# Patient Record
Sex: FEMALE | Race: WHITE | NOT HISPANIC OR LATINO | Employment: OTHER | RURAL
[De-identification: names, ages, dates, MRNs, and addresses within clinical notes are randomized per-mention and may not be internally consistent; named-entity substitution may affect disease eponyms.]

---

## 2017-05-12 ENCOUNTER — OFFICE VISIT (OUTPATIENT)
Dept: FAMILY MEDICINE CLINIC | Facility: CLINIC | Age: 62
End: 2017-05-12

## 2017-05-12 VITALS
OXYGEN SATURATION: 98 % | HEIGHT: 68 IN | RESPIRATION RATE: 16 BRPM | HEART RATE: 64 BPM | SYSTOLIC BLOOD PRESSURE: 122 MMHG | DIASTOLIC BLOOD PRESSURE: 76 MMHG | BODY MASS INDEX: 27.13 KG/M2 | WEIGHT: 179 LBS

## 2017-05-12 DIAGNOSIS — I10 ESSENTIAL HYPERTENSION: Primary | ICD-10-CM

## 2017-05-12 DIAGNOSIS — G89.29 CHRONIC MIDLINE LOW BACK PAIN WITHOUT SCIATICA: ICD-10-CM

## 2017-05-12 DIAGNOSIS — M54.50 CHRONIC MIDLINE LOW BACK PAIN WITHOUT SCIATICA: ICD-10-CM

## 2017-05-12 PROCEDURE — 99213 OFFICE O/P EST LOW 20 MIN: CPT | Performed by: FAMILY MEDICINE

## 2017-05-12 RX ORDER — ASPIRIN 81 MG/1
TABLET ORAL
COMMUNITY
End: 2019-02-21

## 2017-05-12 RX ORDER — MELOXICAM 15 MG/1
15 TABLET ORAL DAILY
Qty: 30 TABLET | Refills: 1 | Status: SHIPPED | OUTPATIENT
Start: 2017-05-12 | End: 2017-07-06

## 2017-05-12 RX ORDER — LISINOPRIL AND HYDROCHLOROTHIAZIDE 12.5; 1 MG/1; MG/1
1 TABLET ORAL DAILY
Qty: 90 TABLET | Refills: 4 | Status: SHIPPED | OUTPATIENT
Start: 2017-05-12 | End: 2017-12-04 | Stop reason: SDUPTHER

## 2017-05-15 ENCOUNTER — HOSPITAL ENCOUNTER (OUTPATIENT)
Dept: GENERAL RADIOLOGY | Facility: HOSPITAL | Age: 62
Discharge: HOME | End: 2017-05-15
Attending: FAMILY MEDICINE | Admitting: FAMILY MEDICINE

## 2017-05-15 PROCEDURE — 73523 X-RAY EXAM HIPS BI 5/> VIEWS: CPT

## 2017-05-15 PROCEDURE — 72110 X-RAY EXAM L-2 SPINE 4/>VWS: CPT

## 2017-05-17 ENCOUNTER — HOSPITAL ENCOUNTER (OUTPATIENT)
Dept: WOMENS IMAGING | Age: 62
Discharge: HOME | End: 2017-05-17
Payer: COMMERCIAL

## 2017-05-17 DIAGNOSIS — Z12.31 VISIT FOR SCREENING MAMMOGRAM: ICD-10-CM

## 2017-05-17 PROCEDURE — 77063 BREAST TOMOSYNTHESIS BI: CPT

## 2017-05-24 ENCOUNTER — OFFICE VISIT (OUTPATIENT)
Dept: SURGERY | Age: 62
End: 2017-05-24
Payer: COMMERCIAL

## 2017-05-24 VITALS
BODY MASS INDEX: 27.07 KG/M2 | WEIGHT: 178.6 LBS | DIASTOLIC BLOOD PRESSURE: 72 MMHG | HEART RATE: 74 BPM | SYSTOLIC BLOOD PRESSURE: 118 MMHG | HEIGHT: 68 IN | RESPIRATION RATE: 16 BRPM

## 2017-05-24 DIAGNOSIS — N60.19 FIBROCYSTIC DISEASE OF BREAST, UNSPECIFIED LATERALITY: ICD-10-CM

## 2017-05-24 DIAGNOSIS — R92.2 DENSE BREASTS: ICD-10-CM

## 2017-05-24 DIAGNOSIS — Z12.31 VISIT FOR SCREENING MAMMOGRAM: Primary | ICD-10-CM

## 2017-05-24 PROCEDURE — 99213 OFFICE O/P EST LOW 20 MIN: CPT | Performed by: SURGERY

## 2017-05-24 RX ORDER — MELOXICAM 15 MG/1
15 TABLET ORAL
COMMUNITY
Start: 2017-05-12 | End: 2018-05-18 | Stop reason: ALTCHOICE

## 2017-05-25 PROBLEM — R92.30 DENSE BREASTS: Status: ACTIVE | Noted: 2017-05-25

## 2017-05-25 PROBLEM — R92.2 DENSE BREASTS: Status: ACTIVE | Noted: 2017-05-25

## 2017-06-01 ENCOUNTER — HOSPITAL ENCOUNTER (OUTPATIENT)
Dept: PHYSICAL THERAPY | Facility: HOSPITAL | Age: 62
Setting detail: THERAPIES SERIES
Discharge: HOME | End: 2017-06-01
Attending: FAMILY MEDICINE

## 2017-06-01 DIAGNOSIS — M54.50 CHRONIC MIDLINE LOW BACK PAIN WITHOUT SCIATICA: Primary | ICD-10-CM

## 2017-06-01 DIAGNOSIS — G89.29 CHRONIC MIDLINE LOW BACK PAIN WITHOUT SCIATICA: Primary | ICD-10-CM

## 2017-06-01 PROCEDURE — 97162 PT EVAL MOD COMPLEX 30 MIN: CPT | Performed by: PHYSICAL THERAPIST

## 2017-06-01 NOTE — THERAPY EVALUATION
"    Outpatient Physical Therapy Ortho Initial Evaluation  [facility]     Patient Name: Karen Tipton  : 1955  MRN: [id number]  Today's Date: 2017      Visit Date: 2017    Patient Active Problem List   Diagnosis   • Essential hypertension   • Low back pain without sciatica        Past Medical History:   Diagnosis Date   • Hypertension         History reviewed. No pertinent surgical history.    Visit Dx:     ICD-10-CM ICD-9-CM   1. Chronic midline low back pain without sciatica M54.5 724.2    G89.29 338.29             Patient History       17 0745          History    Chief Complaint Pain  -TB      Type of Pain Back pain  -TB      Date Current Problem(s) Began 17  -TB      Brief Description of Current Complaint She started having back pain in January. She would feel a sharp pain in her back that would be sharp and stop her movement. She has a h/o scoliosis. She denies any radicular symptoms or B/B changes.  -TB      Patient/Caregiver Goals Relieve pain;Return to prior level of function  -TB      Patient's Rating of General Health Very good  -TB      Hand Dominance right-handed  -TB      Occupation/sports/leisure activities Surgery Scheduler at   -      Patient seeing anyone else for problem(s)? No  -TB      How has patient tried to help current problem? yoga, walking  -TB      What clinical tests have you had for this problem? X-ray  -TB      Results of Clinical Tests dextroscoliosis in mid lumbar; mild deg changes at T12/L1, L5/S1  -TB      Pain     Pain Location Back  -TB      Pain at Present 0  -TB      Pain at Best 0  -TB      Pain at Worst 4  -TB      Pain Frequency Intermittent   several times a day  -TB      What Performance Factors Make the Current Problem(s) WORSE? getting out of a car, stair climbing,   -TB      What Performance Factors Make the Current Problem(s) BETTER? slowly moving   -TB      Pain Comments she says her left leg feel \"shorter\" when it goes out.  -TB      " "Fall Risk Assessment    Any falls in the past year: No  -TB      Does patient have a fear of falling No  -TB      Services    Prior Rehab/Home Health Experiences No  -TB      Do you plan to receive Home Health services in the near future No  -TB      Daily Activities    Primary Language English  -TB      Are you able to read Yes  -TB      Are you able to write Yes  -TB      How does patient learn best? Listening;Reading;Demonstration  -TB      Teaching needs identified Home Exercise Program;Management of Condition  -TB      Patient is concerned about/has problems with Performing home management (household chores, shopping, care of dependents);Performing job responsibilities/community activities (work, school,  -TB      Does patient have problems with the following? None  -TB      Pt Participated in POC and Goals Yes  -TB      Safety    Are you being hurt, hit, or frightened by anyone at home or in your life? No  -TB      Are you being neglected by a caregiver No  -TB        User Key  (r) = Recorded By, (t) = Taken By, (c) = Cosigned By    Initials Name Provider Type    TB Thom Tidwell, PT Physical Therapist                PT Ortho       06/01/17 0900    Lumbosacral Accessory Motions    Lumbosacral Accessory Motions Tested? Yes  -TB    PA [place]- L1 Center:;Hypomobile   mid to lower thor hypomobile kyphosis  -TB    PA [place]- L2 Center:;Hypomobile  -TB    PA [place]- L3 Center:;Hypomobile  -TB    PA [place]- L4 Center:;Hypomobile  -TB    PA [place]- L5 Center:;Hypermobile  -TB    PA [place]- Sacral base Center:;Hypomobile  -TB      06/01/17 0800    Posture/Observations    Posture/Observations Comments right SI and iliac crest about 1/2\" higher than left; symmetrical in sitting. Right leg measured 0.5 cm longer than left from top of troch to bottom of medial mal.  -TB    Sensation    Sensation WNL? WNL  -TB    Special Tests/Palpation    Special Tests/Palpation Lumbar/SI  -TB    Lumbar/SI Special Tests    Standing Flexion " Test (SI Dysfunction) Left:;Positive  -TB    Stork Test (SI Dysfunction) Left:;Positive  -TB    Trendelenburg Test (Gluteus Medius Weakness) Left:;Positive  -TB    Seated Flexion Test (SI Dysfunction) Left:;Positive  -TB    Marixa Arnie Test (HNP) Bilateral:;Negative  -TB    Long Sit Test (Pelvic Malalignment) Left:;Positive  -TB    SLR (Neural Tension) Bilateral:;Negative  -TB    SI Compression Test (SI Dysfunction) Left:;Negative  -TB    SI Distraction Test (SI Dysfunction) Left:;Negative  -TB    Thigh Thrust/Posterior Shear (SI Dysfunction) Bilateral:;Negative  -TB    RENETTA (hip vs. SI Dysfunction) Bilateral:;Negative  -TB    Sacral Spring Test (SI Dysfunction) Bilateral:;Negative  -TB    ROM (Range of Motion)    General ROM Detail nory hips WFL in all planes, right hamstring SLR 70 deg, left 90 deg; nfmnfy50% flexion, 100% extension; c/o slight pain on first rep in both directions, then no symptoms  -TB    MMT (Manual Muscle Testing)    General MMT Assessment Detail left hip abd 4/5; ext 4+/5; rest of nory LE WNL  -TB    Pathomechanics    Spine Pathomechanics Hinges into extension at one segment in lumbar;Limited lumbar flattening with forward bend;Excessive thoracic kyphosis with forward bend  -TB      User Key  (r) = Recorded By, (t) = Taken By, (c) = Cosigned By    Initials Name Provider Type    TB Thom Tidwell PT Physical Therapist                            Therapy Education       06/01/17 0745          Therapy Education    Given HEP;Symptoms/condition management   SI belt, left SLS  -TB      Program New  -TB      How Provided Verbal;Demonstration  -TB      Provided to Patient  -TB      Level of Understanding Verbalized;Demonstrated  -TB        User Key  (r) = Recorded By, (t) = Taken By, (c) = Cosigned By    Initials Name Provider Type    TB Thom Tidwell PT Physical Therapist                PT OP Goals       06/01/17 0745       Long Term Goals    LTG Date to Achieve 06/22/17  -TB     LTG 1 Able to walk,  "stair climb, and transfer in/out of car without exacerbation of pain  -TB     LTG 1 Progress New  -TB     LTG 2 left SLS with good stability x 15 secs  -TB     LTG 2 Progress New  -TB     LTG 3 Independent with HEP for stability and lumbar/thoracic flexibility  -TB     LTG 3 Progress New  -TB     LTG 4 Symmetrical nory pelvis  -TB     LTG 4 Progress New  -TB     Time Calculation    PT Goal Re-Cert Due Date 07/01/17  -TB       User Key  (r) = Recorded By, (t) = Taken By, (c) = Cosigned By    Initials Name Provider Type    TB Thom Tidwell, PT Physical Therapist                PT Assessment/Plan       06/01/17 0745       PT Assessment    Functional Limitations Limitation in home management;Limitations in community activities;Limitations in functional capacity and performance;Performance in leisure activities  -TB     Impairments Pain;Posture;Range of motion;Joint mobility  -TB     Assessment Comments Her pain appears to be consistent with an unstable left SI that will get stuck. In standing, she showed asymmetry of her nory PSIS. Tranfers and stairclimbing would exacerbate her symptoms and the \"short left leg\" she described could be the left innominate rotating posteriorly. Her thoracolumbar spine is stiff and her left hip is weak which could exacerbate this. She could have issues with her L5/S1 segment where there is some degeneration. The treatment of improving flexibility and left hip stability would still be the same.  -TB     Please refer to paper survey for additional self-reported information Yes  -TB     Rehab Potential Excellent  -TB     Patient/caregiver participated in establishment of treatment plan and goals Yes  -TB     Patient would benefit from skilled therapy intervention Yes  -TB     PT Plan    PT Frequency 2x/week  -TB     Predicted Duration of Therapy Intervention (days/wks) 3 weeks  -TB     Planned CPT's? PT EVAL MOD COMPLELITY: 37929;PT THER PROC EA 15 MIN: 63638;PT MANUAL THERAPY EA 15 MIN: " 72918;PT GAIT TRAINING EA 15 MIN: 06349;PT ELECTRICAL STIM UNATTEND: ;PT ELECTRICAL STIM ATTD EA 15 MIN: 50005;PT ULTRASOUND EA 15 MIN: 59615  -TB     Physical Therapy Interventions (Optional Details) stretching;strengthening;postural re-education;patient/family education;manual therapy techniques;home exercise program;joint mobilization;lumbar stabilization  -TB     PT Plan Comments Today I fit her with an SI belt to see if this controls her flares of pain throughout the day. We will begin with mobilizations to her pelvis and lumbosacral and thoraci spine to improve mobility and progress her stability throughout her core. Her HEP will reflect this as well.   -TB       User Key  (r) = Recorded By, (t) = Taken By, (c) = Cosigned By    Initials Name Provider Type    TB Thom Tidwell, PT Physical Therapist                                    Time Calculation:   Start Time: 0745  Stop Time: 0845  Time Calculation (min): 60 min     Therapy Charges for Today     Code Description Service Date Service Provider Modifiers Qty    92270479804 HC PT EVAL MOD COMPLEXITY 4 6/1/2017 Thom Tidwell, PT GP 1                    Thom Tidwell, PT  6/1/2017

## 2017-06-09 ENCOUNTER — HOSPITAL ENCOUNTER (OUTPATIENT)
Dept: PHYSICAL THERAPY | Facility: HOSPITAL | Age: 62
Setting detail: THERAPIES SERIES
Discharge: HOME | End: 2017-06-09

## 2017-06-09 DIAGNOSIS — M54.50 CHRONIC MIDLINE LOW BACK PAIN WITHOUT SCIATICA: Primary | ICD-10-CM

## 2017-06-09 DIAGNOSIS — G89.29 CHRONIC MIDLINE LOW BACK PAIN WITHOUT SCIATICA: Primary | ICD-10-CM

## 2017-06-09 PROCEDURE — 97110 THERAPEUTIC EXERCISES: CPT | Performed by: PHYSICAL THERAPIST

## 2017-06-09 PROCEDURE — 97140 MANUAL THERAPY 1/> REGIONS: CPT | Performed by: PHYSICAL THERAPIST

## 2017-06-09 NOTE — THERAPY TREATMENT NOTE
Outpatient Physical Therapy Ortho Treatment Note  [facility]     Patient Name: Karen Tipton  : 1955  MRN: [id number]  Today's Date: 2017      Visit Date: 2017    Visit Dx:    ICD-10-CM ICD-9-CM   1. Chronic midline low back pain without sciatica M54.5 724.2    G89.29 338.29       Patient Active Problem List   Diagnosis   • Essential hypertension   • Low back pain without sciatica        Past Medical History:   Diagnosis Date   • Hypertension         No past surgical history on file.                          PT Assessment/Plan       17       PT Assessment    Assessment Comments The SI belt seems to be working well in protecting her back. Our emphasis now is on flexibility and core stability.  -TB     PT Plan    PT Plan Comments Continue with progress core stability.  -TB       User Key  (r) = Recorded By, (t) = Taken By, (c) = Cosigned By    Initials Name Provider Type    ZULEYKA Tidwell, PT Physical Therapist                    Exercises       17          Subjective Comments    Subjective Comments She says she doing much better over the last week. She's been wearing the SI belt and her pain at most is 1.5/10 and doesn't last long.   -TB      Subjective Pain    Able to rate subjective pain? yes  -TB      Pre-Treatment Pain Level 0  -TB      Exercise 1    Exercise Name 1 PPT with TA isometric  -TB      Time (Minutes) 1 3  -TB      Exercise 2    Exercise Name 2 marching bridges--bent knee  -TB      Sets 2 2  -TB      Reps 2 10  -TB      Exercise 3    Exercise Name 3 hip abd wipers nory  -TB      Sets 3 3  -TB      Reps 3 10  -TB      Exercise 4    Exercise Name 4 prone plank off knee  -TB      Reps 4 3  -TB      Time (Seconds) 4 15  -TB        User Key  (r) = Recorded By, (t) = Taken By, (c) = Cosigned By    Initials Name Provider Type    ZULEYKA Tidwell, PT Physical Therapist                        Manual Rx (last 36 hours)      Manual Treatments       17           Manual Rx 1    Manual Rx 1 Location prone thoracic ext  -TB      Manual Rx 1 Type foam roll and manual  -TB      Manual Rx 1 Grade 3  -TB      Manual Rx 1 Duration 10  -TB      Manual Rx 2    Manual Rx 2 Location LS manual distraction  -TB      Manual Rx 2 Grade 3 sustained  -TB      Manual Rx 2 Duration 5  -TB      Manual Rx 3    Manual Rx 3 Location sacral ELIANA PA  -TB      Manual Rx 3 Grade 3 repetitive mostly to left LEIANA-no  caviitation  -TB      Manual Rx 3 Duration 5  -TB      Manual Rx 4    Manual Rx 4 Location passive left innominate anterior mob  -TB      Manual Rx 4 Type prone left hip in ext with OP at PSIS  -TB      Manual Rx 4 Duration 5  -TB        User Key  (r) = Recorded By, (t) = Taken By, (c) = Cosigned By    Initials Name Provider Type    ZULEYKA Tidwell, PT Physical Therapist                PT OP Goals       06/09/17 0730       Long Term Goals    LTG Date to Achieve 06/22/17  -TB     LTG 1 Able to walk, stair climb, and transfer in/out of car without exacerbation of pain  -TB     LTG 1 Progress Ongoing  -TB     LTG 1 Progress Comments doing well with SI belt with twinges no more than 1.5/10  -TB     LTG 2 left SLS with good stability x 15 secs  -TB     LTG 2 Progress New  -TB     LTG 3 Independent with HEP for stability and lumbar/thoracic flexibility  -TB     LTG 3 Progress Ongoing  -TB     LTG 3 Progress Comments progressed core stability TA and glute sets  -TB     LTG 4 Symmetrical nory pelvis  -TB     LTG 4 Progress New  -TB     Time Calculation    PT Goal Re-Cert Due Date 07/01/17  -TB       User Key  (r) = Recorded By, (t) = Taken By, (c) = Cosigned By    Initials Name Provider Type    ZULEYKA Tidwell, PT Physical Therapist                Therapy Education       06/09/17 0730          Therapy Education    Given HEP;Symptoms/condition management   SI belt, left SLS  -TB      Program Progressed   added TA and glute sets  -TB      How Provided Verbal;Demonstration  -TB      Provided  to Patient  -TB      Level of Understanding Verbalized;Demonstrated  -TB        User Key  (r) = Recorded By, (t) = Taken By, (c) = Cosigned By    Initials Name Provider Type    TB Thom Tidwell, PT Physical Therapist                Time Calculation:   Start Time: 0730  Stop Time: 0815  Time Calculation (min): 45 min  Total Timed Code Minutes- PT: 45 minute(s)    Therapy Charges for Today     Code Description Service Date Service Provider Modifiers Qty    52846998518  PT MANUAL THERAPY EA 15 MIN 6/9/2017 Thom Tidwell, PT GP 2    66215359633 HC PT THER PROC EA 15 MIN 6/9/2017 Thom Tidwell, PT GP 1                    Thom Tidwell, PT  6/9/2017

## 2017-06-12 ENCOUNTER — HOSPITAL ENCOUNTER (OUTPATIENT)
Dept: PHYSICAL THERAPY | Facility: HOSPITAL | Age: 62
Setting detail: THERAPIES SERIES
Discharge: HOME | End: 2017-06-12

## 2017-06-12 DIAGNOSIS — G89.29 CHRONIC MIDLINE LOW BACK PAIN WITHOUT SCIATICA: Primary | ICD-10-CM

## 2017-06-12 DIAGNOSIS — M54.50 CHRONIC MIDLINE LOW BACK PAIN WITHOUT SCIATICA: Primary | ICD-10-CM

## 2017-06-12 PROCEDURE — 97110 THERAPEUTIC EXERCISES: CPT | Performed by: PHYSICAL THERAPIST

## 2017-06-12 PROCEDURE — 97140 MANUAL THERAPY 1/> REGIONS: CPT | Performed by: PHYSICAL THERAPIST

## 2017-06-12 NOTE — THERAPY TREATMENT NOTE
Outpatient Physical Therapy Ortho Treatment Note  [facility]     Patient Name: Karen Tipton  : 1955  MRN: [id number]  Today's Date: 2017      Visit Date: 2017    Visit Dx:    ICD-10-CM ICD-9-CM   1. Chronic midline low back pain without sciatica M54.5 724.2    G89.29 338.29       Patient Active Problem List   Diagnosis   • Essential hypertension   • Low back pain without sciatica        Past Medical History:   Diagnosis Date   • Hypertension         No past surgical history on file.                          PT Assessment/Plan       17 0720       PT Assessment    Assessment Comments The belt is doing a really good job of stabilizing her pelvis and controlling her pain. We may be done with PT sooner than expecting.  -TB     PT Plan    PT Plan Comments Continue progression of her HEP.   -TB       User Key  (r) = Recorded By, (t) = Taken By, (c) = Cosigned By    Initials Name Provider Type    TB Thom Tidwell, PT Physical Therapist                    Exercises       17 07          Subjective Comments    Subjective Comments She hasn't had any hip pain for almost 2 weeks. She is using  the SI belt and feels like it makes a big difference. She is off of her anti-inflammatories and still with no pain.  -TB      Subjective Pain    Pre-Treatment Pain Level 0  -TB      Exercise 1    Exercise Name 1 mulekick nory  -TB      Sets 1 1  -TB      Reps 1 10  -TB      Exercise 2    Exercise Name 2 cat/cow  -TB      Time (Minutes) 2 1  -TB      Exercise 3    Exercise Name 3 plank off toes  -TB      Reps 3 5  -TB      Time (Seconds) 3 10-15  -TB      Exercise 4    Exercise Name 4 lower crunches  -TB      Reps 4 10  -TB      Exercise 5    Exercise Name 5 wall//ball hip abd with yellow ball nory  -TB      Equipment 5 --   yellow swiss ball  -TB      Sets 5 2  -TB      Reps 5 10  -TB      Exercise 6    Exercise Name 6 wall/ball squats  -TB      Reps 6 10  -TB        User Key  (r) = Recorded By, (t) =  Taken By, (c) = Cosigned By    Initials Name Provider Type    TB Thom Tidwell PT Physical Therapist                        Manual Rx (last 36 hours)      Manual Treatments       06/12/17 0720          Manual Rx 1    Manual Rx 1 Location prone thoracic ex  -TB      Manual Rx 1 Type manual and foam  -TB      Manual Rx 1 Grade 3  -TB      Manual Rx 1 Duration 10  -TB      Manual Rx 2    Manual Rx 2 Location LS segment  -TB      Manual Rx 2 Type manual distraction  -TB      Manual Rx 2 Grade 3 sustained  -TB      Manual Rx 2 Duration 5  -TB      Manual Rx 3    Manual Rx 3 Location sacral ELIANA nory  -TB      Manual Rx 3 Type PA  -TB      Manual Rx 3 Grade 3 repetitive  -TB      Manual Rx 3 Duration 5  -TB        User Key  (r) = Recorded By, (t) = Taken By, (c) = Cosigned By    Initials Name Provider Type    ZULEYKA Tidwell PT Physical Therapist                PT OP Goals       06/12/17 0720       Long Term Goals    LTG Date to Achieve 06/22/17  -TB     LTG 1 Able to walk, stair climb, and transfer in/out of car without exacerbation of pain  -TB     LTG 1 Progress Ongoing  -TB     LTG 2 left SLS with good stability x 15 secs  -TB     LTG 2 Progress Ongoing  -TB     LTG 2 Progress Comments able to stand on left x 5-7 sec  -TB     LTG 3 Independent with HEP for stability and lumbar/thoracic flexibility  -TB     LTG 3 Progress Ongoing  -TB     LTG 3 Progress Comments been working on thoracic mobility with mobilizations  -TB     LTG 4 Symmetrical nory pelvis  -TB     LTG 4 Progress Ongoing  -TB     Time Calculation    PT Goal Re-Cert Due Date 07/01/17  -TB       User Key  (r) = Recorded By, (t) = Taken By, (c) = Cosigned By    Initials Name Provider Type    ZULEYKA Tidwell PT Physical Therapist                Therapy Education       06/12/17 0720          Therapy Education    Given HEP;Symptoms/condition management   SI belt, left SLS  -TB      Program Progressed   added mulekick  -TB      How Provided  Verbal;Demonstration  -TB      Provided to Patient  -TB      Level of Understanding Verbalized;Demonstrated  -TB        User Key  (r) = Recorded By, (t) = Taken By, (c) = Cosigned By    Initials Name Provider Type    TB Thom Tidwell, PT Physical Therapist                Time Calculation:   Start Time: 0720  Stop Time: 0800  Time Calculation (min): 40 min  Total Timed Code Minutes- PT: 40 minute(s)    Therapy Charges for Today     Code Description Service Date Service Provider Modifiers Qty    50772085563  PT MANUAL THERAPY EA 15 MIN 6/12/2017 Thom Tidwell, PT GP 1    17593427431 HC PT THER PROC EA 15 MIN 6/12/2017 Thom Tidwell, PT GP 2                    Thom Tidwell, PT  6/12/2017

## 2017-06-16 ENCOUNTER — HOSPITAL ENCOUNTER (OUTPATIENT)
Dept: PHYSICAL THERAPY | Facility: HOSPITAL | Age: 62
Setting detail: THERAPIES SERIES
Discharge: HOME | End: 2017-06-16

## 2017-06-16 DIAGNOSIS — M54.50 CHRONIC MIDLINE LOW BACK PAIN WITHOUT SCIATICA: Primary | ICD-10-CM

## 2017-06-16 DIAGNOSIS — G89.29 CHRONIC MIDLINE LOW BACK PAIN WITHOUT SCIATICA: Primary | ICD-10-CM

## 2017-06-16 PROCEDURE — 97110 THERAPEUTIC EXERCISES: CPT | Performed by: PHYSICAL THERAPIST

## 2017-06-16 PROCEDURE — 97140 MANUAL THERAPY 1/> REGIONS: CPT | Performed by: PHYSICAL THERAPIST

## 2017-06-16 NOTE — THERAPY TREATMENT NOTE
Outpatient Physical Therapy Ortho Treatment Note  [facility]     Patient Name: Karen Tipton  : 1955  MRN: [id number]  Today's Date: 2017      Visit Date: 2017    Visit Dx:    ICD-10-CM ICD-9-CM   1. Chronic midline low back pain without sciatica M54.5 724.2    G89.29 338.29       Patient Active Problem List   Diagnosis   • Essential hypertension   • Low back pain without sciatica        Past Medical History:   Diagnosis Date   • Hypertension         No past surgical history on file.                          PT Assessment/Plan       17 0800       PT Assessment    Assessment Comments She is progressing very well. She has 2 visits left and I believe will be ready for d/c then.  -TB     PT Plan    PT Plan Comments Continue with hip and core stability.   -TB       User Key  (r) = Recorded By, (t) = Taken By, (c) = Cosigned By    Initials Name Provider Type    TB Thom Tidwell, PT Physical Therapist                    Exercises       17 0715          Subjective Comments    Subjective Comments She had a flare yesterday  -TB      Subjective Pain    Pre-Treatment Pain Level 0  -TB      Exercise 1    Exercise Name 1 mulekick nory  -TB      Sets 1 1  -TB      Reps 1 10  -TB      Exercise 2    Exercise Name 2 wall/ball squats with tband around hip for ER  -TB      Sets 2 2  -TB      Reps 2 15  -TB      Exercise 3    Exercise Name 3 wall/ball planks rolling ball up/down  -TB      Sets 3 2  -TB      Reps 3 10  -TB      Exercise 4    Exercise Name 4 side steps in squat against tband  -TB      Time (Minutes) 4 40' nory  -TB      Exercise 5    Exercise Name 5 instructed on hip airplanes w/o rotation  -TB        User Key  (r) = Recorded By, (t) = Taken By, (c) = Cosigned By    Initials Name Provider Type    TB Thom Tidwell, PT Physical Therapist                        Manual Rx (last 36 hours)      Manual Treatments       17 0715          Manual Rx 1    Manual Rx 1 Location prone thoracic  ex  -TB      Manual Rx 1 Type manual and foam  -TB      Manual Rx 1 Grade 3  -TB      Manual Rx 1 Duration 10  -TB      Manual Rx 2    Manual Rx 2 Location LS segment  -TB      Manual Rx 2 Type manual distraction  -TB      Manual Rx 2 Grade 3 sustained  -TB      Manual Rx 2 Duration 10  -TB      Manual Rx 3    Manual Rx 3 Location sacral ELIANA nory  -TB      Manual Rx 3 Type PA  -TB      Manual Rx 3 Grade 3 repetitive  -TB      Manual Rx 3 Duration 5  -TB        User Key  (r) = Recorded By, (t) = Taken By, (c) = Cosigned By    Initials Name Provider Type    ZULEYKA Tidwell, PT Physical Therapist                PT OP Goals       06/16/17 0715       Long Term Goals    LTG Date to Achieve 06/22/17  -TB     LTG 1 Able to walk, stair climb, and transfer in/out of car without exacerbation of pain  -TB     LTG 1 Progress Ongoing  -TB     LTG 2 left SLS with good stability x 15 secs  -TB     LTG 2 Progress Ongoing  -TB     LTG 3 Independent with HEP for stability and lumbar/thoracic flexibility  -TB     LTG 3 Progress Ongoing  -TB     LTG 4 Symmetrical nory pelvis  -TB     LTG 4 Progress Met  -TB     LTG 4 Progress Comments no issues  -TB     Time Calculation    PT Goal Re-Cert Due Date 07/01/17  -TB       User Key  (r) = Recorded By, (t) = Taken By, (c) = Cosigned By    Initials Name Provider Type    ZULEYKA Tidwell PT Physical Therapist                Therapy Education       06/16/17 0800          Therapy Education    Given HEP;Symptoms/condition management   SI belt, left SLS  -TB      Program Progressed   added hip airplanes without rotation  -TB      How Provided Verbal;Demonstration  -TB      Provided to Patient  -TB      Level of Understanding Verbalized;Demonstrated  -TB        User Key  (r) = Recorded By, (t) = Taken By, (c) = Cosigned By    Initials Name Provider Type    ZULEYKA Tidwell PT Physical Therapist                Time Calculation:   Start Time: 0715  Stop Time: 0800  Time Calculation (min): 45  min  Total Timed Code Minutes- PT: 45 minute(s)    Therapy Charges for Today     Code Description Service Date Service Provider Modifiers Qty    40641423380 HC PT MANUAL THERAPY EA 15 MIN 6/16/2017 Thom Tidwell, PT GP 1    09359215064 HC PT THER PROC EA 15 MIN 6/16/2017 Thom Tidwell, PT GP 2                    Thom Tidwell, PT  6/16/2017

## 2017-06-19 ENCOUNTER — HOSPITAL ENCOUNTER (OUTPATIENT)
Dept: PHYSICAL THERAPY | Facility: HOSPITAL | Age: 62
Setting detail: THERAPIES SERIES
Discharge: HOME | End: 2017-06-19

## 2017-06-19 DIAGNOSIS — M54.50 CHRONIC MIDLINE LOW BACK PAIN WITHOUT SCIATICA: Primary | ICD-10-CM

## 2017-06-19 DIAGNOSIS — G89.29 CHRONIC MIDLINE LOW BACK PAIN WITHOUT SCIATICA: Primary | ICD-10-CM

## 2017-06-19 PROCEDURE — 97140 MANUAL THERAPY 1/> REGIONS: CPT | Performed by: PHYSICAL THERAPIST

## 2017-06-19 PROCEDURE — 97110 THERAPEUTIC EXERCISES: CPT | Performed by: PHYSICAL THERAPIST
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
## 2017-06-23 ENCOUNTER — HOSPITAL ENCOUNTER (OUTPATIENT)
Dept: PHYSICAL THERAPY | Facility: HOSPITAL | Age: 62
Setting detail: THERAPIES SERIES
Discharge: HOME | End: 2017-06-23

## 2017-06-23 DIAGNOSIS — G89.29 CHRONIC MIDLINE LOW BACK PAIN WITHOUT SCIATICA: Primary | ICD-10-CM

## 2017-06-23 DIAGNOSIS — M54.50 CHRONIC MIDLINE LOW BACK PAIN WITHOUT SCIATICA: Primary | ICD-10-CM

## 2017-06-23 PROCEDURE — 97110 THERAPEUTIC EXERCISES: CPT | Performed by: PHYSICAL THERAPIST

## 2017-06-23 NOTE — THERAPY DISCHARGE NOTE
Outpatient Physical Therapy Ortho Treatment Note/Discharge Summary   [facility]     Patient Name: Karen Tipton  : 1955  MRN: [id number]  Today's Date: 2017      Visit Date: 2017    Visit Dx:    ICD-10-CM ICD-9-CM   1. Chronic midline low back pain without sciatica M54.5 724.2    G89.29 338.29       Patient Active Problem List   Diagnosis   • Essential hypertension   • Low back pain without sciatica        Past Medical History:   Diagnosis Date   • Hypertension         No past surgical history on file.                          PT Assessment/Plan       17       PT Assessment    Assessment Comments She is doing very well and hasn't had any problems in a couple of weeks and has met all her goals. She is ready for d/c today.  -TB     PT Plan    PT Plan Comments DC from PT to HEP.  -TB       User Key  (r) = Recorded By, (t) = Taken By, (c) = Cosigned By    Initials Name Provider Type    TB Thom Tidwell, PT Physical Therapist                    Exercises       17          Subjective Comments    Subjective Comments She hasn't had pain in 2 weeks. She feels like she is doing well and is OK with d/c today.  -TB      Subjective Pain    Pre-Treatment Pain Level 0  -TB      Exercise 1    Exercise Name 1 supine on foam roll shoulder clocks  -TB      Cueing 1 Verbal  -TB      Equipment 1 Theraband  -TB      Sets 1 1  -TB      Reps 1 10  -TB      Time (Minutes) 1 3  -TB      Exercise 2    Exercise Name 2 wall/ball hip wiper  -TB      Sets 2 2  -TB      Reps 2 --  -TB      Time (Minutes) 2 1  -TB      Exercise 3    Exercise Name 3 prone swiss ball hip extension   -TB      Sets 3 --  -TB      Reps 3 3  -TB      Time (Minutes) 3 --  -TB      Time (Seconds) 3 sustained 15 sec  -TB      Exercise 4    Exercise Name 4 prone plank to distal thigh on swiss ball  -TB      Cueing 4 Verbal;Demo   slow and control  -TB      Sets 4 2  -TB      Reps 4 --  -TB      Time (Minutes) 4 --  -TB      Time  (Seconds) 4 15  -TB      Exercise 5    Exercise Name 5 seated on swiss ball marching  -TB      Equipment 5 --  -TB      Sets 5 --  -TB      Reps 5 --  -TB      Time (Minutes) 5 2  -TB      Exercise 6    Exercise Name 6 bridge seated on swiss ball  -TB      Reps 6 10  -TB      Exercise 7    Exercise Name 7 SLS nory to assess goals; moved to hip airplane  -TB      Time (Minutes) 7 5  -TB        User Key  (r) = Recorded By, (t) = Taken By, (c) = Cosigned By    Initials Name Provider Type    ZULEYKA Tidwell PT Physical Therapist                               PT OP Goals       06/23/17 0715       Long Term Goals    LTG Date to Achieve 06/22/17  -TB     LTG 1 Able to walk, stair climb, and transfer in/out of car without exacerbation of pain  -TB     LTG 1 Progress Met  -TB     LTG 1 Progress Comments no pain for 2 weeks  -TB     LTG 2 left SLS with good stability x 15 secs  -TB     LTG 2 Progress Met  -TB     LTG 2 Progress Comments 15+ secs  -TB     LTG 3 Independent with HEP for stability and lumbar/thoracic flexibility  -TB     LTG 3 Progress Met  -TB     LTG 3 Progress Comments she is working on hip airplanes; recommended yoga/pilates videos  -TB     LTG 4 Symmetrical nory pelvis  -TB     LTG 4 Progress Met  -TB     LTG 4 Progress Comments no issues  -TB       User Key  (r) = Recorded By, (t) = Taken By, (c) = Cosigned By    Initials Name Provider Type    ZULEYKA Tidwell PT Physical Therapist                Therapy Education       06/23/17 0757          Therapy Education    Given HEP;Symptoms/condition management  -TB      Program Reinforced  -TB      How Provided Verbal;Demonstration  -TB      Provided to Patient  -TB      Level of Understanding Verbalized;Demonstrated  -TB        User Key  (r) = Recorded By, (t) = Taken By, (c) = Cosigned By    Initials Name Provider Type    ZULEYKA Tidwell PT Physical Therapist                Time Calculation:   Start Time: 0715  Stop Time: 0800  Time Calculation (min):  45 min  Total Timed Code Minutes- PT: 45 minute(s)    Therapy Charges for Today     Code Description Service Date Service Provider Modifiers Qty    22354251098 HC PT THER PROC EA 15 MIN 6/23/2017 Thom Tidwell, PT GP 3                OP PT Discharge Summary  Date of Discharge: 06/23/17  Reason for Discharge: All goals achieved  Outcomes Achieved: Able to achieve all goals within established timeline  Discharge Destination: Home with home program  Discharge Instructions: Our focus was on mobilizations early. I fit her with an SI belt on the first visit which made a big difference. At the end, she hadn't had pain for 2 weeks and was confident in her DC instructions.      Thom Tidwell, PT  6/23/2017

## 2017-07-06 ENCOUNTER — OFFICE VISIT (OUTPATIENT)
Dept: FAMILY MEDICINE CLINIC | Facility: CLINIC | Age: 62
End: 2017-07-06

## 2017-07-06 VITALS
DIASTOLIC BLOOD PRESSURE: 74 MMHG | SYSTOLIC BLOOD PRESSURE: 114 MMHG | BODY MASS INDEX: 26.98 KG/M2 | OXYGEN SATURATION: 98 % | WEIGHT: 178 LBS | RESPIRATION RATE: 16 BRPM | HEART RATE: 64 BPM | HEIGHT: 68 IN

## 2017-07-06 DIAGNOSIS — I10 ESSENTIAL HYPERTENSION: Primary | ICD-10-CM

## 2017-07-06 DIAGNOSIS — M54.50 ACUTE MIDLINE LOW BACK PAIN WITHOUT SCIATICA: ICD-10-CM

## 2017-07-06 PROCEDURE — 99213 OFFICE O/P EST LOW 20 MIN: CPT | Performed by: FAMILY MEDICINE

## 2017-07-06 NOTE — PROGRESS NOTES
"Dario Tipton is a 62 y.o. female.     Chief Complaint   Patient presents with   • Follow-up     PT        History of Present Illness     she thinks the physical therapy has helped her back pain--she has not had to use any analgesics or alleve ..her bp has been stable..she is pleased with how she feels --she is performing exercises at home      Current Outpatient Prescriptions:   •  aspirin 81 MG EC tablet, Take  by mouth., Disp: , Rfl:   •  lisinopril-hydrochlorothiazide (PRINZIDE,ZESTORETIC) 10-12.5 MG per tablet, Take 1 tablet by mouth Daily., Disp: 90 tablet, Rfl: 4  •  therapeutic multivitamin-minerals (THERAGRAN-M) tablet, Take 1 tablet by mouth daily.  , Disp: , Rfl:   No Known Allergies    Past Medical History:   Diagnosis Date   • Hypertension      No past surgical history on file.    Review of Systems   Constitutional: Negative.    HENT: Negative.    Eyes: Negative.    Respiratory: Negative.    Cardiovascular: Negative.    Gastrointestinal: Negative.    Endocrine: Negative.    Genitourinary: Negative.    Musculoskeletal: Positive for back pain (improved).   Skin: Negative.    Allergic/Immunologic: Negative.    Neurological: Negative.    Hematological: Negative.    Psychiatric/Behavioral: Negative.        Objective  /74  Pulse 64  Resp 16  Ht 68\" (172.7 cm)  Wt 178 lb (80.7 kg)  SpO2 98%  BMI 27.06 kg/m2  Physical Exam   Constitutional: She is oriented to person, place, and time. She appears well-developed and well-nourished.   HENT:   Head: Normocephalic and atraumatic.   Right Ear: External ear normal.   Left Ear: External ear normal.   Nose: Nose normal.   Mouth/Throat: Oropharynx is clear and moist.   Eyes: Conjunctivae and EOM are normal. Pupils are equal, round, and reactive to light.   Neck: Normal range of motion. Neck supple.   Cardiovascular: Normal rate, regular rhythm, normal heart sounds and intact distal pulses.    Pulmonary/Chest: Effort normal and breath sounds " normal.   Abdominal: Soft. Bowel sounds are normal.   Musculoskeletal: Normal range of motion.   Neurological: She is alert and oriented to person, place, and time. She has normal reflexes.   Skin: Skin is warm and dry.   Psychiatric: She has a normal mood and affect. Her behavior is normal. Judgment and thought content normal.   Nursing note and vitals reviewed.      Assessment/Plan   Karen was seen today for follow-up.    Diagnoses and all orders for this visit:    Essential hypertension    Acute midline low back pain without sciatica          im glad she is better       No orders of the defined types were placed in this encounter.      Follow up: 6 month(s)

## 2017-12-04 RX ORDER — LISINOPRIL AND HYDROCHLOROTHIAZIDE 12.5; 1 MG/1; MG/1
1 TABLET ORAL DAILY
Qty: 90 TABLET | Refills: 0 | Status: SHIPPED | OUTPATIENT
Start: 2017-12-04 | End: 2020-02-21

## 2018-01-08 ENCOUNTER — OFFICE VISIT (OUTPATIENT)
Dept: FAMILY MEDICINE CLINIC | Facility: CLINIC | Age: 63
End: 2018-01-08

## 2018-01-08 VITALS
WEIGHT: 177 LBS | SYSTOLIC BLOOD PRESSURE: 118 MMHG | BODY MASS INDEX: 26.83 KG/M2 | DIASTOLIC BLOOD PRESSURE: 78 MMHG | OXYGEN SATURATION: 98 % | HEART RATE: 63 BPM | HEIGHT: 68 IN | RESPIRATION RATE: 16 BRPM

## 2018-01-08 DIAGNOSIS — I10 ESSENTIAL HYPERTENSION: Primary | ICD-10-CM

## 2018-01-08 PROCEDURE — 99213 OFFICE O/P EST LOW 20 MIN: CPT | Performed by: FAMILY MEDICINE

## 2018-01-08 NOTE — PROGRESS NOTES
"Dario Tipton is a 62 y.o. female.     Chief Complaint   Patient presents with   • Follow-up     6 mo       History of Present Illness     she notes good  bp control without cp or ha---      Current Outpatient Prescriptions:   •  aspirin 81 MG EC tablet, Take  by mouth., Disp: , Rfl:   •  lisinopril-hydrochlorothiazide (PRINZIDE,ZESTORETIC) 10-12.5 MG per tablet, Take 1 tablet by mouth Daily., Disp: 90 tablet, Rfl: 0  •  therapeutic multivitamin-minerals (THERAGRAN-M) tablet, Take 1 tablet by mouth daily.  , Disp: , Rfl:   No Known Allergies    Past Medical History:   Diagnosis Date   • Hypertension      No past surgical history on file.    Review of Systems   Constitutional: Negative.    HENT: Negative.    Eyes: Negative.    Respiratory: Negative.    Cardiovascular: Negative.    Gastrointestinal: Negative.    Endocrine: Negative.    Genitourinary: Negative.    Musculoskeletal: Negative.    Skin: Negative.    Allergic/Immunologic: Negative.    Neurological: Negative.    Hematological: Negative.    Psychiatric/Behavioral: Negative.        Objective  /78  Pulse 63  Resp 16  Ht 172.7 cm (68\")  Wt 80.3 kg (177 lb)  SpO2 98%  BMI 26.91 kg/m2  Physical Exam   Constitutional: She is oriented to person, place, and time. She appears well-developed and well-nourished.   HENT:   Head: Normocephalic and atraumatic.   Right Ear: External ear normal.   Left Ear: External ear normal.   Nose: Nose normal.   Mouth/Throat: Oropharynx is clear and moist.   Eyes: Conjunctivae and EOM are normal. Pupils are equal, round, and reactive to light.   Neck: Normal range of motion. Neck supple.   Cardiovascular: Normal rate, regular rhythm, normal heart sounds and intact distal pulses.    Pulmonary/Chest: Effort normal and breath sounds normal.   Abdominal: Soft. Bowel sounds are normal.   Musculoskeletal: Normal range of motion.   Neurological: She is alert and oriented to person, place, and time. She has normal " reflexes.   Skin: Skin is warm and dry.   Psychiatric: She has a normal mood and affect. Her behavior is normal. Judgment and thought content normal.   Nursing note and vitals reviewed.      Assessment/Plan   Karen was seen today for follow-up.    Diagnoses and all orders for this visit:    Essential hypertension      She says she is up to date on mammogram and colonoscopy           No orders of the defined types were placed in this encounter.      Follow up: 6 month(s)

## 2018-05-18 ENCOUNTER — HOSPITAL ENCOUNTER (OUTPATIENT)
Dept: WOMENS IMAGING | Age: 63
Discharge: HOME | End: 2018-05-18
Payer: COMMERCIAL

## 2018-05-18 ENCOUNTER — OFFICE VISIT (OUTPATIENT)
Dept: SURGERY | Age: 63
End: 2018-05-18
Payer: COMMERCIAL

## 2018-05-18 VITALS
HEART RATE: 80 BPM | DIASTOLIC BLOOD PRESSURE: 70 MMHG | WEIGHT: 183 LBS | SYSTOLIC BLOOD PRESSURE: 138 MMHG | HEIGHT: 68 IN | BODY MASS INDEX: 27.74 KG/M2

## 2018-05-18 DIAGNOSIS — R92.0 MICROCALCIFICATIONS OF THE BREAST: Primary | ICD-10-CM

## 2018-05-18 DIAGNOSIS — Z12.31 VISIT FOR SCREENING MAMMOGRAM: ICD-10-CM

## 2018-05-18 PROCEDURE — 99212 OFFICE O/P EST SF 10 MIN: CPT | Performed by: PHYSICIAN ASSISTANT

## 2018-05-18 PROCEDURE — 77067 SCR MAMMO BI INCL CAD: CPT

## 2018-05-21 ENCOUNTER — TELEPHONE (OUTPATIENT)
Dept: WOMENS IMAGING | Age: 63
End: 2018-05-21

## 2018-05-22 ENCOUNTER — HOSPITAL ENCOUNTER (OUTPATIENT)
Dept: WOMENS IMAGING | Age: 63
Discharge: HOME | End: 2018-05-22
Payer: COMMERCIAL

## 2018-05-22 DIAGNOSIS — R92.0 MICROCALCIFICATIONS OF THE BREAST: ICD-10-CM

## 2018-05-22 PROCEDURE — G0279 TOMOSYNTHESIS, MAMMO: HCPCS

## 2018-06-13 RX ORDER — LISINOPRIL AND HYDROCHLOROTHIAZIDE 12.5; 1 MG/1; MG/1
TABLET ORAL
Qty: 90 TABLET | Refills: 4 | Status: SHIPPED | OUTPATIENT
Start: 2018-06-13 | End: 2018-08-21 | Stop reason: SDUPTHER

## 2018-08-21 ENCOUNTER — OFFICE VISIT (OUTPATIENT)
Dept: FAMILY MEDICINE CLINIC | Facility: CLINIC | Age: 63
End: 2018-08-21

## 2018-08-21 VITALS
BODY MASS INDEX: 26.98 KG/M2 | TEMPERATURE: 98.8 F | RESPIRATION RATE: 16 BRPM | WEIGHT: 178 LBS | DIASTOLIC BLOOD PRESSURE: 74 MMHG | SYSTOLIC BLOOD PRESSURE: 136 MMHG | HEIGHT: 68 IN | HEART RATE: 78 BPM

## 2018-08-21 DIAGNOSIS — I10 ESSENTIAL HYPERTENSION: Primary | ICD-10-CM

## 2018-08-21 LAB
25(OH)D3+25(OH)D2 SERPL-MCNC: 34.6 NG/ML (ref 30–100)
ALBUMIN SERPL-MCNC: 4.1 G/DL (ref 3.5–5)
ALBUMIN/GLOB SERPL: 1.5 G/DL (ref 1.1–2.5)
ALP SERPL-CCNC: 60 U/L (ref 24–120)
ALT SERPL-CCNC: 31 U/L (ref 0–54)
AST SERPL-CCNC: 23 U/L (ref 7–45)
BASOPHILS # BLD AUTO: 0.06 10*3/MM3 (ref 0–0.2)
BASOPHILS NFR BLD AUTO: 0.9 % (ref 0–2)
BILIRUB SERPL-MCNC: 0.4 MG/DL (ref 0.1–1)
BUN SERPL-MCNC: 13 MG/DL (ref 5–21)
BUN/CREAT SERPL: 20 (ref 7–25)
CALCIUM SERPL-MCNC: 9.5 MG/DL (ref 8.4–10.4)
CHLORIDE SERPL-SCNC: 103 MMOL/L (ref 98–110)
CHOLEST SERPL-MCNC: 148 MG/DL (ref 130–200)
CHOLEST/HDLC SERPL: 3.36 {RATIO}
CO2 SERPL-SCNC: 30 MMOL/L (ref 24–31)
CREAT SERPL-MCNC: 0.65 MG/DL (ref 0.5–1.4)
EOSINOPHIL # BLD AUTO: 0.34 10*3/MM3 (ref 0–0.7)
EOSINOPHIL NFR BLD AUTO: 5.3 % (ref 0–4)
ERYTHROCYTE [DISTWIDTH] IN BLOOD BY AUTOMATED COUNT: 14.6 % (ref 12–15)
GLOBULIN SER CALC-MCNC: 2.8 GM/DL
GLUCOSE SERPL-MCNC: 103 MG/DL (ref 70–100)
HCT VFR BLD AUTO: 41.2 % (ref 37–47)
HDLC SERPL-MCNC: 44 MG/DL
HGB BLD-MCNC: 12.7 G/DL (ref 12–16)
IMM GRANULOCYTES # BLD: 0.02 10*3/MM3 (ref 0–0.03)
IMM GRANULOCYTES NFR BLD: 0.3 % (ref 0–5)
LDLC SERPL CALC-MCNC: 91 MG/DL (ref 0–99)
LYMPHOCYTES # BLD AUTO: 2.38 10*3/MM3 (ref 0.72–4.86)
LYMPHOCYTES NFR BLD AUTO: 37.3 % (ref 15–45)
MCH RBC QN AUTO: 24.2 PG (ref 28–32)
MCHC RBC AUTO-ENTMCNC: 30.8 G/DL (ref 33–36)
MCV RBC AUTO: 78.6 FL (ref 82–98)
MONOCYTES # BLD AUTO: 0.47 10*3/MM3 (ref 0.19–1.3)
MONOCYTES NFR BLD AUTO: 7.4 % (ref 4–12)
NEUTROPHILS # BLD AUTO: 3.11 10*3/MM3 (ref 1.87–8.4)
NEUTROPHILS NFR BLD AUTO: 48.8 % (ref 39–78)
NRBC BLD AUTO-RTO: 0 /100 WBC (ref 0–0)
PLATELET # BLD AUTO: 259 10*3/MM3 (ref 130–400)
POTASSIUM SERPL-SCNC: 4.2 MMOL/L (ref 3.5–5.3)
PROT SERPL-MCNC: 6.9 G/DL (ref 6.3–8.7)
RBC # BLD AUTO: 5.24 10*6/MM3 (ref 4.2–5.4)
SODIUM SERPL-SCNC: 144 MMOL/L (ref 135–145)
TRIGL SERPL-MCNC: 63 MG/DL (ref 0–149)
TSH SERPL DL<=0.005 MIU/L-ACNC: 1.18 MIU/ML (ref 0.47–4.68)
VLDLC SERPL CALC-MCNC: 12.6 MG/DL
WBC # BLD AUTO: 6.38 10*3/MM3 (ref 4.8–10.8)

## 2018-08-21 PROCEDURE — 99213 OFFICE O/P EST LOW 20 MIN: CPT | Performed by: FAMILY MEDICINE

## 2018-08-21 NOTE — PROGRESS NOTES
"Subjective   Karen Tipton is a 63 y.o. female.     Chief Complaint   Patient presents with   • Follow-up     bp        History of Present Illness     she notes having good bp control tony moran or ha      Current Outpatient Prescriptions:   •  aspirin 81 MG EC tablet, Take  by mouth., Disp: , Rfl:   •  lisinopril-hydrochlorothiazide (PRINZIDE,ZESTORETIC) 10-12.5 MG per tablet, Take 1 tablet by mouth Daily., Disp: 90 tablet, Rfl: 0  •  therapeutic multivitamin-minerals (THERAGRAN-M) tablet, Take 1 tablet by mouth daily.  , Disp: , Rfl:   No Known Allergies    Past Medical History:   Diagnosis Date   • Hypertension      No past surgical history on file.    Review of Systems   Constitutional: Negative.    HENT: Negative.    Eyes: Negative.    Respiratory: Negative.    Cardiovascular: Negative.    Gastrointestinal: Negative.    Endocrine: Negative.    Genitourinary: Negative.    Musculoskeletal: Negative.    Skin: Negative.    Allergic/Immunologic: Negative.    Neurological: Negative.    Hematological: Negative.    Psychiatric/Behavioral: Negative.        Objective  /74   Pulse 78   Temp 98.8 °F (37.1 °C)   Resp 16   Ht 172.7 cm (67.99\")   Wt 80.7 kg (178 lb)   BMI 27.07 kg/m²   Physical Exam   Constitutional: She is oriented to person, place, and time. She appears well-developed and well-nourished.   HENT:   Head: Normocephalic and atraumatic.   Right Ear: External ear normal.   Left Ear: External ear normal.   Nose: Nose normal.   Mouth/Throat: Oropharynx is clear and moist.   Eyes: Pupils are equal, round, and reactive to light. Conjunctivae and EOM are normal.   Neck: Normal range of motion. Neck supple.   Cardiovascular: Normal rate, regular rhythm, normal heart sounds and intact distal pulses.    Pulmonary/Chest: Effort normal and breath sounds normal.   Abdominal: Soft. Bowel sounds are normal.   Musculoskeletal: Normal range of motion.   Neurological: She is alert and oriented to person, place, and " time.   Skin: Skin is warm. Capillary refill takes less than 2 seconds.   Psychiatric: She has a normal mood and affect. Her behavior is normal. Judgment and thought content normal.   Vitals reviewed.      Assessment/Plan   Karen was seen today for follow-up.    Diagnoses and all orders for this visit:    Essential hypertension  -     CBC w AUTO Differential  -     Comprehensive metabolic panel  -     Lipid Panel w/ Chol/HDL Ratio  -     TSH  -     Vitamin D 25 hydroxy      She says her mammo is up to date           Orders Placed This Encounter   Procedures   • Comprehensive metabolic panel   • Lipid Panel w/ Chol/HDL Ratio   • TSH   • Vitamin D 25 hydroxy   • CBC w AUTO Differential     Order Specific Question:   Manual Differential     Answer:   No       Follow up: 6 month(s)

## 2018-08-23 NOTE — PROGRESS NOTES
Pt informed all labs are stable. Vit D is on the lower side of normal. She says she will boost up her Vit D a little more

## 2018-11-29 DIAGNOSIS — Z12.39 BREAST CANCER SCREENING: Primary | ICD-10-CM

## 2018-11-30 DIAGNOSIS — R92.1 CALCIFICATION OF RIGHT BREAST: ICD-10-CM

## 2018-11-30 DIAGNOSIS — Z12.39 BREAST CANCER SCREENING: Primary | ICD-10-CM

## 2019-01-03 DIAGNOSIS — Z12.39 BREAST CANCER SCREENING: Primary | ICD-10-CM

## 2019-01-04 ENCOUNTER — OFFICE VISIT (OUTPATIENT)
Dept: SURGERY | Age: 64
End: 2019-01-04
Payer: COMMERCIAL

## 2019-01-04 ENCOUNTER — HOSPITAL ENCOUNTER (OUTPATIENT)
Dept: WOMENS IMAGING | Age: 64
Discharge: HOME | End: 2019-01-04
Payer: MEDICARE

## 2019-01-04 VITALS
DIASTOLIC BLOOD PRESSURE: 80 MMHG | WEIGHT: 177 LBS | HEIGHT: 68 IN | HEART RATE: 68 BPM | BODY MASS INDEX: 26.83 KG/M2 | SYSTOLIC BLOOD PRESSURE: 144 MMHG

## 2019-01-04 DIAGNOSIS — R92.0 MICROCALCIFICATIONS OF THE BREAST: Primary | ICD-10-CM

## 2019-01-04 DIAGNOSIS — R92.1 BREAST CALCIFICATIONS: ICD-10-CM

## 2019-01-04 PROCEDURE — G8484 FLU IMMUNIZE NO ADMIN: HCPCS | Performed by: PHYSICIAN ASSISTANT

## 2019-01-04 PROCEDURE — 1036F TOBACCO NON-USER: CPT | Performed by: PHYSICIAN ASSISTANT

## 2019-01-04 PROCEDURE — G8419 CALC BMI OUT NRM PARAM NOF/U: HCPCS | Performed by: PHYSICIAN ASSISTANT

## 2019-01-04 PROCEDURE — 3017F COLORECTAL CA SCREEN DOC REV: CPT | Performed by: PHYSICIAN ASSISTANT

## 2019-01-04 PROCEDURE — 99212 OFFICE O/P EST SF 10 MIN: CPT | Performed by: PHYSICIAN ASSISTANT

## 2019-01-04 PROCEDURE — G8427 DOCREV CUR MEDS BY ELIG CLIN: HCPCS | Performed by: PHYSICIAN ASSISTANT

## 2019-01-04 PROCEDURE — 77065 DX MAMMO INCL CAD UNI: CPT

## 2019-01-04 RX ORDER — DIAZEPAM 5 MG/1
TABLET ORAL
Qty: 10 TABLET | Refills: 0 | OUTPATIENT
Start: 2019-01-04 | End: 2019-01-31

## 2019-01-14 ENCOUNTER — HOSPITAL ENCOUNTER (OUTPATIENT)
Dept: WOMENS IMAGING | Age: 64
Discharge: HOME | End: 2019-01-14
Payer: MEDICARE

## 2019-01-14 DIAGNOSIS — R92.0 MICROCALCIFICATIONS OF THE BREAST: ICD-10-CM

## 2019-01-14 DIAGNOSIS — R92.8 ABNORMAL MAMMOGRAM: ICD-10-CM

## 2019-01-14 PROCEDURE — 77065 DX MAMMO INCL CAD UNI: CPT

## 2019-01-14 PROCEDURE — 88305 TISSUE EXAM BY PATHOLOGIST: CPT

## 2019-01-14 PROCEDURE — 19081 BX BREAST 1ST LESION STRTCTC: CPT

## 2019-01-18 ENCOUNTER — TELEPHONE (OUTPATIENT)
Dept: SURGERY | Age: 64
End: 2019-01-18

## 2019-02-01 ENCOUNTER — OFFICE VISIT (OUTPATIENT)
Dept: SURGERY | Age: 64
End: 2019-02-01
Payer: MEDICARE

## 2019-02-01 VITALS — BODY MASS INDEX: 27.28 KG/M2 | WEIGHT: 180 LBS | HEIGHT: 68 IN

## 2019-02-01 DIAGNOSIS — R92.0 MICROCALCIFICATIONS OF THE BREAST: Primary | ICD-10-CM

## 2019-02-01 PROCEDURE — G8427 DOCREV CUR MEDS BY ELIG CLIN: HCPCS | Performed by: PHYSICIAN ASSISTANT

## 2019-02-01 PROCEDURE — G8419 CALC BMI OUT NRM PARAM NOF/U: HCPCS | Performed by: PHYSICIAN ASSISTANT

## 2019-02-01 PROCEDURE — 4040F PNEUMOC VAC/ADMIN/RCVD: CPT | Performed by: PHYSICIAN ASSISTANT

## 2019-02-01 PROCEDURE — G8484 FLU IMMUNIZE NO ADMIN: HCPCS | Performed by: PHYSICIAN ASSISTANT

## 2019-02-01 PROCEDURE — 1036F TOBACCO NON-USER: CPT | Performed by: PHYSICIAN ASSISTANT

## 2019-02-01 PROCEDURE — G8400 PT W/DXA NO RESULTS DOC: HCPCS | Performed by: PHYSICIAN ASSISTANT

## 2019-02-01 PROCEDURE — 1101F PT FALLS ASSESS-DOCD LE1/YR: CPT | Performed by: PHYSICIAN ASSISTANT

## 2019-02-01 PROCEDURE — 3017F COLORECTAL CA SCREEN DOC REV: CPT | Performed by: PHYSICIAN ASSISTANT

## 2019-02-01 PROCEDURE — 99213 OFFICE O/P EST LOW 20 MIN: CPT | Performed by: PHYSICIAN ASSISTANT

## 2019-02-01 PROCEDURE — 1123F ACP DISCUSS/DSCN MKR DOCD: CPT | Performed by: PHYSICIAN ASSISTANT

## 2019-02-01 PROCEDURE — 1090F PRES/ABSN URINE INCON ASSESS: CPT | Performed by: PHYSICIAN ASSISTANT

## 2019-02-21 ENCOUNTER — OFFICE VISIT (OUTPATIENT)
Dept: FAMILY MEDICINE CLINIC | Facility: CLINIC | Age: 64
End: 2019-02-21

## 2019-02-21 VITALS
OXYGEN SATURATION: 98 % | HEIGHT: 68 IN | RESPIRATION RATE: 16 BRPM | DIASTOLIC BLOOD PRESSURE: 68 MMHG | TEMPERATURE: 98.8 F | SYSTOLIC BLOOD PRESSURE: 132 MMHG | WEIGHT: 180 LBS | BODY MASS INDEX: 27.28 KG/M2 | HEART RATE: 70 BPM

## 2019-02-21 DIAGNOSIS — I10 ESSENTIAL HYPERTENSION: Primary | ICD-10-CM

## 2019-02-21 PROCEDURE — 99213 OFFICE O/P EST LOW 20 MIN: CPT | Performed by: FAMILY MEDICINE

## 2019-02-21 RX ORDER — ASPIRIN 81 MG/1
81 TABLET ORAL
COMMUNITY

## 2019-02-21 NOTE — PROGRESS NOTES
"Subjective   Karen Tipton is a 64 y.o. female.     Chief Complaint   Patient presents with   • Follow-up     6mo htn        History of Present Illness     she says her bp has been doing well without cp or ha--      Current Outpatient Medications:   •  Multiple Vitamin (MULTI-VITAMIN DAILY PO), Take  by mouth., Disp: , Rfl:   •  aspirin 81 MG EC tablet, Take 81 mg by mouth., Disp: , Rfl:   •  lisinopril-hydrochlorothiazide (PRINZIDE,ZESTORETIC) 10-12.5 MG per tablet, Take 1 tablet by mouth Daily., Disp: 90 tablet, Rfl: 0  No Known Allergies    Past Medical History:   Diagnosis Date   • Hypertension      No past surgical history on file.    Review of Systems   Constitutional: Negative.    HENT: Negative.    Eyes: Negative.    Respiratory: Negative.    Cardiovascular: Negative.    Gastrointestinal: Negative.    Endocrine: Negative.    Genitourinary: Negative.    Musculoskeletal: Negative.    Skin: Negative.    Allergic/Immunologic: Negative.    Neurological: Negative.    Hematological: Negative.    Psychiatric/Behavioral: Negative.        Objective  /68   Pulse 70   Temp 98.8 °F (37.1 °C)   Resp 16   Ht 172.7 cm (67.99\")   Wt 81.6 kg (180 lb)   SpO2 98%   BMI 27.38 kg/m²   Physical Exam   Constitutional: She is oriented to person, place, and time. She appears well-developed and well-nourished.   HENT:   Head: Normocephalic and atraumatic.   Right Ear: External ear normal.   Left Ear: External ear normal.   Nose: Nose normal.   Mouth/Throat: Oropharynx is clear and moist.   Eyes: Conjunctivae and EOM are normal. Pupils are equal, round, and reactive to light.   Neck: Normal range of motion. Neck supple.   Cardiovascular: Normal rate, regular rhythm, normal heart sounds and intact distal pulses.   Pulmonary/Chest: Effort normal and breath sounds normal.   Abdominal: Soft. Bowel sounds are normal.   Musculoskeletal: Normal range of motion.   Neurological: She is alert and oriented to person, place, and time. "   Skin: Skin is warm. Capillary refill takes less than 2 seconds.   Psychiatric: She has a normal mood and affect. Her behavior is normal. Judgment and thought content normal.   Nursing note and vitals reviewed.      Assessment/Plan   Karen was seen today for follow-up.    Diagnoses and all orders for this visit:    Essential hypertension                 No orders of the defined types were placed in this encounter.      Follow up: 6 month(s)

## 2019-04-24 ENCOUNTER — TELEPHONE (OUTPATIENT)
Dept: SURGERY | Age: 64
End: 2019-04-24

## 2019-04-24 NOTE — TELEPHONE ENCOUNTER
I called patient today at [phone], no answer, left vm telling her I was calling to inform her of some appt info: mammo @ [facility] on 5/16/19 @ 0900 & then appt at our office to see Diego Palomo on 5/20/19 @ 0915.

## 2019-05-16 ENCOUNTER — HOSPITAL ENCOUNTER (OUTPATIENT)
Dept: WOMENS IMAGING | Age: 64
Discharge: HOME | End: 2019-05-16
Payer: MEDICARE

## 2019-05-16 DIAGNOSIS — R92.0 MICROCALCIFICATIONS OF THE BREAST: ICD-10-CM

## 2019-05-16 PROCEDURE — 77066 DX MAMMO INCL CAD BI: CPT

## 2019-05-20 ENCOUNTER — OFFICE VISIT (OUTPATIENT)
Dept: SURGERY | Age: 64
End: 2019-05-20
Payer: MEDICARE

## 2019-05-20 VITALS — BODY MASS INDEX: 27.58 KG/M2 | HEIGHT: 68 IN | TEMPERATURE: 98.2 F | WEIGHT: 182 LBS

## 2019-05-20 DIAGNOSIS — Z12.39 SCREENING BREAST EXAMINATION: Primary | ICD-10-CM

## 2019-05-20 PROCEDURE — 4040F PNEUMOC VAC/ADMIN/RCVD: CPT | Performed by: PHYSICIAN ASSISTANT

## 2019-05-20 PROCEDURE — 1036F TOBACCO NON-USER: CPT | Performed by: PHYSICIAN ASSISTANT

## 2019-05-20 PROCEDURE — G8427 DOCREV CUR MEDS BY ELIG CLIN: HCPCS | Performed by: PHYSICIAN ASSISTANT

## 2019-05-20 PROCEDURE — 1090F PRES/ABSN URINE INCON ASSESS: CPT | Performed by: PHYSICIAN ASSISTANT

## 2019-05-20 PROCEDURE — 99213 OFFICE O/P EST LOW 20 MIN: CPT | Performed by: PHYSICIAN ASSISTANT

## 2019-05-20 PROCEDURE — 1123F ACP DISCUSS/DSCN MKR DOCD: CPT | Performed by: PHYSICIAN ASSISTANT

## 2019-05-20 PROCEDURE — G8419 CALC BMI OUT NRM PARAM NOF/U: HCPCS | Performed by: PHYSICIAN ASSISTANT

## 2019-05-20 PROCEDURE — 3017F COLORECTAL CA SCREEN DOC REV: CPT | Performed by: PHYSICIAN ASSISTANT

## 2019-05-20 PROCEDURE — G8400 PT W/DXA NO RESULTS DOC: HCPCS | Performed by: PHYSICIAN ASSISTANT

## 2019-05-20 NOTE — PROGRESS NOTES
HPI:  Patricia Hoffman is in for 6 month follow-up breast check following a benign biopsy. She has not noticed any changes in her breasts. History: Six-month follow-up after benign biopsy of right breast   calcifications. Previous benign left breast biopsies. Diagnostic mammogram: Bilateral craniocaudal and mediolateral oblique   views the breast are obtained. Computer-aided detection and brigida   synthesis utilized for today's exam. Multiple moles are marked over   each breast. Scar marker placed over the lateral left breast at the   site of her prior excisional biopsy. COMPARISON: 5/18/2018, 5/17/2017, 5/16/2016   FINDINGS: There breast tissue is heterogeneously dense, category C. Biopsy clip is present within the posterior upper outer quadrant of   the left and right breast. There is no reaccumulation of   calcifications at the site of the most recent benign biopsy in the   upper outer quadrant of the right breast. There are scattered   bilateral calcifications with no new or increasing cluster of   microcalcifications identified. There is no architectural distortion. Breast tissue stable in configuration. No enlarged lymph nodes seen   within the visible portion of the axilla.       Impression   1. No mammographic evidence of malignancy. Return to annual screening   mammography recommended if no new clinical concerns developed in the   interim. 2. BI-RADS CATEGORY 2: BENIGN FINDINGS. BREAST EXAM:  On examination, she has fibrocystic changes throughout both breasts, no dominant masses, no skin or nipple changes and no axillary adenopathy. I see nothing suspicious for breast cancer. ASSESSMENT:  Benign fibrocystic changes                              DISCUSSION:  I have stressed the importance of self breast exam and have explained the technique to her. We also discussed the pathophysiology of fibrocystic disease and breast cancer. She expresses good understanding.   We also discussed multiple other issues regarding her and her family's health. PLAN:  I will plan to see her back in 1 year for physical exam and bilateral mammograms. She will contact me if anything significant changes. I spent over 50% of visit time counseling patient. 15 minutes of face to face time with patient.

## 2019-06-17 ENCOUNTER — OFFICE VISIT (OUTPATIENT)
Dept: GASTROENTEROLOGY | Facility: CLINIC | Age: 64
End: 2019-06-17

## 2019-06-17 VITALS
DIASTOLIC BLOOD PRESSURE: 68 MMHG | HEART RATE: 72 BPM | SYSTOLIC BLOOD PRESSURE: 130 MMHG | OXYGEN SATURATION: 99 % | BODY MASS INDEX: 27.43 KG/M2 | WEIGHT: 181 LBS | HEIGHT: 68 IN | TEMPERATURE: 96.8 F

## 2019-06-17 DIAGNOSIS — I10 ESSENTIAL HYPERTENSION: ICD-10-CM

## 2019-06-17 DIAGNOSIS — Z86.010 HX OF COLONIC POLYP: Primary | ICD-10-CM

## 2019-06-17 DIAGNOSIS — Z80.0 FAMILY HX OF COLON CANCER: ICD-10-CM

## 2019-06-17 PROBLEM — Z86.0100 HX OF COLONIC POLYP: Status: ACTIVE | Noted: 2019-06-17

## 2019-06-17 PROCEDURE — S0260 H&P FOR SURGERY: HCPCS | Performed by: NURSE PRACTITIONER

## 2019-06-17 NOTE — PROGRESS NOTES
[facility] Gastroenterology    Primary Physician Jamin Brambila MD    6/17/2019    Karen Tipton   1955      Chief Complaint   Patient presents with   • Colonoscopy       Subjective     HPI    Karen Tipton is a 64 y.o. female who presents as a referral for preventative maintenance. She has no complaints of nausea or vomiting. No change in bowels. No wt loss. No BRBPR. No melena. No abdominal pain.        Last colonoscopy was 6/2014 three polyps destroyed, recall recommended 5 years.  The patient does  have history of colon polyps. The patient does not have history of colon cancer.   There is no family history of colon polyps. There is  family history of colon cancer paternal GF.     Past Medical History:   Diagnosis Date   • Colon polyp    • Hypertension        Past Surgical History:   Procedure Laterality Date   • BREAST BIOPSY     • COLONOSCOPY  06/10/2014    3 polyps destroyed       Outpatient Medications Marked as Taking for the 6/17/19 encounter (Office Visit) with Mary Rodgers APRN   Medication Sig Dispense Refill   • aspirin 81 MG EC tablet Take 81 mg by mouth.     • Cholecalciferol (VITAMIN D PO) Take  by mouth Daily.     • lisinopril-hydrochlorothiazide (PRINZIDE,ZESTORETIC) 10-12.5 MG per tablet Take 1 tablet by mouth Daily. 90 tablet 0   • Multiple Vitamin (MULTI-VITAMIN DAILY PO) Take  by mouth.     • ROYAL JELLY PO Take  by mouth Daily.         No Known Allergies    Social History     Socioeconomic History   • Marital status:      Spouse name: Not on file   • Number of children: Not on file   • Years of education: Not on file   • Highest education level: Not on file   Tobacco Use   • Smoking status: Never Smoker   • Smokeless tobacco: Never Used   Substance and Sexual Activity   • Alcohol use: Yes     Comment: rare   • Drug use: No   • Sexual activity: Defer       Family History   Problem Relation Age of Onset   • Stroke Mother    • Dementia Mother    • Cancer Father          protate   • Colon cancer Paternal Grandfather        Review of Systems   Constitutional: Negative for appetite change, chills, fatigue, fever and unexpected weight change.   HENT: Negative for sore throat and trouble swallowing.    Eyes: Negative for visual disturbance.   Respiratory: Negative for cough, chest tightness, shortness of breath and wheezing.    Cardiovascular: Negative for chest pain and palpitations.   Gastrointestinal: Negative for abdominal distention, abdominal pain, anal bleeding, blood in stool, constipation, diarrhea, nausea and vomiting.        As mentioned in hpi   Genitourinary: Negative for difficulty urinating and hematuria.   Musculoskeletal: Positive for back pain (chronic ). Negative for arthralgias.   Skin: Negative for color change and rash.   Neurological: Negative for dizziness, seizures, syncope, light-headedness and headaches.   Hematological: Negative for adenopathy.   Psychiatric/Behavioral: Negative for confusion. The patient is not nervous/anxious.        Objective     Vitals:    06/17/19 0817   BP: 130/68   Pulse: 72   Temp: 96.8 °F (36 °C)   SpO2: 99%         06/17/19 0817   Weight: 82.1 kg (181 lb)     Body mass index is 27.52 kg/m².    Physical Exam   Constitutional: She appears well-developed and well-nourished. No distress.   HENT:   Head: Normocephalic and atraumatic.   Eyes: EOM are normal. No scleral icterus.   Neck: Neck supple. No JVD present.   Cardiovascular: Normal rate, regular rhythm and normal heart sounds.   Pulmonary/Chest: Effort normal and breath sounds normal.   Abdominal: Soft. Bowel sounds are normal. She exhibits no distension. There is no tenderness.   Musculoskeletal: Normal range of motion. She exhibits no deformity.   Neurological: She is alert.   Skin: Skin is warm and dry. No rash noted.   Psychiatric: She has a normal mood and affect. Her behavior is normal.   Vitals reviewed.      Imaging Results (most recent)     None           Assessment/Plan     Karen was seen today for colonoscopy.    Diagnoses and all orders for this visit:    Hx of colonic polyp  -     Case Request; Standing  -     Case Request    Family hx of colon cancer    Essential hypertension    Other orders  -     Follow Anesthesia Guidelines / Standing Orders; Future  -     Implement Anesthesia Orders Day of Procedure; Standing  -     Obtain Informed Consent; Standing  -     Obtain Informed Consent; Future  -     Sod Picosulfate-Mag Ox-Cit Acd (CLENPIQ) 10-3.5-12 MG-GM -GM/160ML solution; Take 2 bottles by mouth 1 (One) Time for 1 dose. Take as directed    Plan for colonoscopy. The patient was advised to take any blood pressure or heart  medications the morning of  procedure if that is when he/she normally takes.             Body mass index is 27.52 kg/m².    Patient's Body mass index is 27.52 kg/m². BMI is within normal parameters. No follow-up required..      COLONOSCOPY WITH ANESTHESIA (N/A)  All risks, benefits, alternatives, and indications of colonoscopy procedure have been discussed with the patient. Risks to include perforation of the colon requiring possible surgery or colostomy, risk of bleeding from biopsies or removal of colon tissue, possibility of missing a colon polyp or cancer, or adverse drug reaction.  Benefits to include the diagnosis and management of disease of the colon and rectum. Alternatives to include barium enema, radiographic evaluation, lab testing or no intervention. Pt verbalizes understanding and agrees.       YANNICK Kelly      EMR Dragon/transcription disclaimer:  Much of this encounter note is electronic transcription/translation of spoken language to printed text.  The electronic translation of spoken language may be erroneous, or at times, nonsensical words or phrases may be inadvertently transcribed.  Although I have reviewed the note for such errors, some may still exist.

## 2019-08-05 ENCOUNTER — ANESTHESIA EVENT (OUTPATIENT)
Dept: GASTROENTEROLOGY | Facility: HOSPITAL | Age: 64
End: 2019-08-05

## 2019-08-05 ENCOUNTER — HOSPITAL ENCOUNTER (OUTPATIENT)
Facility: HOSPITAL | Age: 64
Setting detail: HOSPITAL OUTPATIENT SURGERY
Discharge: HOME | End: 2019-08-05
Attending: INTERNAL MEDICINE | Admitting: INTERNAL MEDICINE

## 2019-08-05 ENCOUNTER — TELEPHONE (OUTPATIENT)
Dept: GASTROENTEROLOGY | Facility: CLINIC | Age: 64
End: 2019-08-05

## 2019-08-05 ENCOUNTER — ANESTHESIA (OUTPATIENT)
Dept: GASTROENTEROLOGY | Facility: HOSPITAL | Age: 64
End: 2019-08-05

## 2019-08-05 VITALS
HEART RATE: 58 BPM | WEIGHT: 169 LBS | SYSTOLIC BLOOD PRESSURE: 109 MMHG | BODY MASS INDEX: 25.61 KG/M2 | RESPIRATION RATE: 18 BRPM | DIASTOLIC BLOOD PRESSURE: 70 MMHG | HEIGHT: 68 IN | OXYGEN SATURATION: 99 % | TEMPERATURE: 97.2 F

## 2019-08-05 DIAGNOSIS — Z86.010 HX OF COLONIC POLYP: ICD-10-CM

## 2019-08-05 PROCEDURE — 25010000002 PROPOFOL 10 MG/ML EMULSION: Performed by: NURSE ANESTHETIST, CERTIFIED REGISTERED

## 2019-08-05 PROCEDURE — 88305 TISSUE EXAM BY PATHOLOGIST: CPT | Performed by: INTERNAL MEDICINE

## 2019-08-05 PROCEDURE — 45385 COLONOSCOPY W/LESION REMOVAL: CPT | Performed by: INTERNAL MEDICINE

## 2019-08-05 RX ORDER — PROPOFOL 10 MG/ML
VIAL (ML) INTRAVENOUS AS NEEDED
Status: DISCONTINUED | OUTPATIENT
Start: 2019-08-05 | End: 2019-08-05 | Stop reason: SURG

## 2019-08-05 RX ORDER — SODIUM CHLORIDE 9 MG/ML
500 INJECTION, SOLUTION INTRAVENOUS CONTINUOUS PRN
Status: DISCONTINUED | OUTPATIENT
Start: 2019-08-05 | End: 2019-08-05 | Stop reason: HOSPADM

## 2019-08-05 RX ORDER — ONDANSETRON 2 MG/ML
4 INJECTION INTRAMUSCULAR; INTRAVENOUS ONCE AS NEEDED
Status: DISCONTINUED | OUTPATIENT
Start: 2019-08-05 | End: 2019-08-05 | Stop reason: HOSPADM

## 2019-08-05 RX ORDER — SODIUM CHLORIDE 0.9 % (FLUSH) 0.9 %
3 SYRINGE (ML) INJECTION AS NEEDED
Status: DISCONTINUED | OUTPATIENT
Start: 2019-08-05 | End: 2019-08-05 | Stop reason: HOSPADM

## 2019-08-05 RX ADMIN — SODIUM CHLORIDE 500 ML: 9 INJECTION, SOLUTION INTRAVENOUS at 07:23

## 2019-08-05 RX ADMIN — PROPOFOL 250 MG: 10 INJECTION, EMULSION INTRAVENOUS at 07:53

## 2019-08-05 NOTE — ANESTHESIA PREPROCEDURE EVALUATION
Anesthesia Evaluation     Patient summary reviewed   no history of anesthetic complications:  NPO Solid Status: > 8 hours             Airway   Mallampati: II  TM distance: >3 FB  Neck ROM: full  Dental      Pulmonary - negative pulmonary ROS   Cardiovascular   Exercise tolerance: excellent (>7 METS)    (+) hypertension,       Neuro/Psych- negative ROS  GI/Hepatic/Renal/Endo - negative ROS     Musculoskeletal     Abdominal    Substance History      OB/GYN          Other                        Anesthesia Plan    ASA 2     MAC     Anesthetic plan, all risks, benefits, and alternatives have been provided, discussed and informed consent has been obtained with: patient.

## 2019-08-05 NOTE — H&P
"[facility] Gastroenterology  Pre Procedure History & Physical    Chief Complaint:   Colon polyps    Subjective     HPI:   The patient has a history of colon polyps who presents for exam.    Past Medical History:   Past Medical History:   Diagnosis Date   • Colon polyp    • Hypertension        Past Surgical History:  Past Surgical History:   Procedure Laterality Date   • BREAST BIOPSY     • COLONOSCOPY  06/10/2014    3 polyps destroyed       Family History:  Family History   Problem Relation Age of Onset   • Stroke Mother    • Dementia Mother    • Cancer Father         protate   • Colon cancer Paternal Grandfather        Social History:   reports that she has never smoked. She has never used smokeless tobacco. She reports that she drinks alcohol. She reports that she does not use drugs.    Medications:   Prior to Admission medications    Medication Sig Start Date End Date Taking? Authorizing Provider   lisinopril-hydrochlorothiazide (PRINZIDE,ZESTORETIC) 10-12.5 MG per tablet Take 1 tablet by mouth Daily. 12/4/17  Yes Jamin Brambila MD   aspirin 81 MG EC tablet Take 81 mg by mouth.    ProviderDoug MD   Cholecalciferol (VITAMIN D PO) Take  by mouth Daily.    Doug Valero MD   Multiple Vitamin (MULTI-VITAMIN DAILY PO) Take  by mouth.    Doug Valero MD   ROYAL JELLY PO Take  by mouth Daily.    Doug Valero MD       Allergies:  Patient has no known allergies.    ROS:    General: Weight stable  Resp: No SOA  Cardiovascular: No CP    Objective     Blood pressure 136/60, pulse 60, temperature 97.2 °F (36.2 °C), temperature source Temporal, resp. rate 18, height 172.7 cm (68\"), weight 76.7 kg (169 lb), SpO2 98 %.    Physical Exam   Constitutional: Pt is oriented to person, place, and in no distress.   HENT: Mouth/Throat: Oropharynx is clear.   Cardiovascular: Normal rate, regular rhythm.    Pulmonary/Chest: Effort normal. No respiratory distress. No  wheezes.   Abdominal: Soft. " Non-distended.  Skin: Skin is warm and dry.   Psychiatric: Mood, memory, affect and judgment appear normal.     Assessment/Plan     Diagnosis:  Colon polyps    Anticipated Surgical Procedure:  Colonoscopy    The risks, benefits, and alternatives of this procedure have been discussed with the patient or the responsible party- the patient understands and agrees to proceed.      EMR Dragon/transcription disclaimer:  Much of this encounter note is electronic transcription/translation of spoken language to printed text.  The electronic translation of spoken language may be erroneous, or at times, nonsensical words or phrases may be inadvertently transcribed.  Although I have reviewed the note for such errors, some may still exist.

## 2019-08-05 NOTE — ANESTHESIA POSTPROCEDURE EVALUATION
"Patient: Karen Tipton    Procedure Summary     Date:  08/05/19 Room / Location:  [facility] ENDOSCOPY 5 / [facility]COPY    Anesthesia Start:  0749 Anesthesia Stop:  0819    Procedure:  COLONOSCOPY WITH ANESTHESIA (N/A ) Diagnosis:       Hx of colonic polyp      (Hx of colonic polyp [Z86.010])    Surgeon:  Ismael Ramachandran MD Provider:  Cr Cardona CRNA    Anesthesia Type:  MAC ASA Status:  2          Anesthesia Type: MAC  Last vitals  BP   91/57 (08/05/19 0818)   Temp   97.2 °F (36.2 °C) (08/05/19 0652)   Pulse   64 (08/05/19 0818)   Resp   17 (08/05/19 0818)     SpO2   97 % (08/05/19 0818)     Post Anesthesia Care and Evaluation    Patient location during evaluation: PACU  Patient participation: complete - patient participated  Level of consciousness: awake and alert  Pain management: adequate  Airway patency: patent  Anesthetic complications: No anesthetic complications    Cardiovascular status: acceptable  Respiratory status: acceptable  Hydration status: acceptable    Comments: Blood pressure 91/57, pulse 64, temperature 97.2 °F (36.2 °C), temperature source Temporal, resp. rate 17, height 172.7 cm (68\"), weight 76.7 kg (169 lb), SpO2 97 %.    Pt discharged from PACU based on clement score >8      "

## 2019-08-13 LAB
CYTO UR: NORMAL
LAB AP CASE REPORT: NORMAL
PATH REPORT.FINAL DX SPEC: NORMAL
PATH REPORT.GROSS SPEC: NORMAL

## 2019-08-23 ENCOUNTER — OFFICE VISIT (OUTPATIENT)
Dept: FAMILY MEDICINE CLINIC | Facility: CLINIC | Age: 64
End: 2019-08-23

## 2019-08-23 VITALS
BODY MASS INDEX: 26.67 KG/M2 | SYSTOLIC BLOOD PRESSURE: 126 MMHG | HEART RATE: 68 BPM | TEMPERATURE: 98.5 F | RESPIRATION RATE: 16 BRPM | DIASTOLIC BLOOD PRESSURE: 70 MMHG | HEIGHT: 68 IN | OXYGEN SATURATION: 98 % | WEIGHT: 176 LBS

## 2019-08-23 DIAGNOSIS — I10 ESSENTIAL HYPERTENSION: Primary | ICD-10-CM

## 2019-08-23 DIAGNOSIS — E83.39 OTHER DISORDERS OF PHOSPHORUS METABOLISM: ICD-10-CM

## 2019-08-23 PROCEDURE — 99213 OFFICE O/P EST LOW 20 MIN: CPT | Performed by: FAMILY MEDICINE

## 2019-08-23 NOTE — PROGRESS NOTES
"Dario Tipton is a 64 y.o. female.     Chief Complaint   Patient presents with   • Follow-up     6mo       History of Present Illness     she nots good bp control wituout cp or ha      Current Outpatient Medications:   •  aspirin 81 MG EC tablet, Take 81 mg by mouth., Disp: , Rfl:   •  Cholecalciferol (VITAMIN D PO), Take  by mouth Daily., Disp: , Rfl:   •  lisinopril-hydrochlorothiazide (PRINZIDE,ZESTORETIC) 10-12.5 MG per tablet, Take 1 tablet by mouth Daily., Disp: 90 tablet, Rfl: 0  •  Multiple Vitamin (MULTI-VITAMIN DAILY PO), Take  by mouth., Disp: , Rfl:   •  ROYAL JELLY PO, Take  by mouth Daily., Disp: , Rfl:   No Known Allergies    Past Medical History:   Diagnosis Date   • Colon polyp    • Hypertension      Past Surgical History:   Procedure Laterality Date   • BREAST BIOPSY     • COLONOSCOPY  06/10/2014    3 polyps destroyed   • COLONOSCOPY N/A 8/5/2019    Procedure: COLONOSCOPY WITH ANESTHESIA;  Surgeon: Ismael Ramachandran MD;  Location: [facility] ENDOSCOPY;  Service: Gastroenterology       Review of Systems   Constitutional: Negative.    HENT: Negative.    Eyes: Negative.    Respiratory: Negative.    Cardiovascular: Negative.    Gastrointestinal: Negative.    Endocrine: Negative.    Genitourinary: Negative.    Musculoskeletal: Negative.    Skin: Negative.    Allergic/Immunologic: Negative.    Neurological: Negative.    Hematological: Negative.    Psychiatric/Behavioral: Negative.        Objective  /70   Pulse 68   Temp 98.5 °F (36.9 °C)   Resp 16   Ht 172.7 cm (67.99\")   Wt 79.8 kg (176 lb)   SpO2 98%   BMI 26.77 kg/m²   Physical Exam   Constitutional: She is oriented to person, place, and time. She appears well-developed and well-nourished.   HENT:   Head: Normocephalic and atraumatic.   Right Ear: External ear normal.   Left Ear: External ear normal.   Nose: Nose normal.   Mouth/Throat: Oropharynx is clear and moist.   Eyes: Conjunctivae and EOM are normal. Pupils are equal, " round, and reactive to light.   Neck: Normal range of motion. Neck supple.   Cardiovascular: Normal rate, regular rhythm, normal heart sounds and intact distal pulses.   Pulmonary/Chest: Effort normal and breath sounds normal.   Abdominal: Soft. Bowel sounds are normal.   Musculoskeletal: Normal range of motion.   Neurological: She is alert and oriented to person, place, and time.   Skin: Skin is warm. Capillary refill takes less than 2 seconds.   Psychiatric: She has a normal mood and affect. Her behavior is normal. Judgment and thought content normal.   Nursing note and vitals reviewed.      Assessment/Plan   Karen was seen today for follow-up.    Diagnoses and all orders for this visit:    Essential hypertension  -     CBC w AUTO Differential  -     Comprehensive metabolic panel  -     Lipid Panel With / Chol / HDL Ratio  -     Vitamin D 25 Hydroxy    Other disorders of phosphorus metabolism   -     Vitamin D 25 Hydroxy        She says her colon and mammo are up to date       Orders Placed This Encounter   Procedures   • Comprehensive metabolic panel   • Lipid Panel With / Chol / HDL Ratio   • Vitamin D 25 Hydroxy   • CBC w AUTO Differential     Order Specific Question:   Manual Differential     Answer:   No       Follow up: 6 month(s)

## 2019-08-24 LAB
25(OH)D3+25(OH)D2 SERPL-MCNC: 37.1 NG/ML (ref 30–100)
ALBUMIN SERPL-MCNC: 4.3 G/DL (ref 3.5–5.2)
ALBUMIN/GLOB SERPL: 1.7 G/DL
ALP SERPL-CCNC: 80 U/L (ref 39–117)
ALT SERPL-CCNC: 23 U/L (ref 1–33)
AST SERPL-CCNC: 28 U/L (ref 1–32)
BASOPHILS # BLD AUTO: 0.08 10*3/MM3 (ref 0–0.2)
BASOPHILS NFR BLD AUTO: 1.2 % (ref 0–1.5)
BILIRUB SERPL-MCNC: 0.4 MG/DL (ref 0.2–1.2)
BUN SERPL-MCNC: 11 MG/DL (ref 8–23)
BUN/CREAT SERPL: 14.1 (ref 7–25)
CALCIUM SERPL-MCNC: 9.6 MG/DL (ref 8.6–10.5)
CHLORIDE SERPL-SCNC: 103 MMOL/L (ref 98–107)
CHOLEST SERPL-MCNC: 156 MG/DL (ref 0–200)
CHOLEST/HDLC SERPL: 3.32 {RATIO}
CO2 SERPL-SCNC: 30.1 MMOL/L (ref 22–29)
CREAT SERPL-MCNC: 0.78 MG/DL (ref 0.57–1)
EOSINOPHIL # BLD AUTO: 0.25 10*3/MM3 (ref 0–0.4)
EOSINOPHIL NFR BLD AUTO: 3.7 % (ref 0.3–6.2)
ERYTHROCYTE [DISTWIDTH] IN BLOOD BY AUTOMATED COUNT: 15.1 % (ref 12.3–15.4)
GLOBULIN SER CALC-MCNC: 2.5 GM/DL
GLUCOSE SERPL-MCNC: 99 MG/DL (ref 65–99)
HCT VFR BLD AUTO: 43.6 % (ref 34–46.6)
HDLC SERPL-MCNC: 47 MG/DL (ref 40–60)
HGB BLD-MCNC: 12.8 G/DL (ref 12–15.9)
IMM GRANULOCYTES # BLD AUTO: 0.04 10*3/MM3 (ref 0–0.05)
IMM GRANULOCYTES NFR BLD AUTO: 0.6 % (ref 0–0.5)
LDLC SERPL CALC-MCNC: 86 MG/DL (ref 0–100)
LYMPHOCYTES # BLD AUTO: 2.34 10*3/MM3 (ref 0.7–3.1)
LYMPHOCYTES NFR BLD AUTO: 34.8 % (ref 19.6–45.3)
MCH RBC QN AUTO: 23.8 PG (ref 26.6–33)
MCHC RBC AUTO-ENTMCNC: 29.4 G/DL (ref 31.5–35.7)
MCV RBC AUTO: 81 FL (ref 79–97)
MONOCYTES # BLD AUTO: 0.52 10*3/MM3 (ref 0.1–0.9)
MONOCYTES NFR BLD AUTO: 7.7 % (ref 5–12)
NEUTROPHILS # BLD AUTO: 3.5 10*3/MM3 (ref 1.7–7)
NEUTROPHILS NFR BLD AUTO: 52 % (ref 42.7–76)
NRBC BLD AUTO-RTO: 0 /100 WBC (ref 0–0.2)
PLATELET # BLD AUTO: 275 10*3/MM3 (ref 140–450)
POTASSIUM SERPL-SCNC: 4.8 MMOL/L (ref 3.5–5.2)
PROT SERPL-MCNC: 6.8 G/DL (ref 6–8.5)
RBC # BLD AUTO: 5.38 10*6/MM3 (ref 3.77–5.28)
SODIUM SERPL-SCNC: 145 MMOL/L (ref 136–145)
TRIGL SERPL-MCNC: 114 MG/DL (ref 0–150)
VLDLC SERPL CALC-MCNC: 22.8 MG/DL
WBC # BLD AUTO: 6.73 10*3/MM3 (ref 3.4–10.8)

## 2019-09-04 RX ORDER — LISINOPRIL AND HYDROCHLOROTHIAZIDE 12.5; 1 MG/1; MG/1
TABLET ORAL
Qty: 90 TABLET | Refills: 4 | Status: SHIPPED | OUTPATIENT
Start: 2019-09-04 | End: 2020-09-14

## 2020-02-21 ENCOUNTER — OFFICE VISIT (OUTPATIENT)
Dept: FAMILY MEDICINE CLINIC | Facility: CLINIC | Age: 65
End: 2020-02-21

## 2020-02-21 VITALS
OXYGEN SATURATION: 98 % | DIASTOLIC BLOOD PRESSURE: 82 MMHG | SYSTOLIC BLOOD PRESSURE: 126 MMHG | HEIGHT: 68 IN | RESPIRATION RATE: 16 BRPM | WEIGHT: 180 LBS | HEART RATE: 67 BPM | BODY MASS INDEX: 27.28 KG/M2

## 2020-02-21 DIAGNOSIS — I10 ESSENTIAL HYPERTENSION: Primary | ICD-10-CM

## 2020-02-21 DIAGNOSIS — H61.23 BILATERAL IMPACTED CERUMEN: ICD-10-CM

## 2020-02-21 PROCEDURE — G0439 PPPS, SUBSEQ VISIT: HCPCS | Performed by: FAMILY MEDICINE

## 2020-02-21 PROCEDURE — 99213 OFFICE O/P EST LOW 20 MIN: CPT | Performed by: FAMILY MEDICINE

## 2020-02-21 NOTE — PROGRESS NOTES
"Dario Tipton is a 65 y.o. female.     Chief Complaint   Patient presents with   • Follow-up     6 mo   htn  &  labs   • Medicare Wellness-subsequent   • Ear Fullness     pt states that her ears are full & has a buzzing sound        History of Present Illness     as above      Current Outpatient Medications:   •  aspirin 81 MG EC tablet, Take 81 mg by mouth., Disp: , Rfl:   •  Cholecalciferol (VITAMIN D PO), Take  by mouth Daily., Disp: , Rfl:   •  lisinopril-hydrochlorothiazide (PRINZIDE,ZESTORETIC) 10-12.5 MG per tablet, TAKE 1 TABLET BY MOUTH ONCE DAILY, Disp: 90 tablet, Rfl: 4  •  Multiple Vitamin (MULTI-VITAMIN DAILY PO), Take  by mouth., Disp: , Rfl:   •  ROYAL JELLY PO, Take  by mouth Daily., Disp: , Rfl:   No Known Allergies    Past Medical History:   Diagnosis Date   • Colon polyp    • Hypertension      Past Surgical History:   Procedure Laterality Date   • BREAST BIOPSY     • COLONOSCOPY  06/10/2014    3 polyps destroyed   • COLONOSCOPY N/A 8/5/2019    Procedure: COLONOSCOPY WITH ANESTHESIA;  Surgeon: Ismael Ramachandran MD;  Location: [facility] ENDOSCOPY;  Service: Gastroenterology       Review of Systems   Constitutional: Negative.    HENT: Positive for hearing loss and tinnitus.    Eyes: Negative.    Respiratory: Negative.    Cardiovascular: Negative.    Gastrointestinal: Negative.    Endocrine: Negative.    Genitourinary: Negative.    Musculoskeletal: Negative.    Skin: Negative.    Allergic/Immunologic: Negative.    Neurological: Negative.    Hematological: Negative.    Psychiatric/Behavioral: Negative.        Objective  /82   Pulse 67   Resp 16   Ht 172.7 cm (68\")   Wt 81.6 kg (180 lb)   SpO2 98%   BMI 27.37 kg/m²   Physical Exam   Constitutional: She is oriented to person, place, and time. She appears well-developed and well-nourished.   HENT:   Head: Normocephalic and atraumatic.   Right Ear: External ear normal.   Left Ear: External ear normal.   Nose: Nose normal. "   Mouth/Throat: Oropharynx is clear and moist.   Eyes: Pupils are equal, round, and reactive to light. Conjunctivae and EOM are normal.   Neck: Normal range of motion. Neck supple.   Cardiovascular: Normal rate, regular rhythm, normal heart sounds and intact distal pulses.   Pulmonary/Chest: Effort normal and breath sounds normal.   Abdominal: Soft. Bowel sounds are normal.   Musculoskeletal: Normal range of motion.   Neurological: She is alert and oriented to person, place, and time.   Skin: Skin is warm. Capillary refill takes less than 2 seconds.   Psychiatric: She has a normal mood and affect. Her behavior is normal. Judgment and thought content normal.   Nursing note and vitals reviewed.      Assessment/Plan   Karen was seen today for follow-up, medicare wellness-subsequent and ear fullness.    Diagnoses and all orders for this visit:    Essential hypertension    Bilateral impacted cerumen      Use debrox and come in next week for irrigation         No orders of the defined types were placed in this encounter.      Follow up: 6 month(s)

## 2020-02-21 NOTE — PROGRESS NOTES
The ABCs of the Annual Wellness Visit  Subsequent Medicare Wellness Visit    Chief Complaint   Patient presents with   • Follow-up     6 mo   htn  &  labs   • Medicare Wellness-subsequent   • Ear Fullness     pt states that her ears are full & has a buzzing sound       Subjective   History of Present Illness:  Karen Tipton is a 65 y.o. female who presents for a Subsequent Medicare Wellness Visit.    HEALTH RISK ASSESSMENT    Recent Hospitalizations:  No hospitalization(s) within the last year.    Current Medical Providers:  Patient Care Team:  Jamin Brambila MD as PCP - General  Annamaria Barnett PA-C as PCP - Claims Attributed  Ismael Ramachandran MD as Consulting Physician (Gastroenterology)    Smoking Status:  Social History     Tobacco Use   Smoking Status Never Smoker   Smokeless Tobacco Never Used       Alcohol Consumption:  Social History     Substance and Sexual Activity   Alcohol Use Yes    Comment: rare       Depression Screen:   PHQ-2/PHQ-9 Depression Screening 2/21/2020   Little interest or pleasure in doing things 0   Feeling down, depressed, or hopeless 0   Trouble falling or staying asleep, or sleeping too much 0   Feeling tired or having little energy 1   Poor appetite or overeating 0   Feeling bad about yourself - or that you are a failure or have let yourself or your family down 0   Trouble concentrating on things, such as reading the newspaper or watching television 0   Moving or speaking so slowly that other people could have noticed. Or the opposite - being so fidgety or restless that you have been moving around a lot more than usual 0   Thoughts that you would be better off dead, or of hurting yourself in some way 0   Total Score 1   If you checked off any problems, how difficult have these problems made it for you to do your work, take care of things at home, or get along with other people? Not difficult at all       Fall Risk Screen:  STEADI Fall Risk Assessment was completed, and  patient is at LOW risk for falls.Assessment completed on:2/21/2020    Health Habits and Functional and Cognitive Screening:  Functional & Cognitive Status 2/21/2020   Do you have difficulty preparing food and eating? No   Do you have difficulty bathing yourself, getting dressed or grooming yourself? No   Do you have difficulty using the toilet? No   Do you have difficulty moving around from place to place? No   Do you have trouble with steps or getting out of a bed or a chair? No   Current Diet Well Balanced Diet   Dental Exam Up to date   Eye Exam Up to date   Exercise (times per week) 4 times per week   Current Exercise Activities Include Cardiovasular Workout on Exercise Equipment   Do you need help using the phone?  No   Are you deaf or do you have serious difficulty hearing?  No   Do you need help with transportation? No   Do you need help shopping? No   Do you need help preparing meals?  No   Do you need help with housework?  No   Do you need help with laundry? No   Do you need help taking your medications? No   Do you need help managing money? No   Do you ever drive or ride in a car without wearing a seat belt? No   Have you felt unusual stress, anger or loneliness in the last month? No   Who do you live with? Spouse   If you need help, do you have trouble finding someone available to you? No   Have you been bothered in the last four weeks by sexual problems? No   Do you have difficulty concentrating, remembering or making decisions? No         Does the patient have evidence of cognitive impairment? No    Asprin use counseling:Taking ASA appropriately as indicated    Age-appropriate Screening Schedule:  Refer to the list below for future screening recommendations based on patient's age, sex and/or medical conditions. Orders for these recommended tests are listed in the plan section. The patient has been provided with a written plan.    Health Maintenance   Topic Date Due   • TDAP/TD VACCINES (1 - Tdap)  "01/29/1966   • ZOSTER VACCINE (1 of 2) 01/29/2004   • INFLUENZA VACCINE  08/01/2019   • MAMMOGRAM  05/16/2021   • COLONOSCOPY  08/05/2024          The following portions of the patient's history were reviewed and updated as appropriate: allergies, current medications, past family history, past medical history, past social history, past surgical history and problem list.    Outpatient Medications Prior to Visit   Medication Sig Dispense Refill   • aspirin 81 MG EC tablet Take 81 mg by mouth.     • Cholecalciferol (VITAMIN D PO) Take  by mouth Daily.     • lisinopril-hydrochlorothiazide (PRINZIDE,ZESTORETIC) 10-12.5 MG per tablet TAKE 1 TABLET BY MOUTH ONCE DAILY 90 tablet 4   • Multiple Vitamin (MULTI-VITAMIN DAILY PO) Take  by mouth.     • ROYAL JELLY PO Take  by mouth Daily.     • lisinopril-hydrochlorothiazide (PRINZIDE,ZESTORETIC) 10-12.5 MG per tablet Take 1 tablet by mouth Daily. 90 tablet 0     No facility-administered medications prior to visit.        Patient Active Problem List   Diagnosis   • Essential hypertension   • Low back pain without sciatica   • Hx of colonic polyp   • Bilateral impacted cerumen       Advanced Care Planning:  ACP discussion was held with the patient during this visit. Patient has an advance directive that is valid in EMR.     Review of Systems    Compared to one year ago, the patient feels her physical health is the same.  Compared to one year ago, the patient feels her mental health is the same.    Reviewed chart for potential of high risk medication in the elderly: yes  Reviewed chart for potential of harmful drug interactions in the elderly:yes    Objective         Vitals:    02/21/20 0816   BP: 126/82   Pulse: 67   Resp: 16   SpO2: 98%   Weight: 81.6 kg (180 lb)   Height: 172.7 cm (68\")       Body mass index is 27.37 kg/m².  Discussed the patient's BMI with her. The BMI is in the acceptable range.    Physical Exam          Assessment/Plan   Medicare Risks and Personalized " Health Plan  CMS Preventative Services Quick Reference  Immunizations Discussed/Encouraged (specific immunizations; Influenza )    The above risks/problems have been discussed with the patient.  Pertinent information has been shared with the patient in the After Visit Summary.  Follow up plans and orders are seen below in the Assessment/Plan Section.    Diagnoses and all orders for this visit:    1. Essential hypertension (Primary)    2. Bilateral impacted cerumen      Follow Up:  No follow-ups on file.     An After Visit Summary and PPPS were given to the patient.

## 2020-03-10 ENCOUNTER — TELEPHONE (OUTPATIENT)
Dept: SURGERY | Age: 65
End: 2020-03-10

## 2020-03-10 DIAGNOSIS — Z12.31 VISIT FOR SCREENING MAMMOGRAM: Primary | ICD-10-CM

## 2020-06-18 ENCOUNTER — HOSPITAL ENCOUNTER (OUTPATIENT)
Dept: WOMENS IMAGING | Age: 65
Discharge: HOME | End: 2020-06-18
Payer: MEDICARE

## 2020-06-18 ENCOUNTER — OFFICE VISIT (OUTPATIENT)
Dept: SURGERY | Age: 65
End: 2020-06-18
Payer: MEDICARE

## 2020-06-18 VITALS
HEART RATE: 76 BPM | WEIGHT: 181 LBS | HEIGHT: 68 IN | TEMPERATURE: 98.1 F | BODY MASS INDEX: 27.43 KG/M2 | OXYGEN SATURATION: 98 %

## 2020-06-18 PROCEDURE — G8419 CALC BMI OUT NRM PARAM NOF/U: HCPCS | Performed by: PHYSICIAN ASSISTANT

## 2020-06-18 PROCEDURE — 3017F COLORECTAL CA SCREEN DOC REV: CPT | Performed by: PHYSICIAN ASSISTANT

## 2020-06-18 PROCEDURE — 1036F TOBACCO NON-USER: CPT | Performed by: PHYSICIAN ASSISTANT

## 2020-06-18 PROCEDURE — 1090F PRES/ABSN URINE INCON ASSESS: CPT | Performed by: PHYSICIAN ASSISTANT

## 2020-06-18 PROCEDURE — 1123F ACP DISCUSS/DSCN MKR DOCD: CPT | Performed by: PHYSICIAN ASSISTANT

## 2020-06-18 PROCEDURE — G8428 CUR MEDS NOT DOCUMENT: HCPCS | Performed by: PHYSICIAN ASSISTANT

## 2020-06-18 PROCEDURE — 4040F PNEUMOC VAC/ADMIN/RCVD: CPT | Performed by: PHYSICIAN ASSISTANT

## 2020-06-18 PROCEDURE — 77063 BREAST TOMOSYNTHESIS BI: CPT

## 2020-06-18 PROCEDURE — 99212 OFFICE O/P EST SF 10 MIN: CPT | Performed by: PHYSICIAN ASSISTANT

## 2020-06-18 PROCEDURE — G8400 PT W/DXA NO RESULTS DOC: HCPCS | Performed by: PHYSICIAN ASSISTANT

## 2020-08-21 ENCOUNTER — OFFICE VISIT (OUTPATIENT)
Dept: FAMILY MEDICINE CLINIC | Facility: CLINIC | Age: 65
End: 2020-08-21

## 2020-08-21 VITALS
OXYGEN SATURATION: 99 % | WEIGHT: 174 LBS | BODY MASS INDEX: 26.37 KG/M2 | TEMPERATURE: 97.9 F | HEART RATE: 67 BPM | DIASTOLIC BLOOD PRESSURE: 80 MMHG | SYSTOLIC BLOOD PRESSURE: 124 MMHG | RESPIRATION RATE: 16 BRPM | HEIGHT: 68 IN

## 2020-08-21 DIAGNOSIS — H61.23 BILATERAL IMPACTED CERUMEN: ICD-10-CM

## 2020-08-21 DIAGNOSIS — I10 ESSENTIAL HYPERTENSION: Primary | ICD-10-CM

## 2020-08-21 PROCEDURE — 99213 OFFICE O/P EST LOW 20 MIN: CPT | Performed by: FAMILY MEDICINE

## 2020-08-21 NOTE — PROGRESS NOTES
"Subjective   Karen Tipton is a 65 y.o. female.     Chief Complaint   Patient presents with   • Hypertension   • Ear Problem        History of Present Illness     she thinks bp have been stable without cp ro ha----she thinks her ears are impacted with cerumen due to hearing loss      Current Outpatient Medications:   •  aspirin 81 MG EC tablet, Take 81 mg by mouth., Disp: , Rfl:   •  Cholecalciferol (VITAMIN D PO), Take  by mouth Daily., Disp: , Rfl:   •  lisinopril-hydrochlorothiazide (PRINZIDE,ZESTORETIC) 10-12.5 MG per tablet, TAKE 1 TABLET BY MOUTH ONCE DAILY, Disp: 90 tablet, Rfl: 4  •  Multiple Vitamin (MULTI-VITAMIN DAILY PO), Take  by mouth., Disp: , Rfl:   •  ROYAL JELLY PO, Take  by mouth Daily., Disp: , Rfl:   No Known Allergies    Past Medical History:   Diagnosis Date   • Colon polyp    • Hypertension      Past Surgical History:   Procedure Laterality Date   • BREAST BIOPSY     • COLONOSCOPY  06/10/2014    3 polyps destroyed   • COLONOSCOPY N/A 8/5/2019    Procedure: COLONOSCOPY WITH ANESTHESIA;  Surgeon: Ismael Ramachandran MD;  Location: [facility] ENDOSCOPY;  Service: Gastroenterology       Review of Systems   Constitutional: Negative.    HENT: Positive for hearing loss.    Eyes: Negative.    Respiratory: Negative.    Cardiovascular: Negative.    Gastrointestinal: Negative.    Endocrine: Negative.    Genitourinary: Negative.    Musculoskeletal: Negative.    Skin: Negative.    Allergic/Immunologic: Negative.    Neurological: Negative.    Hematological: Negative.    Psychiatric/Behavioral: Negative.        Objective  /80   Pulse 67   Temp 97.9 °F (36.6 °C)   Resp 16   Ht 172.7 cm (68\")   Wt 78.9 kg (174 lb)   SpO2 99%   BMI 26.46 kg/m²   Physical Exam   Constitutional: She appears well-developed and well-nourished.   HENT:   Head: Normocephalic and atraumatic.   Right Ear: External ear normal.   Left Ear: External ear normal.   Nose: Nose normal.   Mouth/Throat: Oropharynx is clear and moist. "   Eyes: Pupils are equal, round, and reactive to light. Conjunctivae and EOM are normal.   Neck: Normal range of motion. Neck supple.   Cardiovascular: Normal rate, regular rhythm, normal heart sounds and intact distal pulses.   Pulmonary/Chest: Effort normal and breath sounds normal.   Abdominal: Soft. Bowel sounds are normal.   Musculoskeletal: Normal range of motion.   Neurological: She is alert.   Skin: Skin is warm and dry. Capillary refill takes less than 2 seconds.   Psychiatric: She has a normal mood and affect. Her behavior is normal. Judgment normal.   Nursing note and vitals reviewed.  boeth ears are ijpacted with cerumen    Assessment/Plan   Karen was seen today for hypertension and ear problem.    Diagnoses and all orders for this visit:    Essential hypertension  -     CBC w AUTO Differential  -     Comprehensive metabolic panel  -     Lipid Panel With / Chol / HDL Ratio    Bilateral impacted cerumen  -     CBC w AUTO Differential  -     Comprehensive metabolic panel  -     Lipid Panel With / Chol / HDL Ratio      Both ear canals are occluded with cerumen and are irrigated with warm water with success, both tms are visualized without trauma           Orders Placed This Encounter   Procedures   • Comprehensive metabolic panel   • Lipid Panel With / Chol / HDL Ratio   • CBC w AUTO Differential     Order Specific Question:   Manual Differential     Answer:   No       Follow up: 6 month(s)

## 2020-08-22 LAB
ALBUMIN SERPL-MCNC: 4.5 G/DL (ref 3.5–5.2)
ALBUMIN/GLOB SERPL: 2.1 G/DL
ALP SERPL-CCNC: 76 U/L (ref 39–117)
ALT SERPL-CCNC: 17 U/L (ref 1–33)
AST SERPL-CCNC: 17 U/L (ref 1–32)
BASOPHILS # BLD AUTO: 0.07 10*3/MM3 (ref 0–0.2)
BASOPHILS NFR BLD AUTO: 1.1 % (ref 0–1.5)
BILIRUB SERPL-MCNC: 0.5 MG/DL (ref 0–1.2)
BUN SERPL-MCNC: 12 MG/DL (ref 8–23)
BUN/CREAT SERPL: 15.8 (ref 7–25)
CALCIUM SERPL-MCNC: 9.6 MG/DL (ref 8.6–10.5)
CHLORIDE SERPL-SCNC: 104 MMOL/L (ref 98–107)
CHOLEST SERPL-MCNC: 159 MG/DL (ref 0–200)
CHOLEST/HDLC SERPL: 3.53 {RATIO}
CO2 SERPL-SCNC: 24.7 MMOL/L (ref 22–29)
CREAT SERPL-MCNC: 0.76 MG/DL (ref 0.57–1)
EOSINOPHIL # BLD AUTO: 0.27 10*3/MM3 (ref 0–0.4)
EOSINOPHIL NFR BLD AUTO: 4.4 % (ref 0.3–6.2)
ERYTHROCYTE [DISTWIDTH] IN BLOOD BY AUTOMATED COUNT: 14.2 % (ref 12.3–15.4)
GLOBULIN SER CALC-MCNC: 2.1 GM/DL
GLUCOSE SERPL-MCNC: 104 MG/DL (ref 65–99)
HCT VFR BLD AUTO: 40.6 % (ref 34–46.6)
HDLC SERPL-MCNC: 45 MG/DL (ref 40–60)
HGB BLD-MCNC: 12.8 G/DL (ref 12–15.9)
IMM GRANULOCYTES # BLD AUTO: 0.01 10*3/MM3 (ref 0–0.05)
IMM GRANULOCYTES NFR BLD AUTO: 0.2 % (ref 0–0.5)
LDLC SERPL CALC-MCNC: 99 MG/DL (ref 0–100)
LYMPHOCYTES # BLD AUTO: 2.32 10*3/MM3 (ref 0.7–3.1)
LYMPHOCYTES NFR BLD AUTO: 38 % (ref 19.6–45.3)
MCH RBC QN AUTO: 24.4 PG (ref 26.6–33)
MCHC RBC AUTO-ENTMCNC: 31.5 G/DL (ref 31.5–35.7)
MCV RBC AUTO: 77.3 FL (ref 79–97)
MONOCYTES # BLD AUTO: 0.41 10*3/MM3 (ref 0.1–0.9)
MONOCYTES NFR BLD AUTO: 6.7 % (ref 5–12)
NEUTROPHILS # BLD AUTO: 3.02 10*3/MM3 (ref 1.7–7)
NEUTROPHILS NFR BLD AUTO: 49.6 % (ref 42.7–76)
NRBC BLD AUTO-RTO: 0 /100 WBC (ref 0–0.2)
PLATELET # BLD AUTO: 273 10*3/MM3 (ref 140–450)
POTASSIUM SERPL-SCNC: 4.1 MMOL/L (ref 3.5–5.2)
PROT SERPL-MCNC: 6.6 G/DL (ref 6–8.5)
RBC # BLD AUTO: 5.25 10*6/MM3 (ref 3.77–5.28)
SODIUM SERPL-SCNC: 142 MMOL/L (ref 136–145)
TRIGL SERPL-MCNC: 74 MG/DL (ref 0–150)
VLDLC SERPL CALC-MCNC: 14.8 MG/DL (ref 5–40)
WBC # BLD AUTO: 6.1 10*3/MM3 (ref 3.4–10.8)

## 2020-09-14 RX ORDER — LISINOPRIL AND HYDROCHLOROTHIAZIDE 12.5; 1 MG/1; MG/1
TABLET ORAL
Qty: 90 TABLET | Refills: 0 | Status: SHIPPED | OUTPATIENT
Start: 2020-09-14 | End: 2020-12-10

## 2020-12-04 ENCOUNTER — LAB (OUTPATIENT)
Dept: LAB | Facility: HOSPITAL | Age: 65
End: 2020-12-04

## 2020-12-04 ENCOUNTER — TELEPHONE (OUTPATIENT)
Dept: FAMILY MEDICINE CLINIC | Facility: CLINIC | Age: 65
End: 2020-12-04

## 2020-12-04 DIAGNOSIS — R30.0 DYSURIA: ICD-10-CM

## 2020-12-04 DIAGNOSIS — R30.0 DYSURIA: Primary | ICD-10-CM

## 2020-12-04 DIAGNOSIS — Z20.822 EXPOSURE TO COVID-19 VIRUS: ICD-10-CM

## 2020-12-04 LAB
BACTERIA UR QL AUTO: ABNORMAL /HPF
BILIRUB UR QL STRIP: NEGATIVE
CLARITY UR: ABNORMAL
COLOR UR: ABNORMAL
GLUCOSE UR STRIP-MCNC: NEGATIVE MG/DL
HGB UR QL STRIP.AUTO: ABNORMAL
HYALINE CASTS UR QL AUTO: ABNORMAL /LPF
KETONES UR QL STRIP: NEGATIVE
LEUKOCYTE ESTERASE UR QL STRIP.AUTO: ABNORMAL
NITRITE UR QL STRIP: POSITIVE
PH UR STRIP.AUTO: 6.5 [PH] (ref 5–8)
PROT UR QL STRIP: ABNORMAL
RBC # UR: ABNORMAL /HPF
REF LAB TEST METHOD: ABNORMAL
SP GR UR STRIP: 1.01 (ref 1–1.03)
SQUAMOUS #/AREA URNS HPF: ABNORMAL /HPF
UROBILINOGEN UR QL STRIP: ABNORMAL
WBC UR QL AUTO: ABNORMAL /HPF

## 2020-12-04 PROCEDURE — 81001 URINALYSIS AUTO W/SCOPE: CPT

## 2020-12-04 PROCEDURE — 87186 SC STD MICRODIL/AGAR DIL: CPT

## 2020-12-04 PROCEDURE — U0003 INFECTIOUS AGENT DETECTION BY NUCLEIC ACID (DNA OR RNA); SEVERE ACUTE RESPIRATORY SYNDROME CORONAVIRUS 2 (SARS-COV-2) (CORONAVIRUS DISEASE [COVID-19]), AMPLIFIED PROBE TECHNIQUE, MAKING USE OF HIGH THROUGHPUT TECHNOLOGIES AS DESCRIBED BY CMS-2020-01-R: HCPCS

## 2020-12-04 PROCEDURE — 87086 URINE CULTURE/COLONY COUNT: CPT

## 2020-12-04 PROCEDURE — C9803 HOPD COVID-19 SPEC COLLECT: HCPCS

## 2020-12-04 RX ORDER — CIPROFLOXACIN 500 MG/1
500 TABLET, FILM COATED ORAL EVERY 12 HOURS SCHEDULED
Qty: 14 TABLET | Refills: 0 | Status: SHIPPED | OUTPATIENT
Start: 2020-12-04 | End: 2020-12-11

## 2020-12-04 NOTE — TELEPHONE ENCOUNTER
PT CALLED REGARDING UTI SYMPTOMS INCLUDING URINARY PAIN, PT HAS BEEN TAKING AZO TO RELIEVE SYMPTOMS BUT WOULD LIKE AN ANTIBIOTIC IF POSSIBLE    PT HAS POTENTIALLY BEEN EXPOSED TO COVID-19 AND CAN'T COME IN TO OFFICE, WANTS TO KNOW IF DR. 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

## 2020-12-04 NOTE — TELEPHONE ENCOUNTER
PATIENT CALLED IN TO LET IZAIAH KNOW THAT SHE HAS TAKING THE COVID TEST AND ALSO HAD A URINE TEST.

## 2020-12-06 LAB
BACTERIA SPEC AEROBE CULT: ABNORMAL
SARS-COV-2 RNA RESP QL NAA+PROBE: NOT DETECTED

## 2020-12-10 RX ORDER — LISINOPRIL AND HYDROCHLOROTHIAZIDE 12.5; 1 MG/1; MG/1
TABLET ORAL
Qty: 90 TABLET | Refills: 0 | Status: SHIPPED | OUTPATIENT
Start: 2020-12-10 | End: 2021-03-15

## 2020-12-31 ENCOUNTER — IMMUNIZATION (OUTPATIENT)
Dept: VACCINE CLINIC | Facility: HOSPITAL | Age: 65
End: 2020-12-31

## 2020-12-31 PROCEDURE — 0011A: CPT | Performed by: OBSTETRICS & GYNECOLOGY

## 2020-12-31 PROCEDURE — 91301 HC SARSCO02 VAC 100MCG/0.5ML IM: CPT | Performed by: OBSTETRICS & GYNECOLOGY

## 2021-01-22 ENCOUNTER — OFFICE VISIT (OUTPATIENT)
Dept: FAMILY MEDICINE CLINIC | Facility: CLINIC | Age: 66
End: 2021-01-22

## 2021-01-22 VITALS
DIASTOLIC BLOOD PRESSURE: 78 MMHG | WEIGHT: 177 LBS | BODY MASS INDEX: 26.83 KG/M2 | HEIGHT: 68 IN | HEART RATE: 68 BPM | RESPIRATION RATE: 16 BRPM | TEMPERATURE: 96.9 F | OXYGEN SATURATION: 98 % | SYSTOLIC BLOOD PRESSURE: 122 MMHG

## 2021-01-22 DIAGNOSIS — H61.23 BILATERAL IMPACTED CERUMEN: Primary | ICD-10-CM

## 2021-01-22 DIAGNOSIS — I10 ESSENTIAL HYPERTENSION: ICD-10-CM

## 2021-01-22 PROCEDURE — 99213 OFFICE O/P EST LOW 20 MIN: CPT | Performed by: FAMILY MEDICINE

## 2021-01-22 NOTE — PROGRESS NOTES
"Subjective   Karen Tipton is a 65 y.o. female.     Chief Complaint   Patient presents with   • Tonsil stones   • Ear Problem     Left fullness       History of Present Illness     she notes dimished hearing in both ears ---also her bp has been stable withoput cp or ha      Current Outpatient Medications:   •  aspirin 81 MG EC tablet, Take 81 mg by mouth., Disp: , Rfl:   •  Cholecalciferol (VITAMIN D PO), Take  by mouth Daily., Disp: , Rfl:   •  lisinopril-hydrochlorothiazide (PRINZIDE,ZESTORETIC) 10-12.5 MG per tablet, Take 1 tablet by mouth once daily, Disp: 90 tablet, Rfl: 0  •  Multiple Vitamin (MULTI-VITAMIN DAILY PO), Take  by mouth., Disp: , Rfl:   •  ROYAL JELLY PO, Take  by mouth Daily., Disp: , Rfl:   •  carbamide peroxide (Debrox) 6.5 % otic solution, Administer 5 drops into both ears 2 (Two) Times a Day., Disp: 15 mL, Rfl: 1  No Known Allergies    Past Medical History:   Diagnosis Date   • Colon polyp    • Hypertension      Past Surgical History:   Procedure Laterality Date   • BREAST BIOPSY     • COLONOSCOPY  06/10/2014    3 polyps destroyed   • COLONOSCOPY N/A 8/5/2019    Procedure: COLONOSCOPY WITH ANESTHESIA;  Surgeon: Ismael Ramachandran MD;  Location: [facility] ENDOSCOPY;  Service: Gastroenterology       Review of Systems   Constitutional: Negative.    HENT: Positive for ear pain and hearing loss.    Eyes: Negative.    Respiratory: Negative.    Cardiovascular: Negative.    Gastrointestinal: Negative.    Endocrine: Negative.    Genitourinary: Negative.    Musculoskeletal: Negative.    Skin: Negative.    Allergic/Immunologic: Negative.    Neurological: Negative.    Hematological: Negative.    Psychiatric/Behavioral: Negative.        Objective  /78   Pulse 68   Temp 96.9 °F (36.1 °C)   Resp 16   Ht 172.7 cm (68\")   Wt 80.3 kg (177 lb)   SpO2 98%   BMI 26.91 kg/m²   Physical Exam  Vitals signs and nursing note reviewed.   Constitutional:       Appearance: Normal appearance.   HENT:      Head: " Normocephalic and atraumatic.      Right Ear: There is impacted cerumen.      Left Ear: There is impacted cerumen.      Nose: Nose normal.      Mouth/Throat:      Mouth: Mucous membranes are moist.      Pharynx: Oropharynx is clear.   Eyes:      Conjunctiva/sclera: Conjunctivae normal.      Pupils: Pupils are equal, round, and reactive to light.   Neck:      Musculoskeletal: Normal range of motion and neck supple.   Cardiovascular:      Rate and Rhythm: Normal rate and regular rhythm.      Pulses: Normal pulses.   Pulmonary:      Effort: Pulmonary effort is normal.      Breath sounds: Normal breath sounds.   Abdominal:      General: Abdomen is flat. Bowel sounds are normal.      Palpations: Abdomen is soft.   Genitourinary:     General: Normal vulva.   Musculoskeletal: Normal range of motion.   Skin:     General: Skin is warm.      Capillary Refill: Capillary refill takes less than 2 seconds.   Neurological:      General: No focal deficit present.      Mental Status: She is alert and oriented to person, place, and time. Mental status is at baseline.   Psychiatric:         Mood and Affect: Mood normal.         Behavior: Behavior normal.         Thought Content: Thought content normal.         Judgment: Judgment normal.     both ear canals occluded with cerumen    Assessment/Plan   Diagnoses and all orders for this visit:    1. Bilateral impacted cerumen (Primary)    2. Essential hypertension    Other orders  -     carbamide peroxide (Debrox) 6.5 % otic solution; Administer 5 drops into both ears 2 (Two) Times a Day.  Dispense: 15 mL; Refill: 1                 No orders of the defined types were placed in this encounter.      Follow up: 2 week(s) for ear irrigation

## 2021-01-28 ENCOUNTER — APPOINTMENT (OUTPATIENT)
Dept: VACCINE CLINIC | Facility: HOSPITAL | Age: 66
End: 2021-01-28

## 2021-01-30 ENCOUNTER — IMMUNIZATION (OUTPATIENT)
Dept: VACCINE CLINIC | Facility: HOSPITAL | Age: 66
End: 2021-01-30

## 2021-01-30 PROCEDURE — 91301 HC SARSCO02 VAC 100MCG/0.5ML IM: CPT | Performed by: OBSTETRICS & GYNECOLOGY

## 2021-01-30 PROCEDURE — 0012A: CPT | Performed by: OBSTETRICS & GYNECOLOGY

## 2021-02-05 ENCOUNTER — CLINICAL SUPPORT (OUTPATIENT)
Dept: FAMILY MEDICINE CLINIC | Facility: CLINIC | Age: 66
End: 2021-02-05

## 2021-02-05 DIAGNOSIS — H61.23 BILATERAL IMPACTED CERUMEN: Primary | ICD-10-CM

## 2021-02-05 PROCEDURE — 69209 REMOVE IMPACTED EAR WAX UNI: CPT | Performed by: FAMILY MEDICINE

## 2021-02-05 NOTE — PROGRESS NOTES
Pt got a ear wash bilateral.wax was removed from both ears and dr matias looked at ear after wash and tm was all clear

## 2021-02-19 ENCOUNTER — HOSPITAL ENCOUNTER (EMERGENCY)
Facility: HOSPITAL | Age: 66
Discharge: HOME | End: 2021-02-19
Attending: EMERGENCY MEDICINE | Admitting: EMERGENCY MEDICINE

## 2021-02-19 VITALS
SYSTOLIC BLOOD PRESSURE: 136 MMHG | HEIGHT: 68 IN | BODY MASS INDEX: 26.67 KG/M2 | WEIGHT: 176 LBS | TEMPERATURE: 98 F | HEART RATE: 66 BPM | RESPIRATION RATE: 16 BRPM | OXYGEN SATURATION: 97 % | DIASTOLIC BLOOD PRESSURE: 76 MMHG

## 2021-02-19 DIAGNOSIS — H10.9 CONJUNCTIVITIS OF LEFT EYE, UNSPECIFIED CONJUNCTIVITIS TYPE: Primary | ICD-10-CM

## 2021-02-19 PROCEDURE — 99282 EMERGENCY DEPT VISIT SF MDM: CPT

## 2021-02-19 RX ORDER — GENTAMICIN SULFATE 3 MG/ML
1 SOLUTION/ DROPS OPHTHALMIC EVERY 4 HOURS
Qty: 5 ML | Refills: 0 | Status: SHIPPED | OUTPATIENT
Start: 2021-02-19 | End: 2023-02-07

## 2021-02-19 NOTE — ED PROVIDER NOTES
Subjective   66-year-old female presents to the ED with complaint of left eye redness, swelling, discharge.  Onset of symptoms several days ago after she applied some moisturizing cream to her left upper eyelid due to dryness.  Patient woke up this morning with left eye crusting and more irritation so she come to the ER for evaluation.  No vision changes.  Does use contacts.  Symptoms moderate severity no aggravating relieving factors.      History provided by:  Patient      Review of Systems   All other systems reviewed and are negative.      Past Medical History:   Diagnosis Date   • Colon polyp    • Hypertension        No Known Allergies    Past Surgical History:   Procedure Laterality Date   • BREAST BIOPSY     • COLONOSCOPY  06/10/2014    3 polyps destroyed   • COLONOSCOPY N/A 8/5/2019    Procedure: COLONOSCOPY WITH ANESTHESIA;  Surgeon: Ismael Ramachandran MD;  [facility] ENDOSCOPY;  Service: Gastroenterology       Family History   Problem Relation Age of Onset   • Stroke Mother    • Dementia Mother    • Cancer Father         protate   • Colon cancer Paternal Grandfather        Social History     Socioeconomic History   • Marital status:      Spouse name: Not on file   • Number of children: Not on file   • Years of education: Not on file   • Highest education level: Not on file   Tobacco Use   • Smoking status: Never Smoker   • Smokeless tobacco: Never Used   Substance and Sexual Activity   • Alcohol use: Yes     Comment: rare   • Drug use: No   • Sexual activity: Defer           Objective   Physical Exam  Vitals signs and nursing note reviewed.   Constitutional:       Appearance: Normal appearance. She is normal weight.   HENT:      Head: Normocephalic and atraumatic.      Nose: Nose normal. No congestion or rhinorrhea.      Mouth/Throat:      Mouth: Mucous membranes are moist.      Pharynx: Oropharynx is clear.   Eyes:      General:         Left eye: Discharge present.     Extraocular Movements:  Extraocular movements intact.      Pupils: Pupils are equal, round, and reactive to light.      Comments: Left eye conjunctival injection   Cardiovascular:      Rate and Rhythm: Normal rate and regular rhythm.      Heart sounds: No murmur. No friction rub. No gallop.    Pulmonary:      Effort: Pulmonary effort is normal.      Breath sounds: Normal breath sounds. No wheezing, rhonchi or rales.   Abdominal:      General: Abdomen is flat. Bowel sounds are normal.      Palpations: Abdomen is soft.   Musculoskeletal:         General: No swelling, tenderness or deformity.   Skin:     General: Skin is warm and dry.      Capillary Refill: Capillary refill takes less than 2 seconds.   Neurological:      General: No focal deficit present.      Mental Status: She is alert and oriented to person, place, and time. Mental status is at baseline.         Procedures           ED Course  ED Course as of Feb 19 0303 Fri Feb 19, 2021   0259 Conjunctivitis left eye, having some mild crusting now.  Will discharge with prescription for antibiotic drops, have patient follow-up with ophthalmology if no improvement.    [AW]      ED Course User Index  [AW] Zeus Pisano MD                                           MDM  Number of Diagnoses or Management Options  Conjunctivitis of left eye, unspecified conjunctivitis type: new and does not require workup  Risk of Complications, Morbidity, and/or Mortality  Presenting problems: low  Diagnostic procedures: low  Management options: low    Patient Progress  Patient progress: stable      Final diagnoses:   Conjunctivitis of left eye, unspecified conjunctivitis type            Zeus Pisano MD  02/19/21 0303

## 2021-03-15 RX ORDER — LISINOPRIL AND HYDROCHLOROTHIAZIDE 12.5; 1 MG/1; MG/1
TABLET ORAL
Qty: 90 TABLET | Refills: 1 | Status: SHIPPED | OUTPATIENT
Start: 2021-03-15 | End: 2021-09-09

## 2021-03-15 NOTE — TELEPHONE ENCOUNTER
Requested Prescriptions     Pending Prescriptions Disp Refills   • lisinopril-hydrochlorothiazide (PRINZIDE,ZESTORETIC) 10-12.5 MG per tablet [Pharmacy Med Name: Lisinopril-hydroCHLOROthiazide 10-12.5 MG Oral Tablet] 90 tablet 1     Sig: Take 1 tablet by mouth once daily

## 2021-06-02 ENCOUNTER — TELEPHONE (OUTPATIENT)
Dept: SURGERY | Age: 66
End: 2021-06-02

## 2021-08-09 ENCOUNTER — HOSPITAL ENCOUNTER (OUTPATIENT)
Dept: WOMENS IMAGING | Age: 66
Discharge: HOME | End: 2021-08-09
Payer: MEDICARE

## 2021-08-09 ENCOUNTER — OFFICE VISIT (OUTPATIENT)
Dept: SURGERY | Age: 66
End: 2021-08-09
Payer: MEDICARE

## 2021-08-09 DIAGNOSIS — Z12.31 BREAST CANCER SCREENING BY MAMMOGRAM: ICD-10-CM

## 2021-08-09 DIAGNOSIS — Z12.31 VISIT FOR SCREENING MAMMOGRAM: Primary | ICD-10-CM

## 2021-08-09 PROCEDURE — G8428 CUR MEDS NOT DOCUMENT: HCPCS | Performed by: PHYSICIAN ASSISTANT

## 2021-08-09 PROCEDURE — 99213 OFFICE O/P EST LOW 20 MIN: CPT | Performed by: PHYSICIAN ASSISTANT

## 2021-08-09 PROCEDURE — 1123F ACP DISCUSS/DSCN MKR DOCD: CPT | Performed by: PHYSICIAN ASSISTANT

## 2021-08-09 PROCEDURE — 77067 SCR MAMMO BI INCL CAD: CPT

## 2021-08-09 PROCEDURE — 1036F TOBACCO NON-USER: CPT | Performed by: PHYSICIAN ASSISTANT

## 2021-08-09 PROCEDURE — 1090F PRES/ABSN URINE INCON ASSESS: CPT | Performed by: PHYSICIAN ASSISTANT

## 2021-08-09 PROCEDURE — G8421 BMI NOT CALCULATED: HCPCS | Performed by: PHYSICIAN ASSISTANT

## 2021-08-09 PROCEDURE — G8400 PT W/DXA NO RESULTS DOC: HCPCS | Performed by: PHYSICIAN ASSISTANT

## 2021-08-09 PROCEDURE — 3017F COLORECTAL CA SCREEN DOC REV: CPT | Performed by: PHYSICIAN ASSISTANT

## 2021-08-09 PROCEDURE — 4040F PNEUMOC VAC/ADMIN/RCVD: CPT | Performed by: PHYSICIAN ASSISTANT

## 2021-09-09 RX ORDER — LISINOPRIL AND HYDROCHLOROTHIAZIDE 12.5; 1 MG/1; MG/1
TABLET ORAL
Qty: 90 TABLET | Refills: 0 | Status: SHIPPED | OUTPATIENT
Start: 2021-09-09 | End: 2021-12-13

## 2021-09-23 ENCOUNTER — TELEPHONE (OUTPATIENT)
Dept: FAMILY MEDICINE CLINIC | Facility: CLINIC | Age: 66
End: 2021-09-23

## 2021-09-23 NOTE — TELEPHONE ENCOUNTER
Hub to Read- Called and left message for patient. Patient is due for Medicare Wellness Visit, call was to offer scheduling of Medicare Wellness Visit if interested. If patient is interested, Hub can schedule.

## 2021-11-19 ENCOUNTER — IMMUNIZATION (OUTPATIENT)
Dept: VACCINE CLINIC | Facility: HOSPITAL | Age: 66
End: 2021-11-19

## 2021-11-19 DIAGNOSIS — Z23 NEED FOR VACCINATION: Primary | ICD-10-CM

## 2021-11-19 PROCEDURE — 91306 HC SARSCOV2 VAC 50MCG/0.25ML IM: CPT | Performed by: OBSTETRICS & GYNECOLOGY

## 2021-11-19 PROCEDURE — 0064A HC ADM SARSCOV2 50MCG/0.25ML BOOSTER: CPT | Performed by: OBSTETRICS & GYNECOLOGY

## 2021-12-13 RX ORDER — LISINOPRIL AND HYDROCHLOROTHIAZIDE 12.5; 1 MG/1; MG/1
TABLET ORAL
Qty: 90 TABLET | Refills: 0 | Status: SHIPPED | OUTPATIENT
Start: 2021-12-13 | End: 2022-03-15

## 2022-03-15 RX ORDER — LISINOPRIL AND HYDROCHLOROTHIAZIDE 12.5; 1 MG/1; MG/1
1 TABLET ORAL DAILY
Qty: 30 TABLET | Refills: 0 | Status: SHIPPED | OUTPATIENT
Start: 2022-03-15 | End: 2022-04-14

## 2022-03-15 NOTE — TELEPHONE ENCOUNTER
Rx Refill Note  Requested Prescriptions     Pending Prescriptions Disp Refills   • lisinopril-hydrochlorothiazide (PRINZIDE,ZESTORETIC) 10-12.5 MG per tablet [Pharmacy Med Name: Lisinopril-hydroCHLOROthiazide 10-12.5 MG Oral Tablet] 30 tablet 0     Sig: Take 1 tablet by mouth Daily. Please call office for appointment.      Last office visit with prescribing clinician: 1/22/2021      Next office visit with prescribing clinician: Visit date not found            Essie Garcia MA  03/15/22, 08:26 CDT

## 2022-04-14 RX ORDER — LISINOPRIL AND HYDROCHLOROTHIAZIDE 12.5; 1 MG/1; MG/1
TABLET ORAL
Qty: 90 TABLET | Refills: 0 | Status: SHIPPED | OUTPATIENT
Start: 2022-04-14 | End: 2022-07-13

## 2022-07-13 RX ORDER — LISINOPRIL AND HYDROCHLOROTHIAZIDE 12.5; 1 MG/1; MG/1
TABLET ORAL
Qty: 90 TABLET | Refills: 0 | Status: SHIPPED | OUTPATIENT
Start: 2022-07-13 | End: 2022-10-17

## 2022-08-09 NOTE — PROGRESS NOTES
HPI:  Mavis Castellano is in for yearly follow-up breast check. She has not noticed any changes in her breasts. Her imaging was reviewed and is noted below. BRAUILO DIGITAL SCREEN W OR WO CAD BILATERAL 8/9/2021 9:12 AM   HISTORY: Z12.31     COMPARISON STUDIES: Exams dated 6/18/2020, 5/16/2019, 1/14/2019,   1/4/2019, 5/22/2018, 5/17/2017. FINDINGS:    2-D and 3-D digital mammography of bilateral breasts was obtained in   the CC and MLO projection. The breast tissue is heterogeneously dense (approximately 50-75%   glandular tissue), limiting the sensitivity of mammography. Bilateral   mole markers and bilateral biopsy clips. There are a few scattered   benign calcifications. No suspicious masses, architectural distortion   or new suspicious microcalcifications are identified. There has been   no significant interval change. This study was interpreted with CAD. IMPRESSION AND RECOMMENDATION:    No mammographic evidence of malignancy. Recommendation is for the   patient to return for routine mammography in one year or sooner, if   clinically indicated. BI-RADS CATEGORY 2: BENIGN FINDINGS        BREAST EXAM:  On examination, she has fibrocystic changes throughout both breasts, no dominant masses, no skin or nipple changes and no axillary adenopathy. I see nothing suspicious for breast cancer. ASSESSMENT:  Benign fibrocystic changes              PLAN:  I will plan to see her back in 1 year for physical exam and mammograms. She will contact me if anything significant changes. 20 minutes spent, which includes face to face with patient, record review, evaluation, planning, and education. No

## 2022-10-17 RX ORDER — LISINOPRIL AND HYDROCHLOROTHIAZIDE 12.5; 1 MG/1; MG/1
TABLET ORAL
Qty: 90 TABLET | Refills: 0 | Status: SHIPPED | OUTPATIENT
Start: 2022-10-17 | End: 2023-01-17

## 2022-10-26 ENCOUNTER — TELEPHONE (OUTPATIENT)
Dept: SURGERY | Age: 67
End: 2022-10-26

## 2022-10-26 NOTE — TELEPHONE ENCOUNTER
Patient was no showed:      Appointment Information   Name: Bere Carias MRN: [id number]   Date: 2022 Status: No Show   Time: 2:30 PM Length: 15   Visit Type: FOLLOW UP [1002] Copay: $0.00   Provider: Clemente Parra PA-C Department: [facility] GENERAL SURGERY   Referral #:   Referral Status:     Referring Provider:   Patient Type:         Auto Confirm Status: ANS MACH   Notes: Breast exam following BRAULIO   Disposition Notes: Made On:  EOD List:  EOD Status: 10/27/2021 10:29 AM  2022 3:16 PM  2022 11:03 PM By:  By:  By: HAMMAD Barone   STAR Account:    CSN:  [id number]   Call Information   Contact: Bere Carias Relation to patient:     Call type: Outgoing Call date/time: 22 1:08 PM   Outcome:   Comment:     Phone number:   Phone type:     Context: Appointment Automatic Notices User: [id number], [id number]      Appointment Instructions   Visit Type: FOLLOW UP     FOLLOW UP  Please arrive 15 minutes prior to appointment time, bring insurance card and photo ID. Dept directions for [facility] GENERAL SURGERY:  [facility], [address]nd Floor, [address] [place] [address]      Patient Demographics for Rick Anna [[id number]]   : 1955 SSN:    Age: 75 yrs Sex: Female   Home Phone: [phone] Work Phone: [phone]   Address: [place] [address] [place]: [email or web address]   City/State/Zip: [place], [place] [address]   Anita Daly Comments:     Appointment Notification Information   Person Notified Reason Notified Time Notified      Patient states that front office did not reach out to her to reschedule. I will get it rescheduled as soon as possible and make sure I reach her with new appt's.

## 2022-10-26 NOTE — TELEPHONE ENCOUNTER
Pt called to reschedule mammogram and FU that she missed in AUG. [facility] unable to accommodate  Patient would like it in January on a Tuesday, anytime during the day. She said to call mobile number, if she does not answer leave the appt information on her VM> thank You!

## 2022-12-08 ENCOUNTER — HOSPITAL ENCOUNTER (OUTPATIENT)
Dept: WOMENS IMAGING | Age: 67
Discharge: HOME | End: 2022-12-08
Payer: MEDICARE

## 2022-12-08 ENCOUNTER — OFFICE VISIT (OUTPATIENT)
Dept: SURGERY | Age: 67
End: 2022-12-08
Payer: MEDICARE

## 2022-12-08 VITALS
BODY MASS INDEX: 26.75 KG/M2 | TEMPERATURE: 97.8 F | OXYGEN SATURATION: 100 % | HEART RATE: 86 BPM | WEIGHT: 176.5 LBS | DIASTOLIC BLOOD PRESSURE: 81 MMHG | SYSTOLIC BLOOD PRESSURE: 160 MMHG | HEIGHT: 68 IN

## 2022-12-08 VITALS — WEIGHT: 175 LBS | BODY MASS INDEX: 26.61 KG/M2

## 2022-12-08 DIAGNOSIS — R92.8 ABNORMAL MAMMOGRAM: Primary | ICD-10-CM

## 2022-12-08 DIAGNOSIS — N63.15 MASS OVERLAPPING MULTIPLE QUADRANTS OF RIGHT BREAST: ICD-10-CM

## 2022-12-08 DIAGNOSIS — N63.10 MASS OF RIGHT BREAST, UNSPECIFIED QUADRANT: ICD-10-CM

## 2022-12-08 DIAGNOSIS — N63.11 LUMP IN UPPER OUTER QUADRANT OF RIGHT BREAST: ICD-10-CM

## 2022-12-08 DIAGNOSIS — Z12.31 BREAST CANCER SCREENING BY MAMMOGRAM: ICD-10-CM

## 2022-12-08 PROCEDURE — 1123F ACP DISCUSS/DSCN MKR DOCD: CPT | Performed by: PHYSICIAN ASSISTANT

## 2022-12-08 PROCEDURE — G8484 FLU IMMUNIZE NO ADMIN: HCPCS | Performed by: PHYSICIAN ASSISTANT

## 2022-12-08 PROCEDURE — 99213 OFFICE O/P EST LOW 20 MIN: CPT | Performed by: PHYSICIAN ASSISTANT

## 2022-12-08 PROCEDURE — 3017F COLORECTAL CA SCREEN DOC REV: CPT | Performed by: PHYSICIAN ASSISTANT

## 2022-12-08 PROCEDURE — G8400 PT W/DXA NO RESULTS DOC: HCPCS | Performed by: PHYSICIAN ASSISTANT

## 2022-12-08 PROCEDURE — G8417 CALC BMI ABV UP PARAM F/U: HCPCS | Performed by: PHYSICIAN ASSISTANT

## 2022-12-08 PROCEDURE — 77066 DX MAMMO INCL CAD BI: CPT

## 2022-12-08 PROCEDURE — 76642 ULTRASOUND BREAST LIMITED: CPT

## 2022-12-08 PROCEDURE — G8428 CUR MEDS NOT DOCUMENT: HCPCS | Performed by: PHYSICIAN ASSISTANT

## 2022-12-08 PROCEDURE — 1036F TOBACCO NON-USER: CPT | Performed by: PHYSICIAN ASSISTANT

## 2022-12-08 PROCEDURE — 1090F PRES/ABSN URINE INCON ASSESS: CPT | Performed by: PHYSICIAN ASSISTANT

## 2022-12-08 NOTE — PROGRESS NOTES
HPI:  Fabiola Neil is in for follow-up breast check. She has noticed a right breast mass. Her imaging was reviewed and is noted below. 12/8/2022 5:04 PM   BRAULIO DIGITAL DIAGNOSTIC W OR WO CAD BILATERAL, US BREAST LIMITED RIGHT   EXAM REASON: Patient is a 54-year-old female with a palpable area of   concern in the right breast   BILATERAL DIAGNOSTIC MAMMOGRAPHY:  Today's examination consists of   2-D/3-D combo standard craniocaudal and oblique views of both breasts   and right breast spot compression views. Comparison is made  prior   breast imaging since May 16, 2016 . Breast parenchyma is   heterogeneously dense . The palpable area of concern is located in the   right breast around 12:00 which corresponds to a round microlobulated   high density mass measuring 23 x 22 x 23 mm in length by width by   height. Just anterior is an additional second similar appearing mass   measuring approximately 15 x 13 x 9 mm in length by width by height. Additionally there is a third partially visualized mass in the extreme   far posterior lower inner quadrant measuring approximately 13 mm. No   additional suspicious finding is seen. A biopsy clip is noted in the   left breast along with a stable postsurgical scar. The mammograms were   evaluated using computer aided detection. Next, right breast   ultrasound was performed. RIGHT BREAST ULTRASOUND FINDINGS:  Real-time ultrasound performed by   technologist, with ultrasound images presented for radiologist   interpretation. Additionally I scanned the patient myself. Palpable   area of concern is located at 1:00 5 cm from the nipple. Ultrasound at   this location demonstrates a heterogeneously hypoechoic round   noncircumscribed mass with microlobulated and angular margins   measuring 20 x 20 x 23 mm. There is an additional similar appearing   mass questionably with direct extension from this main palpable mass   versus a few small millimeters of intervening tissue. This second   additional mass measures 16 x 2 x 11 mm. The additional third mass is   located at 4:00 11 cm from the nipple, also in similar morphological   characteristics measuring 13 x 9 x 11 mm. Imaging of the right axilla   demonstrates at least 2 sonographically abnormal lymph nodes. One   measures 16 mm in long axis with favored cortical thickening up to   approximately 4 mm. An additional abnormal lymph node demonstrates a   residual hilum but with eccentric cortical thickening. This lymph node   measures up to 12 mm in long axis. Impression   Impression:   1. Right breast, BI-RADS 5, palpable mass is highly suggestive of a   breast malignancy. Recommendation is for percutaneous core needle   biopsy. 2.  Immediately adjacent second satellite mass is also highly   suggestive of a breast malignancy. Recommendation is for appropriate   action. 3.  Additional third mass in a separate quadrant at 4:00 is highly   suggestive of a breast malignancy. Recommendation is for percutaneous   core needle biopsy. 4.  At least 2 abnormal axillary lymph nodes are suspicious for   metastatic involvement. Recommendation is for fine-needle aspiration   versus nonvacuum percutaneous biopsy per performing physician   preference. BREAST EXAM:  On examination, she has fibrocystic changes throughout both breasts, no skin or nipple changes and no axillary adenopathy. There is a palpable mass at 1:00 right breast.    ASSESSMENT:     Diagnosis Orders   1. Abnormal mammogram  US BIOPSY LYMPH NODE    US BREAST BIOPSY W LOC DEVICE 1ST LESION RIGHT    US BREAST BIOPSY W LOC DEVICE EACH ADDL LESION RIGHT      2. Mass overlapping multiple quadrants of right breast  US BREAST BIOPSY W LOC DEVICE 1ST LESION RIGHT    US BREAST BIOPSY W LOC DEVICE EACH ADDL LESION RIGHT          PLAN:  This will require US guided mammotome biopsy, which will be done under local anesthesia.   Further procedures will be done as indicated. CONSENT:  The risks, benefits and options of biopsy/US were discussed with her including but not limited to bleeding, infection, hematoma, missing the lesion, and scarring. She expresses good understanding and is agreeable to proceed.

## 2022-12-19 ENCOUNTER — HOSPITAL ENCOUNTER (OUTPATIENT)
Dept: WOMENS IMAGING | Age: 67
Discharge: HOME | End: 2022-12-19
Payer: MEDICARE

## 2022-12-19 ENCOUNTER — PROCEDURE VISIT (OUTPATIENT)
Dept: SURGERY | Age: 67
End: 2022-12-19
Payer: MEDICARE

## 2022-12-19 DIAGNOSIS — R92.8 ABNORMAL MAMMOGRAM: ICD-10-CM

## 2022-12-19 DIAGNOSIS — N63.15 MASS OVERLAPPING MULTIPLE QUADRANTS OF RIGHT BREAST: ICD-10-CM

## 2022-12-19 DIAGNOSIS — N63.15 MASS OVERLAPPING MULTIPLE QUADRANTS OF RIGHT BREAST: Primary | ICD-10-CM

## 2022-12-19 PROCEDURE — 19083 BX BREAST 1ST LESION US IMAG: CPT | Performed by: SURGERY

## 2022-12-19 PROCEDURE — 38505 NEEDLE BIOPSY LYMPH NODES: CPT | Performed by: SURGERY

## 2022-12-19 PROCEDURE — 77065 DX MAMMO INCL CAD UNI: CPT

## 2022-12-19 PROCEDURE — 76998 US GUIDE INTRAOP: CPT | Performed by: SURGERY

## 2022-12-19 PROCEDURE — 19084 BX BREAST ADD LESION US IMAG: CPT | Performed by: SURGERY

## 2022-12-21 ENCOUNTER — TELEPHONE (OUTPATIENT)
Dept: OTHER | Age: 67
End: 2022-12-21

## 2022-12-21 ENCOUNTER — TELEPHONE (OUTPATIENT)
Dept: SURGERY | Age: 67
End: 2022-12-21

## 2022-12-21 DIAGNOSIS — C50.311 MALIGNANT NEOPLASM OF LOWER-INNER QUADRANT OF RIGHT FEMALE BREAST, UNSPECIFIED ESTROGEN RECEPTOR STATUS (HCC): ICD-10-CM

## 2022-12-21 DIAGNOSIS — C50.311 MALIGNANT NEOPLASM OF LOWER-INNER QUADRANT OF RIGHT FEMALE BREAST, UNSPECIFIED ESTROGEN RECEPTOR STATUS (HCC): Primary | ICD-10-CM

## 2022-12-21 DIAGNOSIS — Z84.81 FAMILY HISTORY OF BREAST CANCER GENE MUTATION IN FIRST DEGREE RELATIVE: ICD-10-CM

## 2022-12-21 DIAGNOSIS — C50.211 MALIGNANT NEOPLASM OF UPPER-INNER QUADRANT OF RIGHT FEMALE BREAST, UNSPECIFIED ESTROGEN RECEPTOR STATUS (HCC): Primary | ICD-10-CM

## 2022-12-21 DIAGNOSIS — C50.211 MALIGNANT NEOPLASM OF UPPER-INNER QUADRANT OF RIGHT FEMALE BREAST, UNSPECIFIED ESTROGEN RECEPTOR STATUS (HCC): ICD-10-CM

## 2022-12-21 DIAGNOSIS — Z80.42 FAMILY HISTORY OF PROSTATE CANCER IN FATHER: ICD-10-CM

## 2022-12-21 NOTE — TELEPHONE ENCOUNTER
Gave results. Please cancel follow up appt with me and schedule MRI and breast talk with Dr. Ginger Aranda. Orders placed. Please schedule the first week of January. She retires at the end of the year.

## 2022-12-21 NOTE — TELEPHONE ENCOUNTER
Left a detailed message on identified voicemail  to offer Nurse Navigator services. We spoke at length about navigator services and I offered to mail a Breast Cancer Treatment Handbook to her and she agreed that would be good. I included a business card with contact information, [employer] flyer, [employer] information and a welcome letter. I encouraged her to reach out at anytime with questions or concerns. Appointments not scheduled at this time. Pt prefers MRI to be in January due to work. My chart message sent. [employer] offered due to family history and personal.  Pt accepted and saliva kit to be shipped. Will follow up after breast talk. No questions at this time.

## 2022-12-21 NOTE — TELEPHONE ENCOUNTER
Left Message for Patient     MRI scheduled on 1/11/2023 at 8:30 Am. All instructions for test were given.   Breast US Scheduled before breast talk at [facility] on 1/12/2023 @ 4 PM. She has a Breast talk with Dr. Natasha Zepeda at 5 PM.

## 2023-01-04 ENCOUNTER — OFFICE VISIT (OUTPATIENT)
Dept: FAMILY MEDICINE CLINIC | Facility: CLINIC | Age: 68
End: 2023-01-04
Payer: MEDICARE

## 2023-01-04 ENCOUNTER — TELEPHONE (OUTPATIENT)
Dept: OTHER | Age: 68
End: 2023-01-04

## 2023-01-04 VITALS
DIASTOLIC BLOOD PRESSURE: 72 MMHG | OXYGEN SATURATION: 98 % | HEART RATE: 74 BPM | HEIGHT: 68 IN | BODY MASS INDEX: 26.98 KG/M2 | RESPIRATION RATE: 16 BRPM | WEIGHT: 178 LBS | SYSTOLIC BLOOD PRESSURE: 136 MMHG | TEMPERATURE: 98.5 F

## 2023-01-04 DIAGNOSIS — C50.211 MALIGNANT NEOPLASM OF UPPER-INNER QUADRANT OF RIGHT FEMALE BREAST, UNSPECIFIED ESTROGEN RECEPTOR STATUS (HCC): ICD-10-CM

## 2023-01-04 DIAGNOSIS — C50.311 MALIGNANT NEOPLASM OF LOWER-INNER QUADRANT OF RIGHT FEMALE BREAST, UNSPECIFIED ESTROGEN RECEPTOR STATUS (HCC): ICD-10-CM

## 2023-01-04 DIAGNOSIS — Z80.42 FAMILY HISTORY OF PROSTATE CANCER IN FATHER: ICD-10-CM

## 2023-01-04 DIAGNOSIS — Z80.3 FAMILY HISTORY OF BREAST CANCER IN FIRST DEGREE RELATIVE: ICD-10-CM

## 2023-01-04 DIAGNOSIS — C50.311 MALIGNANT NEOPLASM OF LOWER-INNER QUADRANT OF RIGHT FEMALE BREAST, UNSPECIFIED ESTROGEN RECEPTOR STATUS (HCC): Primary | ICD-10-CM

## 2023-01-04 DIAGNOSIS — Z84.81 FAMILY HISTORY OF BREAST CANCER GENE MUTATION IN FIRST DEGREE RELATIVE: ICD-10-CM

## 2023-01-04 DIAGNOSIS — I10 ESSENTIAL HYPERTENSION: Primary | ICD-10-CM

## 2023-01-04 LAB
ALBUMIN SERPL-MCNC: 4.2 G/DL (ref 3.5–5.2)
ALP BLD-CCNC: 74 U/L (ref 35–104)
ALT SERPL-CCNC: 21 U/L (ref 5–33)
ANION GAP SERPL CALCULATED.3IONS-SCNC: 10 MMOL/L (ref 7–19)
AST SERPL-CCNC: 22 U/L (ref 5–32)
BASOPHILS ABSOLUTE: 0.1 K/UL (ref 0–0.2)
BASOPHILS RELATIVE PERCENT: 1.1 % (ref 0–1)
BILIRUB SERPL-MCNC: 0.5 MG/DL (ref 0.2–1.2)
BUN BLDV-MCNC: 11 MG/DL (ref 8–23)
CALCIUM SERPL-MCNC: 9.7 MG/DL (ref 8.8–10.2)
CHLORIDE BLD-SCNC: 103 MMOL/L (ref 98–111)
CHOLESTEROL, TOTAL: 157 MG/DL (ref 160–199)
CO2: 28 MMOL/L (ref 22–29)
CREAT SERPL-MCNC: 0.6 MG/DL (ref 0.5–0.9)
EOSINOPHILS ABSOLUTE: 0.4 K/UL (ref 0–0.6)
EOSINOPHILS RELATIVE PERCENT: 6 % (ref 0–5)
GFR SERPL CREATININE-BSD FRML MDRD: >60 ML/MIN/{1.73_M2}
GLUCOSE BLD-MCNC: 171 MG/DL (ref 74–109)
HCT VFR BLD CALC: 42.2 % (ref 37–47)
HDLC SERPL-MCNC: 56 MG/DL (ref 65–121)
HEMOGLOBIN: 12.8 G/DL (ref 12–16)
IMMATURE GRANULOCYTES #: 0 K/UL
LDL CHOLESTEROL CALCULATED: 87 MG/DL
LYMPHOCYTES ABSOLUTE: 2.4 K/UL (ref 1.1–4.5)
LYMPHOCYTES RELATIVE PERCENT: 38.6 % (ref 20–40)
MCH RBC QN AUTO: 24.2 PG (ref 27–31)
MCHC RBC AUTO-ENTMCNC: 30.3 G/DL (ref 33–37)
MCV RBC AUTO: 79.9 FL (ref 81–99)
MONOCYTES ABSOLUTE: 0.4 K/UL (ref 0–0.9)
MONOCYTES RELATIVE PERCENT: 6.9 % (ref 0–10)
NEUTROPHILS ABSOLUTE: 2.9 K/UL (ref 1.5–7.5)
NEUTROPHILS RELATIVE PERCENT: 46.9 % (ref 50–65)
PDW BLD-RTO: 14.2 % (ref 11.5–14.5)
PLATELET # BLD: 265 K/UL (ref 130–400)
PMV BLD AUTO: 9.7 FL (ref 9.4–12.3)
POTASSIUM SERPL-SCNC: 4.6 MMOL/L (ref 3.5–5)
RBC # BLD: 5.28 M/UL (ref 4.2–5.4)
SODIUM BLD-SCNC: 141 MMOL/L (ref 136–145)
TOTAL PROTEIN: 6.9 G/DL (ref 6.6–8.7)
TRIGL SERPL-MCNC: 70 MG/DL (ref 0–149)
WBC # BLD: 6.2 K/UL (ref 4.8–10.8)

## 2023-01-04 PROCEDURE — 1170F FXNL STATUS ASSESSED: CPT | Performed by: FAMILY MEDICINE

## 2023-01-04 PROCEDURE — 1160F RVW MEDS BY RX/DR IN RCRD: CPT | Performed by: FAMILY MEDICINE

## 2023-01-04 PROCEDURE — 3075F SYST BP GE 130 - 139MM HG: CPT | Performed by: FAMILY MEDICINE

## 2023-01-04 PROCEDURE — 3078F DIAST BP <80 MM HG: CPT | Performed by: FAMILY MEDICINE

## 2023-01-04 PROCEDURE — G0439 PPPS, SUBSEQ VISIT: HCPCS | Performed by: FAMILY MEDICINE

## 2023-01-04 NOTE — PROGRESS NOTES
Subjective   Karen Tipton is a 67 y.o. female.     Chief Complaint   Patient presents with   • Medicare Wellness-subsequent        History of Present Illness     she notes good bp; control without cp carla ha      Current Outpatient Medications:   •  aspirin 81 MG EC tablet, Take 81 mg by mouth., Disp: , Rfl:   •  Cholecalciferol (VITAMIN D PO), Take  by mouth Daily., Disp: , Rfl:   •  lisinopril-hydrochlorothiazide (PRINZIDE,ZESTORETIC) 10-12.5 MG per tablet, Take 1 tablet by mouth once daily, Disp: 90 tablet, Rfl: 0  •  Multiple Vitamin (MULTI-VITAMIN DAILY PO), Take  by mouth., Disp: , Rfl:   •  ROYAL JELLY PO, Take  by mouth Daily., Disp: , Rfl:   •  carbamide peroxide (Debrox) 6.5 % otic solution, Administer 5 drops into both ears 2 (Two) Times a Day., Disp: 15 mL, Rfl: 1  •  gentamicin (GARAMYCIN) 0.3 % ophthalmic solution, Administer 1 drop to the right eye Every 4 (Four) Hours., Disp: 5 mL, Rfl: 0  No Known Allergies    BMI is >= 25 and <30. (Overweight) The following options were offered after discussion;: nutrition counseling/recommendations      Past Medical History:   Diagnosis Date   • Colon polyp    • Hypertension      Past Surgical History:   Procedure Laterality Date   • BREAST BIOPSY     • COLONOSCOPY  06/10/2014    3 polyps destroyed   • COLONOSCOPY N/A 8/5/2019    Procedure: COLONOSCOPY WITH ANESTHESIA;  Surgeon: Ismael Ramachandran MD;  Location: [facility] ENDOSCOPY;  Service: Gastroenterology       Review of Systems   Constitutional: Negative.    HENT: Negative.    Eyes: Negative.    Respiratory: Negative.    Cardiovascular: Negative.    Gastrointestinal: Negative.    Endocrine: Negative.    Genitourinary: Negative.    Musculoskeletal: Negative.    Skin: Negative.    Allergic/Immunologic: Negative.    Neurological: Negative.    Hematological: Negative.    Psychiatric/Behavioral: Negative.        Objective  /72   Pulse 74   Temp 98.5 °F (36.9 °C)   Resp 16   Ht 172.7 cm (68\")   Wt 80.7 kg  (178 lb)   SpO2 98%   BMI 27.06 kg/m²   Physical Exam  Vitals and nursing note reviewed.   Constitutional:       Appearance: Normal appearance. She is normal weight.   HENT:      Head: Normocephalic and atraumatic.      Nose: Nose normal.      Mouth/Throat:      Mouth: Mucous membranes are moist.   Eyes:      Extraocular Movements: Extraocular movements intact.      Conjunctiva/sclera: Conjunctivae normal.      Pupils: Pupils are equal, round, and reactive to light.   Cardiovascular:      Rate and Rhythm: Normal rate and regular rhythm.      Pulses: Normal pulses.      Heart sounds: Normal heart sounds.   Pulmonary:      Effort: Pulmonary effort is normal.      Breath sounds: Normal breath sounds.   Abdominal:      General: Abdomen is flat. Bowel sounds are normal.   Musculoskeletal:         General: Normal range of motion.      Cervical back: Normal range of motion and neck supple.   Skin:     General: Skin is warm and dry.      Capillary Refill: Capillary refill takes less than 2 seconds.   Neurological:      General: No focal deficit present.      Mental Status: She is alert and oriented to person, place, and time. Mental status is at baseline.   Psychiatric:         Mood and Affect: Mood normal.         Assessment & Plan   Diagnoses and all orders for this visit:    1. Essential hypertension (Primary)  -     CBC & Differential  -     Comprehensive metabolic panel  -     Lipid Panel With / Chol / HDL Ratio                 Orders Placed This Encounter   Procedures   • Comprehensive metabolic panel     Order Specific Question:   Release to patient     Answer:   Routine Release   • Lipid Panel With / Chol / HDL Ratio     Order Specific Question:   Release to patient     Answer:   Routine Release   • CBC & Differential       Follow up: 6 month(s)

## 2023-01-04 NOTE — PROGRESS NOTES
The ABCs of the Annual Wellness Visit  Subsequent Medicare Wellness Visit    Subjective      Karen Tipton is a 67 y.o. female who presents for a Subsequent Medicare Wellness Visit.    The following portions of the patient's history were reviewed and   updated as appropriate: allergies, current medications, past family history, past medical history, past social history, past surgical history and problem list.    Compared to one year ago, the patient feels her physical   health is the same.    Compared to one year ago, the patient feels her mental   health is the same.    Recent Hospitalizations:  She was not admitted to the hospital during the last year.       Current Medical Providers:  Patient Care Team:  Jamin Brambila MD as PCP - General  Ismael Ramachandran MD as Consulting Physician (Gastroenterology)    Outpatient Medications Prior to Visit   Medication Sig Dispense Refill   • aspirin 81 MG EC tablet Take 81 mg by mouth.     • Cholecalciferol (VITAMIN D PO) Take  by mouth Daily.     • lisinopril-hydrochlorothiazide (PRINZIDE,ZESTORETIC) 10-12.5 MG per tablet Take 1 tablet by mouth once daily 90 tablet 0   • Multiple Vitamin (MULTI-VITAMIN DAILY PO) Take  by mouth.     • ROYAL JELLY PO Take  by mouth Daily.     • carbamide peroxide (Debrox) 6.5 % otic solution Administer 5 drops into both ears 2 (Two) Times a Day. 15 mL 1   • gentamicin (GARAMYCIN) 0.3 % ophthalmic solution Administer 1 drop to the right eye Every 4 (Four) Hours. 5 mL 0     No facility-administered medications prior to visit.       No opioid medication identified on active medication list. I have reviewed chart for other potential  high risk medication/s and harmful drug interactions in the elderly.          Aspirin is on active medication list. Aspirin use is not indicated based on review of current medical condition/s. Risk of harm outweighs potential benefits. Patient instructed to discontinue this medication.  .      Patient Active  Problem List   Diagnosis   • Essential hypertension   • Low back pain without sciatica   • Hx of colonic polyp   • Bilateral impacted cerumen     Advance Care Planning  Advance Directive is on file.  ACP discussion was held with the patient during this visit. Patient has an advance directive in EMR which is still valid.      Objective    Vitals:    01/04/23 0856   BP: 136/72   Pulse: 74   Resp: 16   Temp: 98.5 °F (36.9 °C)   SpO2: 98%   Weight: 80.7 kg (178 lb)   Height: 172.7 cm (68\")     Estimated body mass index is 27.06 kg/m² as calculated from the following:    Height as of this encounter: 172.7 cm (68\").    Weight as of this encounter: 80.7 kg (178 lb).    BMI is >= 25 and <30. (Overweight) The following options were offered after discussion;: nutrition counseling/recommendations      Does the patient have evidence of cognitive impairment?   Yes: Continue current medications.            HEALTH RISK ASSESSMENT    Smoking Status:  Social History     Tobacco Use   Smoking Status Never   Smokeless Tobacco Never     Alcohol Consumption:  Social History     Substance and Sexual Activity   Alcohol Use Yes    Comment: rare     Fall Risk Screen:    STEADI Fall Risk Assessment was completed, and patient is at LOW risk for falls.Assessment completed on:1/4/2023    Depression Screening:  PHQ-2/PHQ-9 Depression Screening 1/4/2023   Little Interest or Pleasure in Doing Things 0-->not at all   Feeling Down, Depressed or Hopeless 0-->not at all   PHQ-9: Brief Depression Severity Measure Score 0       Health Habits and Functional and Cognitive Screening:  Functional & Cognitive Status 1/4/2023   Do you have difficulty preparing food and eating? No   Do you have difficulty bathing yourself, getting dressed or grooming yourself? No   Do you have difficulty using the toilet? No   Do you have difficulty moving around from place to place? No   Do you have trouble with steps or getting out of a bed or a chair? No   Current Diet Well  Balanced Diet   Dental Exam Up to date   Eye Exam Up to date   Exercise (times per week) 5 times per week   Current Exercises Include Walking   Current Exercise Activities Include -   Do you need help using the phone?  No   Are you deaf or do you have serious difficulty hearing?  No   Do you need help with transportation? No   Do you need help shopping? No   Do you need help preparing meals?  No   Do you need help with housework?  No   Do you need help with laundry? No   Do you need help taking your medications? No   Do you need help managing money? No   Do you ever drive or ride in a car without wearing a seat belt? No   Have you felt unusual stress, anger or loneliness in the last month? No   Who do you live with? Spouse   If you need help, do you have trouble finding someone available to you? No   Have you been bothered in the last four weeks by sexual problems? No   Do you have difficulty concentrating, remembering or making decisions? No       Age-appropriate Screening Schedule:  Refer to the list below for future screening recommendations based on patient's age, sex and/or medical conditions. Orders for these recommended tests are listed in the plan section. The patient has been provided with a written plan.    Health Maintenance   Topic Date Due   • DXA SCAN  Never done   • TDAP/TD VACCINES (1 - Tdap) Never done   • ZOSTER VACCINE (1 of 2) Never done   • PAP SMEAR  Never done   • MAMMOGRAM  12/08/2024   • INFLUENZA VACCINE  Completed                CMS Preventative Services Quick Reference  Risk Factors Identified During Encounter:    Chronic Pain: Chronic Pain Educational material Discussed and Shared in After Visit Summary for Patient.    The above risks/problems have been discussed with the patient.  Pertinent information has been shared with the patient in the After Visit Summary.    Diagnoses and all orders for this visit:    1. Essential hypertension (Primary)  -     CBC & Differential  -      Comprehensive metabolic panel  -     Lipid Panel With / Chol / HDL Ratio        Follow Up:   Next Medicare Wellness visit to be scheduled in 1 year.      An After Visit Summary and PPPS were made available to the patient.

## 2023-01-04 NOTE — TELEPHONE ENCOUNTER
Pt states she has not received her saliva kit in the mail. Pt is having annual blood work with Dr. Dumont Host office. She will get lab orders and take them to outpatient lab. New order placed for [facility]. Checking with rep on saliva kit not being sent out. Pt verifying where MRI building is.

## 2023-01-09 ENCOUNTER — TELEPHONE (OUTPATIENT)
Dept: SURGERY | Age: 68
End: 2023-01-09

## 2023-01-09 NOTE — TELEPHONE ENCOUNTER
1/9/2023 Summer with [facility] called and stated that she is needing a copy of the imaging report for patient.     Callback # [phone]  amanda

## 2023-01-10 NOTE — PROGRESS NOTES
HISTORY OF PRESENT ILLNESS:    Ms. Lila Barrett  is recently status post ultrasound guided breast biopsy on the right x's 2 areas. At 1:00 revealed a 1.6  cm high grade invasive ductal carcinoma. At 4:00 revealed a 1 cm intermediate grade invasive ductal mammary carcinoma. (1:00) ER positive at 99%. WV negative. Her2 positive. Ki67 55%. (4:00) ER positive at 96%. WV positive at 100%. Her2 positive. Ki67 14%. FISH Negative for both. Ultrasound-guided FNA of right axillary node was negative for malignancy    Mammaprint is High Risk Luminal B-Type     MRI-1/11/2023       EXAM: MRI BREAST BILATERAL W WO CONTRAST -- 1/11/2023 8:45 AM   INDICATION: New breast cancer. Evaluate extent of disease. HISTORY: 75 years, Female, C50.211. Surgical pathology report   12/19/2022 \"RIGHT breast 1:00 core biopsies: Invasive carcinoma of no   special type (ductal). RIGHT breast 4:00 core biopsy: Invasive   carcinoma of no special type (ductal). \" Cytology report 12/19/2022   RIGHT axillary lymph node fine-needle aspiration negative for   malignant cells. Initial staging. Patient reports bilateral breast   tenderness. COMPARISON: 12/19/2022, 12/8/2022, 8/9/2021   FAMILY HISTORY OF BREAST CANCER: None reported   TECHNIQUE: Routine protocol MRI performed of the breasts without and   with intravenous contrast. Post processing performed on a separate   workstation. FINDINGS:   Amount of Fibroglandular Tissue: Heterogeneous fibroglandular tissue. Background Parenchymal Enhancement:  Mild. RIGHT breast with a 4.5 x 2.8 cm (AP by transverse) bilobed or 2   closely adjacent round masses with partially spiculated margins,   heterogeneous enhancement rapid washout kinetics with associated   nonmass enhancement measuring 7.4 x 3.8 cm (AP by transverse) in the   middle third, upper inner quadrant, 1:00 position, anterior aspect 3.8   cm from the nipple and posterior aspect 1.8 cm from the chest wall.    This correlates with biopsy-proven malignancy and has biopsy marker at   the posterior aspect of the mass. Nonmass enhancement extends to and   appears to involve the medial skin surface and apparent subtle skin   thickening, concerning for invasion. Prior RIGHT breast ultrasound   demonstrated two separate correlating masses measuring 2.3 x 2.0 x 2.1   cm and 1.6 x 1.9 x 1.1 cm on 12/8/2022, which are measured in   aggregate on today's MRI. RIGHT breast with a 1.2 x 1.1 cm round circumscribed mass (on   postcontrast series 500, image 55) with rapid washout kinetics   situated in nonmass enhancement (on postcontrast subtraction images)   measuring up to 1.8 cm in the far posterior third, lower inner   quadrant, 4:00 position, 0.4 cm from the chest wall. This correlates   with biopsy-proven malignancy and has adjacent biopsy marker. Prior   ultrasound measured a correlating mass previously measured 1.2 x 1.3 x   0.9 cm on 12/8/2022. RIGHT breast with a 0.8 cm round homogeneously enhancing with plateau   and persisting kinetics in the posterior third, slightly upper outer   quadrant, 11:00 position, 2 cm lateral to the above-described dominant   RIGHT upper inner quadrant mass, concerning for satellite lesion. No focally suspicious finding in the LEFT breast.   There are 2 slightly asymmetric low-lying RIGHT axillary lymph nodes   on postcontrast axial series 500, images 79 and 94, which are   indeterminant. Prior RIGHT axillary ultrasound 12/8/2022 demonstrates   a lymph node with nodular cortical thickening, which is favored to   correlate with the lymph node on axial image 79. Appearance is   concerning for willy disease. No LEFT axillary adenopathy. No internal mammary adenopathy. Enlarged LEFT thyroid which appears to have a dominant nodule   measuring 3 cm. Otherwise the visualized portion of the chest and   abdomen appears grossly within normal limits, not optimally assessed   on this exam. No focally suspicious bony finding. Impression   1. RIGHT breast upper inner quadrant dominant mass, consistent with   biopsy proven malignancy. There is nonmass enhancement extending to   the medial skin, which appears to have subtle thickening, concerning   for invasion. 2. RIGHT breast lower inner quadrant 4:00 position mass, consistent   with biopsy proven malignancy. Note that the posterior aspect of the   mass is 0.4 cm from the chest wall. 3. RIGHT breast upper outer quadrant with a 0.8 cm circumscribed mass,   concerning for satellite disease. 4. Overall appearance of the RIGHT breast concerning for multicentric   disease. 5. Suspicious low-lying RIGHT axillary lymph node as above. Although   this was negative by fine-needle aspiration, the imaging appearance   remains suspicious. 6. No internal mammary or LEFT axillary adenopathy. 7. LEFT 3 cm thyroid nodule, not optimally evaluated by MRI. If not   previously performed, consider nonemergent thyroid ultrasound. BI-RADS Final Assessment Category 6: Known Biopsy-Proven Malignancy   Management Recommendation: Surgical excision when clinically   appropriate. DISCUSSION:  I had a lengthy discussion with Ms. Leatha Neff  and her family about the ramifications of the diagnosis of breast cancer. We discussed the pathophysiology of cancer in general and also the ways in which surgery, radiation therapy, and chemotherapy are utilized in the treatment of different types of cancers. We also explained how these modalities related to her situation in particular. We discussed the pathophysiology of breast cancer and some length, including what is known about the causes of breast cancer, its relationship to fibrocystic disease, its relationship to hormone replacement therapy, and some of the genetic aspects involved in familial breast cancers. We discussed the BrCa genetic analysis and why it is appropriate for her.   We discussed breast MRI and how it assists in evaluation of breast cancers and the results of her MRI if done. We discussed the surgical options including simple mastectomy and lumpectomy with  sentinel lymph node biopsy as well as the possibility of axillary lymph node dissection. We explained in depth why breast conservation therapy requires radiation treatments for the majority of women. These treatments may be external beam for 6 weeks, partial breast for 5 days,  or intraoperative. I explained that most women treated for invasive malignancy do receive systemic therapy, hormonal therapy or  chemotherapy postoperatively depending upon the final pathology, the lymph node status, and the hormone receptor status. I discussed Oncotype Dx, Mammoprint, and Adjuvant Online as tools which aid in the decision for chemotherapy or hormonal therapy. We also discussed the possibility of breast reconstruction if a mastectomy was required. .  I explained to her the different techniques including placement of a subpectoral implant with a Alloderm sling  versus TRAM flap reconstruction as welll as other methods of reconstruction. She does not wish to pursue reconstruction at this time. After a prolonged discussion lasting 120 minutes  we felt it was most appropriate that she undergo  neoadjuvant chemotherapy and port placement      We discussed the risks and benefits of the surgery including but not limited to bleeding, infection, pain, lymphedema, numbness, and also the risks of general anesthesia including pneumonia, blood clots, heart attack, stroke, and death. She expresses good understanding and is agreeable to proceed with surgery.       We will schedule this to be done as soon as possible  at [facility]. Yes

## 2023-01-11 ENCOUNTER — HOSPITAL ENCOUNTER (OUTPATIENT)
Dept: MRI IMAGING | Age: 68
Discharge: HOME | End: 2023-01-11
Payer: MEDICARE

## 2023-01-11 DIAGNOSIS — C50.211 MALIGNANT NEOPLASM OF UPPER-INNER QUADRANT OF RIGHT FEMALE BREAST, UNSPECIFIED ESTROGEN RECEPTOR STATUS (HCC): ICD-10-CM

## 2023-01-11 DIAGNOSIS — C50.311 MALIGNANT NEOPLASM OF LOWER-INNER QUADRANT OF RIGHT FEMALE BREAST, UNSPECIFIED ESTROGEN RECEPTOR STATUS (HCC): ICD-10-CM

## 2023-01-11 LAB
GFR SERPL CREATININE-BSD FRML MDRD: >60 ML/MIN/{1.73_M2}
PERFORMED ON: NORMAL
POC CREATININE: 0.7 MG/DL (ref 0.3–1.3)
POC SAMPLE TYPE: NORMAL

## 2023-01-11 PROCEDURE — 82565 ASSAY OF CREATININE: CPT

## 2023-01-11 PROCEDURE — A9577 INJ MULTIHANCE: HCPCS | Performed by: PHYSICIAN ASSISTANT

## 2023-01-11 PROCEDURE — 6360000004 HC RX CONTRAST MEDICATION: Performed by: PHYSICIAN ASSISTANT

## 2023-01-11 PROCEDURE — C8908 MRI W/O FOL W/CONT, BREAST,: HCPCS

## 2023-01-11 RX ADMIN — GADOBENATE DIMEGLUMINE 15 ML: 529 INJECTION, SOLUTION INTRAVENOUS at 09:37

## 2023-01-12 ENCOUNTER — HOSPITAL ENCOUNTER (OUTPATIENT)
Dept: WOMENS IMAGING | Age: 68
Discharge: HOME | End: 2023-01-12

## 2023-01-12 ENCOUNTER — INITIAL CONSULT (OUTPATIENT)
Dept: SURGERY | Age: 68
End: 2023-01-12

## 2023-01-12 DIAGNOSIS — C50.311 MALIGNANT NEOPLASM OF LOWER-INNER QUADRANT OF RIGHT BREAST OF FEMALE, ESTROGEN RECEPTOR POSITIVE (HCC): Primary | ICD-10-CM

## 2023-01-12 DIAGNOSIS — C50.211 MALIGNANT NEOPLASM OF UPPER-INNER QUADRANT OF RIGHT FEMALE BREAST, UNSPECIFIED ESTROGEN RECEPTOR STATUS (HCC): ICD-10-CM

## 2023-01-12 DIAGNOSIS — C50.211 MALIGNANT NEOPLASM OF UPPER-INNER QUADRANT OF RIGHT BREAST IN FEMALE, ESTROGEN RECEPTOR POSITIVE (HCC): ICD-10-CM

## 2023-01-12 DIAGNOSIS — Z17.0 MALIGNANT NEOPLASM OF LOWER-INNER QUADRANT OF RIGHT BREAST OF FEMALE, ESTROGEN RECEPTOR POSITIVE (HCC): Primary | ICD-10-CM

## 2023-01-12 DIAGNOSIS — C50.311 MALIGNANT NEOPLASM OF LOWER-INNER QUADRANT OF RIGHT FEMALE BREAST, UNSPECIFIED ESTROGEN RECEPTOR STATUS (HCC): ICD-10-CM

## 2023-01-12 DIAGNOSIS — Z17.0 MALIGNANT NEOPLASM OF UPPER-INNER QUADRANT OF RIGHT BREAST IN FEMALE, ESTROGEN RECEPTOR POSITIVE (HCC): ICD-10-CM

## 2023-01-12 NOTE — Clinical Note
Metastatic work-up CT chest, abdomen pelvis, Bone scan Echocardiogram Appointment Brandon RODRIGUEZ They will let us know if she needs a port

## 2023-01-13 ENCOUNTER — TELEPHONE (OUTPATIENT)
Dept: OTHER | Age: 68
End: 2023-01-13

## 2023-01-13 DIAGNOSIS — C50.211 MALIGNANT NEOPLASM OF UPPER-INNER QUADRANT OF RIGHT BREAST IN FEMALE, ESTROGEN RECEPTOR POSITIVE (HCC): ICD-10-CM

## 2023-01-13 DIAGNOSIS — C50.311 MALIGNANT NEOPLASM OF LOWER-INNER QUADRANT OF RIGHT BREAST OF FEMALE, ESTROGEN RECEPTOR POSITIVE (HCC): ICD-10-CM

## 2023-01-13 DIAGNOSIS — Z17.0 MALIGNANT NEOPLASM OF UPPER-INNER QUADRANT OF RIGHT BREAST IN FEMALE, ESTROGEN RECEPTOR POSITIVE (HCC): ICD-10-CM

## 2023-01-13 DIAGNOSIS — Z17.0 MALIGNANT NEOPLASM OF LOWER-INNER QUADRANT OF RIGHT BREAST OF FEMALE, ESTROGEN RECEPTOR POSITIVE (HCC): ICD-10-CM

## 2023-01-13 RX ORDER — MONTELUKAST SODIUM 10 MG/1
10 TABLET ORAL DAILY
Qty: 30 TABLET | Refills: 3 | Status: SHIPPED | OUTPATIENT
Start: 2023-01-13

## 2023-01-13 NOTE — TELEPHONE ENCOUNTER
Pt did not have any questions about plan. Reviewed appointment location timess and dates with patient. Miscommunication given about arrival location for scans but patient informed [facility] is where she is scheduled.

## 2023-01-17 RX ORDER — LISINOPRIL AND HYDROCHLOROTHIAZIDE 12.5; 1 MG/1; MG/1
TABLET ORAL
Qty: 90 TABLET | Refills: 0 | Status: SHIPPED | OUTPATIENT
Start: 2023-01-17

## 2023-01-18 LAB
Lab: NORMAL
Lab: NORMAL

## 2023-01-20 ENCOUNTER — HOSPITAL ENCOUNTER (OUTPATIENT)
Dept: CT IMAGING | Age: 68
Discharge: HOME | End: 2023-01-20
Payer: MEDICARE

## 2023-01-20 ENCOUNTER — HOSPITAL ENCOUNTER (OUTPATIENT)
Dept: NUCLEAR MEDICINE | Age: 68
Discharge: HOME | End: 2023-01-22
Payer: MEDICARE

## 2023-01-20 ENCOUNTER — TELEPHONE (OUTPATIENT)
Dept: OTHER | Age: 68
End: 2023-01-20

## 2023-01-20 DIAGNOSIS — Z17.0 MALIGNANT NEOPLASM OF LOWER-INNER QUADRANT OF RIGHT BREAST OF FEMALE, ESTROGEN RECEPTOR POSITIVE (HCC): ICD-10-CM

## 2023-01-20 DIAGNOSIS — C50.311 MALIGNANT NEOPLASM OF LOWER-INNER QUADRANT OF RIGHT BREAST OF FEMALE, ESTROGEN RECEPTOR POSITIVE (HCC): ICD-10-CM

## 2023-01-20 DIAGNOSIS — Z17.0 MALIGNANT NEOPLASM OF UPPER-INNER QUADRANT OF RIGHT BREAST IN FEMALE, ESTROGEN RECEPTOR POSITIVE (HCC): ICD-10-CM

## 2023-01-20 DIAGNOSIS — C50.211 MALIGNANT NEOPLASM OF UPPER-INNER QUADRANT OF RIGHT BREAST IN FEMALE, ESTROGEN RECEPTOR POSITIVE (HCC): ICD-10-CM

## 2023-01-20 PROCEDURE — 71260 CT THORAX DX C+: CPT

## 2023-01-20 PROCEDURE — 6360000004 HC RX CONTRAST MEDICATION: Performed by: SURGERY

## 2023-01-20 PROCEDURE — 3430000000 HC RX DIAGNOSTIC RADIOPHARMACEUTICAL: Performed by: SURGERY

## 2023-01-20 PROCEDURE — 78306 BONE IMAGING WHOLE BODY: CPT | Performed by: RADIOLOGY

## 2023-01-20 PROCEDURE — 74177 CT ABD & PELVIS W/CONTRAST: CPT

## 2023-01-20 PROCEDURE — 78306 BONE IMAGING WHOLE BODY: CPT | Performed by: SURGERY

## 2023-01-20 PROCEDURE — A9503 TC99M MEDRONATE: HCPCS | Performed by: SURGERY

## 2023-01-20 RX ORDER — TC 99M MEDRONATE 20 MG/10ML
20 INJECTION, POWDER, LYOPHILIZED, FOR SOLUTION INTRAVENOUS
Status: COMPLETED | OUTPATIENT
Start: 2023-01-20 | End: 2023-01-20

## 2023-01-20 RX ADMIN — TC 99M MEDRONATE 20 MILLICURIE: 20 INJECTION, POWDER, LYOPHILIZED, FOR SOLUTION INTRAVENOUS at 14:05

## 2023-01-20 RX ADMIN — IOPAMIDOL 75 ML: 755 INJECTION, SOLUTION INTRAVENOUS at 11:18

## 2023-01-20 NOTE — PROGRESS NOTES
MEDICAL ONCOLOGY CONSULTATION    Pt Name: Jo Talavera  MRN: [id number]  YOB: 1955  Date of evaluation: 1/25/2023    REASON FOR CONSULTATION:  Breast cancer  REQUESTING PHYSICIAN: Dr Darlene Strong    History Obtained From:  patient and old medical records    HISTORY OF PRESENT ILLNESS:    Diagnosis  Invasive ductal carcinoma, 1:00 right breast, Dec 2022  Grade 3  ER 99%, FL 0, HER-2 2+/equivocal, FISH-negative, Ki67 55%  MammaPrint: Luminal B/high risk. Invasive ductal carcinoma, 4:00 right breast, Dec 2022  ER 96%, %, HER-2 2+/equivocal, FISH-negative, Ki67 15%  MammaPrint: Luminal B/high risk. Grade 2  [facility] 81 gene genetic panel: Negative for pathogenic mutations; VUS BRIP1    Treatment Summary  Anticipate dose dense Adriamycin Cyclophosphamide + GCSF every 2 weeks x4 cycles followed by weekly Taxol x12 weeks  Anticipate surgery  Anticipate radiation therapy  Anticipate endocrine hormone therapy    Cancer History:  Karen Elizondo was first seen by me 1/25/2023. She felt a palpable mass in the right breast.  Family history significant for history of breast cancer paternal grandmother. No prior use of hormone replacement therapy. 12/8/22 US right breast: Real-time ultrasound performed by technologist, with ultrasound images presented for radiologist interpretation. Additionally I scanned the patient myself. Palpable area of concern is located at 1:00 5 cm from the nipple. Ultrasound at this location demonstrates a heterogeneously hypoechoic round noncircumscribed mass with microlobulated and angular margins measuring 20 x 20 x 23 mm. There is an additional similar appearing mass questionably with direct extension from this main palpable mass versus a few small millimeters of intervening tissue. This second additional mass measures 16 x 2 x 11 mm. The additional third mass is located at 4:00 11 cm from the nipple, also in similar morphological characteristics measuring 13 x 9 x 11 mm.  Imaging of the right axilla demonstrates at least 2 sonographically abnormal lymph nodes. One measures 16 mm in long axis with favored cortical thickening up to approximately 4 mm. An additional abnormal lymph node demonstrates a residual hilum but with eccentric cortical thickening. This lymph node measures up to 12 mm in long axis. 12/8/22 Bilateral diagnostic mammogram: Today's examination consists of 2-D/3-D combo standard craniocaudal and oblique views of both breasts and right breast spot compression views. Comparison is made  prior breast imaging since May 16, 2016 . Breast parenchyma is heterogeneously dense . The palpable area of concern is located in the right breast around 12:00 which corresponds to a round microlobulated high density mass measuring 23 x 22 x 23 mm in length by width by height. Just anterior is an additional second similar appearing mass measuring approximately 15 x 13 x 9 mm in length by width by height. Additionally there is a third partially visualized mass in the extreme far posterior lower inner quadrant measuring approximately 13 mm. No additional suspicious finding is seen. A biopsy clip is noted in the left breast along with a stable postsurgical scar. The mammograms were evaluated using computer aided detection. Next, right breast ultrasound was performed. 12/19//22 Right breast, 1:00, core biopsies: Invasive carcinoma of no special type (ductal), grade 3. Largest contiguous area of tumor measures 16 mm. ER 99%, [place] 0, HER-2 2+/equivocal, FISH-negative, Ki67 55%. MammaPrint: Luminal B/high risk. Right breast, 4:00, core biopsies: Invasive carcinoma of no special type (ductal), grade 2. Largest contiguous area of tumor measures 10 mm. ER 96%, %, HER-2 2+/equivocal, FISH-negative, Ki67 15%. MammaPrint: Luminal B/high risk. Lymph node, right axilla, fine-needle aspiration: Negative for malignant cells. Benign adipose tissue.    1/11/23 MRI bilateral breast: RIGHT breast upper inner quadrant dominant mass, consistent with biopsy proven malignancy. There is nonmass enhancement extending to the medial skin, which appears to have subtle thickening, concerning for invasion. RIGHT breast lower inner quadrant 4:00 position mass, consistent with biopsy proven malignancy. Note that the posterior aspect of the mass is 0.4 cm from the chest wall. RIGHT breast upper outer quadrant with a 0.8 cm circumscribed mass, concerning for satellite disease. Overall appearance of the RIGHT breast concerning for multicentric disease. Suspicious low-lying RIGHT axillary lymph node as above. Although this was negative by fine-needle aspiration, the imaging appearance remains suspicious. No internal mammary or LEFT axillary adenopathy. LEFT 3 cm thyroid nodule, not optimally evaluated by MRI. If not previously performed, consider nonemergent thyroid ultrasound. BI-RADS Final Assessment Category 6: Known Biopsy-Proven Malignancy Management Recommendation: Surgical excision when clinically appropriate. 1/18/22 [facility] 81 gene genetic panel: Negative for pathogenic mutations; VUS BRIP1  1/20/23 CT CHEST W CONTRAST  Multiple small subpleural nodules in the right upper lung field of indeterminate significance. Scattered borderline nonspecific intrathoracic adenopathy, as above. Suggest a follow-up chest CT in 3 months, and/or comparison with priors, if clinically appropriate. Lobulated mass lesions with punctate calcifications in the right breast. These are consistent with known malignant neoplasm of the right breast. Consider correlation with dedicated ultrasound and mammography, if clinically appropriate. A heterogenous hypodense nodule in the left lobe of the thyroid gland, and cannot exclude a small right thyroid nodule, as above, for which correlation with ultrasound is recommended. 1/20/23 CT ABDOMEN PELVIS W IV CONTRAST  Small sliding hiatal hernia is seen.  Mild thickening versus an element of underdistension is seen in the cecum and ascending colon. This could reflect an element of colitis due to infectious / inflammatory process. No metastatic lesions are seen. No ascites. Details as above. 1/20/23 NM BONE SCAN WHOLE BODY: There is no evidence of osseous metastatic disease. 1/25/23-she was first seen by me. Essentially, clinical T3 N1 M0 disease. I recommend neoadjuvant chemotherapy.   Treatment consent signed for dose dense Adriamycin Cyclophosphamide + GCSF every 2 weeks x4 cycles followed by weekly Taxol x12 weeks           1/18/22 [facility] 81 gene genetic panel          Past Medical History:    Past Medical History:   Diagnosis Date    Fibrocystic breast disease     Hypertension        Past Surgical History:    Past Surgical History:   Procedure Laterality Date    BREAST BIOPSY  5/2011    left breast mammotome - benign fibrocystic disease    BREAST CYST EXCISION  1973    left breast - benign    US BREAST BIOPSY W LOC DEVICE 1ST LESION RIGHT Right 12/19/2022    US BREAST NEEDLE BIOPSY RIGHT 12/19/2022 [facility] GENERAL SURGERY       Social History:    Marital status:   Smoking status:No  ETOH status:Occasionally ;1 drink  monthly  Resides: [place], [place]    Family History:   Family History   Problem Relation Age of Onset    Prostate Cancer Father         Age Unknown    Breast Cancer Paternal Grandmother     Cancer Paternal Grandfather         Prostate       Current Hospital Medications:    Current Outpatient Medications   Medication Sig Dispense Refill    ondansetron (ZOFRAN) 4 MG tablet Take 1 tablet by mouth every 6 hours as needed for Nausea or Vomiting 30 tablet 5    promethazine (PHENERGAN) 25 MG tablet Take 0.5 tablets by mouth every 6 hours as needed for Nausea 30 tablet 5    OLANZapine (ZYPREXA) 10 MG tablet Take 1 tablet at night x4 nights with each cycle chemo every 2 weeks x4 cycles 16 tablet 0    montelukast (SINGULAIR) 10 MG tablet Take 1 tablet by mouth daily 30 tablet 3    ROYAL JELLY PO Take by mouth daily      VITAMIN D, CHOLECALCIFEROL, PO Take by mouth daily      lisinopril-hydrochlorothiazide (PRINZIDE;ZESTORETIC) 10-12.5 MG per tablet       aspirin 81 MG EC tablet Take 81 mg by mouth daily. therapeutic multivitamin-minerals (THERAGRAN-M) tablet Take 1 tablet by mouth daily. No current facility-administered medications for this visit. Allergies: No Known Allergies      Subjective   REVIEW OF SYSTEMS:   CONSTITUTIONAL: no fever, no night sweats, no fatigue;  HEENT: no blurring of vision, no double vision, no hearing difficulty, no tinnitus, no ulceration, no dysplasia, no epistaxis;  LUNGS: no cough, no hemoptysis, no wheeze,  no shortness of breath;  CARDIOVASCULAR: no palpitation, no chest pain, no shortness of breath;  GI: no abdominal pain, no nausea, no vomiting, no diarrhea, no constipation;  MERCY: no dysuria, no hematuria, no frequency or urgency, no nephrolithiasis;  MUSCULOSKELETAL: no joint pain, no swelling, no stiffness;  ENDOCRINE: no polyuria, no polydipsia, no cold or heat intolerance;  HEMATOLOGY: no easy bruising or bleeding, no history of clotting disorder;  DERMATOLOGY: no skin rash, no eczema, no pruritus;  PSYCHIATRY: no depression, no anxiety, no panic attacks, no suicidal ideation, no homicidal ideation;  NEUROLOGY: no syncope, no seizures, no numbness or tingling of hands, no numbness or tingling of feet, no paresis;    Objective   /74   Pulse 72   Temp 98.3 °F (36.8 °C)   Ht 5' 8\" (1.727 m)   Wt 180 lb 4.8 oz (81.8 kg)   SpO2 96%   BMI 27.41 kg/m²     PHYSICAL EXAM:  CONSTITUTIONAL: Alert, appropriate, no acute distress  EYES: Non icteric, EOM intact, pupils equal round   ENT: Mucus membranes moist, no oral pharyngeal lesions, external inspection of ears and nose are normal  NECK: Supple, no masses. No palpable thyroid mass  CHEST/LUNGS: CTA bilaterally, normal respiratory effort   CARDIOVASCULAR: RRR, no murmurs.   No lower extremity edema  ABDOMEN: soft non-tender, active bowel sounds, no HSM. No palpable masses  EXTREMITIES: warm, full ROM in all 4 extremities, no focal weakness. SKIN: warm, dry with no rashes or lesions  LYMPH: No cervical, clavicular, axillary, or inguinal lymphadenopathy  NEUROLOGIC: follows commands, non focal   PSYCH: mood and affect appropriate. Alert and oriented to time, place, person      LABORATORY RESULTS REVIEWED/ANALYZED BY ME:  Lab Results   Component Value Date    WBC 5.20 01/25/2023    HGB 13.0 01/25/2023    HCT 41.7 01/25/2023    MCV 77.7 (L) 01/25/2023     01/25/2023     Lab Results   Component Value Date    NEUTROABS 2.75 01/25/2023       RADIOLOGY STUDIES REVIEWED BY ME:  CT CHEST W CONTRAST    Result Date: 1/22/2023  NO PRIOR REPORT AVAILABLE Exam: CT OF THE CHEST WITH CONTRAST Clinical data: Malignant neoplasm of lower-inner quadrant of right breast of female, estrogen receptor positive ([facility] [address] Technique: Axial CT images through the lungs were acquired with contrast and imaged using soft tissue and lung algorithms. Reformatted/MPR images were performed. Contrast: Isovue 370 Amount: 75mL Radiation dose: CTDIvol =8.62 mGy, DLP =441.64 mGy x cm. Prior studies: No prior studies submitted. Findings: Lungs: Multiple small subpleural nodules are seen in the right upper lung field, the largest measures 4 mm in the posterior segment of right upper lobe on series 4 image 42. Scattered streaky, linear and hazy opacities are seen in both lungs predominantly in both lung bases in subpleural distribution which is consistent with non-specific dependent changes, scarring or subsegmental atelectasis. No pulmonary infiltrate identified. No pulmonary mass identified. No pleural effusions identified. No pneumothorax. The airway is clear. Soft Tissues: There are lobulated mass lesions with punctate calcifications seen in the right breast which measures 3.1 x 2.4 cm and 2.0 x 1.7 cm respectively, image 42-59, series 10. . Small hiatal hernia. Scattered small to mildly enlarged nonspecific intrathoracic lymph nodes. No significant axillary adenopathy bilaterally. Vascular: Mild scattered atherosclerotic calcification is seen in the thoracic aorta. Unremarkable pulmonary vascularity. Grossly unremarkable sized heart. Bony structures: No acute or destructive abnormality. Multilevel spondylosis and some scoliosis of the spine. Neck: A heterogenous hypodense nodule is seen in the left lobe of the thyroid gland which measures 3.3 x 2.8 cm. So, cannot exclude a small right thyroid nodule. Upper Abdomen: as described in separate report on CT abdomen. 1. Multiple small subpleural nodules in the right upper lung field of indeterminate significance. Scattered borderline nonspecific intrathoracic adenopathy, as above. Suggest a follow-up chest CT in 3 months, and/or comparison with priors, if clinically appropriate. 2. Lobulated mass lesions with punctate calcifications in the right breast. These are consistent with known malignant neoplasm of the right breast. Consider correlation with dedicated ultrasound and mammography, if clinically appropriate. 3. A heterogenous hypodense nodule in the left lobe of the thyroid gland, and cannot exclude a small right thyroid nodule, as above, for which correlation with ultrasound is recommended. 4. Other findings, as above. Please see comments above. Recommendation: As above. All CT scans at this facility utilize dose modulation, iterative reconstruction, and/or weight based dosing when appropriate to reduce radiation dose to as low as reasonably achievable. Dictated and Electronically Signed by Concepcion Reich MD at 22-Jan-2023 03:35:17 AM EST             CT ABDOMEN PELVIS W IV CONTRAST Additional Contrast? Oral    Result Date: 1/22/2023  NO PRIOR REPORT AVAILABLE Exam: CT OF THE ABDOMEN/PELVIS WITH IV AND ORAL CONTRAST Clinical data:  Malignant neoplasm of lower-inner quadrant of right breast of female, estrogen receptor positive ([facility] [address] Technique: Direct contiguous axial CT images were acquired through the abdomen and pelvis with intravenouscontrastusing soft tissue and bone algorithms. Oral contrast was administered. IV type given: Isovue 370. Amount given: 75mL. Reformatted/MPR images  were performed. Radiation dose: CTDIvol =8.62 mGy, DLP =441.64 mGy x cm. Limitations: None. Prior studies: No prior studies submitted. Findings: Lung bases: as described in the separate chest report. Liver:Unremarkable size andcontour. Normal density. No evidence of mass. No evidence of dilated ducts. Gallbladder Fossa : Unremarkable Spleen: Grossly unremarkable. Pancreas/adrenal glands: Grossly unremarkable size, contour and density. Kidneys: In anatomic position. Grossly unremarkablerenal size, contour and density. No renal or ureteral calculi. No evidence of a renal mass or cyst. Perinephric space is unremarkable. Retroperitoneum: No enlarged retroperitoneal lymphadenopathy. Scattered mild atherosclerotic calcification is seen in the abdominal aorta. The IVC appear unremarkable. Peritoneal cavity: No evidence of free air or ascites. Gastrointestinal tract: Small sliding hiatal hernia is seen. Mild thickening versus an element of underdistension is seen in the cecum and ascending colon. This could reflect an element of colitis due to infectious / inflammatory process. No obstruction. Appendix: Unremarkable Pelvis: Solid and hollow viscera grossly unremarkable. Osseous structures: Mild dextroscoliosis of the lumbar spine is seen. Spondylotic changes are seen in the visualized spine. No acute or destructive bony process identified. 1. Small sliding hiatal hernia is seen. Mild thickening versus an element of underdistension is seen in the cecum and ascending colon. This could reflect an element of colitis due to infectious / inflammatory process. 2. No metastatic lesions are seen. No ascites. 3.Details as above. Recommendation: Follow up as clinically indicated. All CT scans at this facility utilize dose modulation, iterative reconstruction, and/or weight based dosing when appropriate to reduce radiation dose to as low as reasonably achievable. Dictated and Electronically Signed by Parvin Garcia MD at 22-Jan-2023 02:55:15 AM EST             MRI BREAST BILATERAL W WO CONTRAST    Result Date: 1/12/2023  EXAM: MRI BREAST BILATERAL W WO CONTRAST -- 1/11/2023 8:45 AM INDICATION: New breast cancer. Evaluate extent of disease. HISTORY: 75 years, Female, C50.211. Surgical pathology report 12/19/2022 \"RIGHT breast 1:00 core biopsies: Invasive carcinoma of no special type (ductal). RIGHT breast 4:00 core biopsy: Invasive carcinoma of no special type (ductal). \" Cytology report 12/19/2022 RIGHT axillary lymph node fine-needle aspiration negative for malignant cells. Initial staging. Patient reports bilateral breast tenderness. COMPARISON: 12/19/2022, 12/8/2022, 8/9/2021 FAMILY HISTORY OF BREAST CANCER: None reported TECHNIQUE: Routine protocol MRI performed of the breasts without and with intravenous contrast. Post processing performed on a separate workstation. FINDINGS: Amount of Fibroglandular Tissue: Heterogeneous fibroglandular tissue. Background Parenchymal Enhancement:  Mild. RIGHT breast with a 4.5 x 2.8 cm (AP by transverse) bilobed or 2 closely adjacent round masses with partially spiculated margins, heterogeneous enhancement rapid washout kinetics with associated nonmass enhancement measuring 7.4 x 3.8 cm (AP by transverse) in the middle third, upper inner quadrant, 1:00 position, anterior aspect 3.8 cm from the nipple and posterior aspect 1.8 cm from the chest wall. This correlates with biopsy-proven malignancy and has biopsy marker at the posterior aspect of the mass. Nonmass enhancement extends to and appears to involve the medial skin surface and apparent subtle skin thickening, concerning for invasion.  Prior RIGHT breast ultrasound demonstrated two separate correlating masses measuring 2.3 x 2.0 x 2.1 cm and 1.6 x 1.9 x 1.1 cm on 12/8/2022, which are measured in aggregate on today's MRI. RIGHT breast with a 1.2 x 1.1 cm round circumscribed mass (on postcontrast series 500, image 55) with rapid washout kinetics situated in nonmass enhancement (on postcontrast subtraction images) measuring up to 1.8 cm in the far posterior third, lower inner quadrant, 4:00 position, 0.4 cm from the chest wall. This correlates with biopsy-proven malignancy and has adjacent biopsy marker. Prior ultrasound measured a correlating mass previously measured 1.2 x 1.3 x 0.9 cm on 12/8/2022. RIGHT breast with a 0.8 cm round homogeneously enhancing with plateau and persisting kinetics in the posterior third, slightly upper outer quadrant, 11:00 position, 2 cm lateral to the above-described dominant RIGHT upper inner quadrant mass, concerning for satellite lesion. No focally suspicious finding in the LEFT breast. There are 2 slightly asymmetric low-lying RIGHT axillary lymph nodes on postcontrast axial series 500, images 79 and 94, which are indeterminant. Prior RIGHT axillary ultrasound 12/8/2022 demonstrates a lymph node with nodular cortical thickening, which is favored to correlate with the lymph node on axial image 79. Appearance is concerning for willy disease. No LEFT axillary adenopathy. No internal mammary adenopathy. Enlarged LEFT thyroid which appears to have a dominant nodule measuring 3 cm. Otherwise the visualized portion of the chest and abdomen appears grossly within normal limits, not optimally assessed on this exam. No focally suspicious bony finding. 1. RIGHT breast upper inner quadrant dominant mass, consistent with biopsy proven malignancy. There is nonmass enhancement extending to the medial skin, which appears to have subtle thickening, concerning for invasion.  2. RIGHT breast lower inner quadrant 4:00 position mass, consistent with biopsy proven malignancy. Note that the posterior aspect of the mass is 0.4 cm from the chest wall. 3. RIGHT breast upper outer quadrant with a 0.8 cm circumscribed mass, concerning for satellite disease. 4. Overall appearance of the RIGHT breast concerning for multicentric disease. 5. Suspicious low-lying RIGHT axillary lymph node as above. Although this was negative by fine-needle aspiration, the imaging appearance remains suspicious. 6. No internal mammary or LEFT axillary adenopathy. 7. LEFT 3 cm thyroid nodule, not optimally evaluated by MRI. If not previously performed, consider nonemergent thyroid ultrasound. BI-RADS Final Assessment Category 6: Known Biopsy-Proven Malignancy Management Recommendation: Surgical excision when clinically appropriate. Signed by Dr Sheela Calhoun    Result Date: 1/23/2023  NO PRIOR REPORT AVAILABLE EXAM: WHOLE-BODY BONE SCAN CLINICAL DATA:Malignant neoplasm of lower-inner quadrant of right breast of female, estrogen receptor positive. TECHNIQUE:20.6 mCi of technetium 99m MDP was injected intravenously and whole body planar images were obtained. PRIOR STUDIES: No prior studies submitted. FINDINGS: Study demonstrates normal tracer uptake by both appendicularand axial skeleton. No activity is noted to suggest metastatic disease. There is no evidence of osseous metastatic disease. RECOMMENDATION: Follow up as clinically indicated. Dictated and Electronically Signed by Albino Guevara MD at 23-Jan-2023 08:43:43 AM EST                    ASSESSMENT:    Orders Placed This Encounter   Procedures    US THYROID     This procedure can be scheduled via [employer]. Access your [employer] account by visiting [email or web address].      Standing Status:   Future     Standing Expiration Date:   1/25/2024     Scheduling Instructions:      Sched at [facility]     Order Specific Question:   Reason for exam:     Answer:   ASSESS THYROID NODULE FOUND ON L CT CHEST    TSH Standing Status:   Future     Number of Occurrences:   1     Standing Expiration Date:   1/25/2024    T4, Free     Standing Status:   Future     Number of Occurrences:   1     Standing Expiration Date:   1/25/2024    Comprehensive Metabolic Panel     Standing Status:   Future     Number of Occurrences:   1     Standing Expiration Date:   1/25/2024    External Referral To ENT     Referral Priority:   Urgent     Referral Type:   Eval and Treat     Referral Reason:   Specialty Services Required     Referred to Provider:   Melissa Leonardo MD     Requested Specialty:   Otolaryngology     Number of Visits Requested:   June Castillo was seen today for new patient. Diagnoses and all orders for this visit:    Encounter for preventive care  -     ondansetron (ZOFRAN) 4 MG tablet; Take 1 tablet by mouth every 6 hours as needed for Nausea or Vomiting  -     promethazine (PHENERGAN) 25 MG tablet; Take 0.5 tablets by mouth every 6 hours as needed for Nausea  -     OLANZapine (ZYPREXA) 10 MG tablet; Take 1 tablet at night x4 nights with each cycle chemo every 2 weeks x4 cycles    Thyroid nodule  -     US THYROID; Future  -     External Referral To ENT  -     TSH; Future  -     T4, Free; Future    Malignant neoplasm of upper-inner quadrant of right breast in female, estrogen receptor positive ([facility] [address]  -     Comprehensive Metabolic Panel; Future    Malignant neoplasm of lower-inner quadrant of right breast of female, estrogen receptor positive ([facility] [address]  -     Comprehensive Metabolic Panel;  Future    Care plan discussed with patient    Pulmonary nodules       Clinical T3 N0 M0 invasive ductal carcinoma, 1:00 right breast, Grade 3, ER 99%, SC 0, HER-2 2+/equivocal, FISH-negative, Ki67 55%, MammaPrint: Luminal B/high risk, Dec 2022  Invasive ductal carcinoma, 4:00 right breast, Grade 2, ER 96%, %, HER-2 2+/equivocal, FISH-negative, Ki67 15%, MammaPrint: Luminal B/high risk, Dec 2022  The patient was counseled today about diagnosis, staging, prognosis, diagnostic tests, medications, side effects and disease management. Essentially, local advanced disease. Recommended neoadjuvant chemotherapy dose dense AC with G-CSF support followed by 12 weekly cycles of Taxol    Chemotherapy toxicity education-I have explained to the patient the possible side effects of chemotherapy to include but not limited to bone marrow suppression, increased risk of infections, alopecia, GI toxicity such as nausea, vomiting, diarrhea, constipation, allergic reactions, skin rashes, fatigue and rarely risk of fatal outcomes related to direct or indirect effects of treatment. We discussed about strategies to lessen the side effects of treatment when required to include but not limited to doses/regimen modifications by provider, increased patient education awareness of certain toxicities, prompt notification to provider or nursing staff by the patient all certain side effects, proactive measures etc.  We also discussed about the importance of compliance with treatment with providers visits to assure early identification and management of side effects. Genetic assessment  1/18/22 [employer] gene genetic panel: Negative for pathogenic mutations; VUS BRIP1    Thyroid nodule-TSH, free T4 and ultrasound thyroid.   Refer to ENT    Pulmonary nodules-repeat CT chest 4-month, around May 2023      PLAN:  RTC with MD in treatment room C# 2  CBC, TSH, T4 today  Recommend dose dense Adriamycin Cyclophosphamide + GCSF every 2 weeks x4 cycles followed by weekly Taxol x12 weeks-once ins approves  Patient education given  Treatment consent signed  Recommend Zofran 4mg every 6 hrs as needed-script sent  Recommend Phenergan 12.5mg every 6 hrs as needed-script sent  Recommend Olanzapine 10mg nightly x4 nights with each cycle dose dense Kane Cytoxan-script sent  Recommend cold gloves/socks with weekly Taxol  [facility] thyroid at [facility]  Refer to Dr Ap Kim/[facility] for thyroid nodule  Repeat CT chest 3 months, May 2023  Continue follow-up with Dr Leola Mosqueda, analilia pre-charting as a registered nurse for Junior Beckwith MD. Electronically signed by Daisy Toney RN on 1/25/2023 at 4:44 PM CST. Brody Thornton, analilia scribing for Junior Beckwith MD. Electronically signed by Daisy Toney RN on 1/25/2023 at 2:41 PM CST. I, Dr Emily Delatorre, personally performed the services described in this documentation as scribed by Daisy Toney RN in my presence and is both accurate and complete. I have seen, examined and reviewed this patient medication list, appropriate labs and imaging studies. I reviewed relevant medical records and others physicians notes. I discussed the plans of care with the patient. I answered all the questions to the patients satisfaction. I have also reviewed the chief complaint (CC) and part of the history (History of Present Illness (HPI), Past Family Social History [facility]), or Review of Systems (ROS) and made changes when appropriated. (Please note that portions of this note were completed with a voice recognition program. Efforts were made to edit the dictations but occasionally words are mis-transcribed. )Electronically signed by Junior Beckwith MD on 1/25/2023 at 4:28 PM

## 2023-01-20 NOTE — TELEPHONE ENCOUNTER
Patient's case was presented and discussed at tumor board today. Multidisciplinary oncology teams present including oncologist, radiologist, pathologist, general surgery and gastroenterology.

## 2023-01-24 DIAGNOSIS — C50.911 INVASIVE DUCTAL CARCINOMA OF BREAST, FEMALE, RIGHT (HCC): Primary | ICD-10-CM

## 2023-01-25 ENCOUNTER — OFFICE VISIT (OUTPATIENT)
Dept: HEMATOLOGY | Age: 68
End: 2023-01-25
Payer: MEDICARE

## 2023-01-25 ENCOUNTER — TRANSCRIBE ORDERS (OUTPATIENT)
Dept: ADMINISTRATIVE | Facility: HOSPITAL | Age: 68
End: 2023-01-25
Payer: MEDICARE

## 2023-01-25 ENCOUNTER — HOSPITAL ENCOUNTER (OUTPATIENT)
Dept: INFUSION THERAPY | Age: 68
Discharge: HOME | End: 2023-01-25
Payer: MEDICARE

## 2023-01-25 VITALS
OXYGEN SATURATION: 96 % | HEART RATE: 72 BPM | HEIGHT: 68 IN | TEMPERATURE: 98.3 F | BODY MASS INDEX: 27.32 KG/M2 | WEIGHT: 180.3 LBS | SYSTOLIC BLOOD PRESSURE: 130 MMHG | DIASTOLIC BLOOD PRESSURE: 74 MMHG

## 2023-01-25 DIAGNOSIS — Z17.0 MALIGNANT NEOPLASM OF UPPER-INNER QUADRANT OF RIGHT BREAST IN FEMALE, ESTROGEN RECEPTOR POSITIVE (HCC): ICD-10-CM

## 2023-01-25 DIAGNOSIS — Z00.00 ENCOUNTER FOR PREVENTIVE CARE: Primary | ICD-10-CM

## 2023-01-25 DIAGNOSIS — C50.311 MALIGNANT NEOPLASM OF LOWER-INNER QUADRANT OF RIGHT BREAST OF FEMALE, ESTROGEN RECEPTOR POSITIVE (HCC): ICD-10-CM

## 2023-01-25 DIAGNOSIS — C50.211 MALIGNANT NEOPLASM OF UPPER-INNER QUADRANT OF RIGHT BREAST IN FEMALE, ESTROGEN RECEPTOR POSITIVE (HCC): ICD-10-CM

## 2023-01-25 DIAGNOSIS — R91.8 PULMONARY NODULES: ICD-10-CM

## 2023-01-25 DIAGNOSIS — E04.1 THYROID NODULE: Primary | ICD-10-CM

## 2023-01-25 DIAGNOSIS — Z17.0 MALIGNANT NEOPLASM OF LOWER-INNER QUADRANT OF RIGHT BREAST OF FEMALE, ESTROGEN RECEPTOR POSITIVE (HCC): ICD-10-CM

## 2023-01-25 DIAGNOSIS — E04.1 THYROID NODULE: ICD-10-CM

## 2023-01-25 DIAGNOSIS — Z71.89 CARE PLAN DISCUSSED WITH PATIENT: ICD-10-CM

## 2023-01-25 DIAGNOSIS — C50.911 INVASIVE DUCTAL CARCINOMA OF BREAST, FEMALE, RIGHT (HCC): ICD-10-CM

## 2023-01-25 LAB
ALBUMIN SERPL-MCNC: 4.3 G/DL (ref 3.5–5.2)
ALP BLD-CCNC: 74 U/L (ref 35–104)
ALT SERPL-CCNC: 17 U/L (ref 5–33)
ANION GAP SERPL CALCULATED.3IONS-SCNC: 12 MMOL/L (ref 7–19)
AST SERPL-CCNC: 17 U/L (ref 5–32)
BASOPHILS ABSOLUTE: 0.06 K/UL (ref 0.01–0.08)
BASOPHILS RELATIVE PERCENT: 1.2 % (ref 0.1–1.2)
BILIRUB SERPL-MCNC: 0.4 MG/DL (ref 0.2–1.2)
BUN BLDV-MCNC: 17 MG/DL (ref 8–23)
CALCIUM SERPL-MCNC: 9.7 MG/DL (ref 8.8–10.2)
CHLORIDE BLD-SCNC: 102 MMOL/L (ref 98–111)
CO2: 29 MMOL/L (ref 22–29)
CREAT SERPL-MCNC: 0.6 MG/DL (ref 0.5–0.9)
EOSINOPHILS ABSOLUTE: 0.16 K/UL (ref 0.04–0.54)
EOSINOPHILS RELATIVE PERCENT: 3.1 % (ref 0.7–7)
GFR SERPL CREATININE-BSD FRML MDRD: >60 ML/MIN/{1.73_M2}
GLUCOSE BLD-MCNC: 95 MG/DL (ref 74–109)
HCT VFR BLD CALC: 41.7 % (ref 34.1–44.9)
HEMOGLOBIN: 13 G/DL (ref 11.2–15.7)
LYMPHOCYTES ABSOLUTE: 1.78 K/UL (ref 1.18–3.74)
LYMPHOCYTES RELATIVE PERCENT: 34.2 % (ref 19.3–53.1)
MCH RBC QN AUTO: 24.2 PG (ref 25.6–32.2)
MCHC RBC AUTO-ENTMCNC: 31.2 G/DL (ref 32.3–35.5)
MCV RBC AUTO: 77.7 FL (ref 79.4–94.8)
MONOCYTES ABSOLUTE: 0.44 K/UL (ref 0.24–0.82)
MONOCYTES RELATIVE PERCENT: 8.5 % (ref 4.7–12.5)
NEUTROPHILS ABSOLUTE: 2.75 K/UL (ref 1.56–6.13)
NEUTROPHILS RELATIVE PERCENT: 52.8 % (ref 34–71.1)
PDW BLD-RTO: 14.3 % (ref 11.7–14.4)
PLATELET # BLD: 219 K/UL (ref 182–369)
PMV BLD AUTO: 9.8 FL (ref 7.4–10.4)
POTASSIUM SERPL-SCNC: 4.8 MMOL/L (ref 3.5–5)
RBC # BLD: 5.37 M/UL (ref 3.93–5.22)
SODIUM BLD-SCNC: 143 MMOL/L (ref 136–145)
T4 FREE: 1.09 NG/DL (ref 0.93–1.7)
TOTAL PROTEIN: 6.7 G/DL (ref 6.6–8.7)
TSH SERPL DL<=0.05 MIU/L-ACNC: 1.41 UIU/ML (ref 0.27–4.2)
WBC # BLD: 5.2 K/UL (ref 3.98–10.04)

## 2023-01-25 PROCEDURE — 1123F ACP DISCUSS/DSCN MKR DOCD: CPT | Performed by: INTERNAL MEDICINE

## 2023-01-25 PROCEDURE — 36415 COLL VENOUS BLD VENIPUNCTURE: CPT

## 2023-01-25 PROCEDURE — 99205 OFFICE O/P NEW HI 60 MIN: CPT | Performed by: INTERNAL MEDICINE

## 2023-01-25 PROCEDURE — 36415 COLL VENOUS BLD VENIPUNCTURE: CPT | Performed by: INTERNAL MEDICINE

## 2023-01-25 PROCEDURE — 85025 COMPLETE CBC W/AUTO DIFF WBC: CPT

## 2023-01-25 PROCEDURE — 99212 OFFICE O/P EST SF 10 MIN: CPT

## 2023-01-25 RX ORDER — ONDANSETRON 4 MG/1
4 TABLET, FILM COATED ORAL EVERY 6 HOURS PRN
Qty: 30 TABLET | Refills: 5 | Status: SHIPPED | OUTPATIENT
Start: 2023-01-25

## 2023-01-25 RX ORDER — OLANZAPINE 10 MG/1
TABLET ORAL
Qty: 16 TABLET | Refills: 0 | Status: SHIPPED | OUTPATIENT
Start: 2023-01-25

## 2023-01-25 RX ORDER — PROMETHAZINE HYDROCHLORIDE 25 MG/1
12.5 TABLET ORAL EVERY 6 HOURS PRN
Qty: 30 TABLET | Refills: 5 | Status: SHIPPED | OUTPATIENT
Start: 2023-01-25

## 2023-01-25 ASSESSMENT — PROMIS GLOBAL HEALTH SCALE
TO WHAT EXTENT ARE YOU ABLE TO CARRY OUT YOUR EVERYDAY PHYSICAL ACTIVITIES SUCH AS WALKING, CLIMBING STAIRS, CARRYING GROCERIES, OR MOVING A CHAIR [ON A SCALE OF 1 (NOT AT ALL) TO 5 (COMPLETELY)]?: 5
IN GENERAL, WOULD YOU SAY YOUR HEALTH IS...[ON A SCALE OF 1 (POOR) TO 5 (EXCELLENT)]: 3
IN GENERAL, HOW WOULD YOU RATE YOUR MENTAL HEALTH, INCLUDING YOUR MOOD AND YOUR ABILITY TO THINK [ON A SCALE OF 1 (POOR) TO 5 (EXCELLENT)]?: 4
SUM OF RESPONSES TO QUESTIONS 2, 4, 5, & 10: 15
IN THE PAST 7 DAYS, HOW WOULD YOU RATE YOUR FATIGUE ON AVERAGE [ON A SCALE FROM 1 (NONE) TO 5 (VERY SEVERE)]?: 3
IN GENERAL, HOW WOULD YOU RATE YOUR PHYSICAL HEALTH [ON A SCALE OF 1 (POOR) TO 5 (EXCELLENT)]?: 4
IN GENERAL, WOULD YOU SAY YOUR QUALITY OF LIFE IS...[ON A SCALE OF 1 (POOR) TO 5 (EXCELLENT)]: 3
IN THE PAST 7 DAYS, HOW WOULD YOU RATE YOUR PAIN ON AVERAGE [ON A SCALE FROM 0 (NO PAIN) TO 10 (WORST IMAGINABLE PAIN)]?: 0
IN THE PAST 7 DAYS, HOW OFTEN HAVE YOU BEEN BOTHERED BY EMOTIONAL PROBLEMS, SUCH AS FEELING ANXIOUS, DEPRESSED, OR IRRITABLE [ON A SCALE FROM 1 (NEVER) TO 5 (ALWAYS)]?: 3
IN GENERAL, HOW WOULD YOU RATE YOUR SATISFACTION WITH YOUR SOCIAL ACTIVITIES AND RELATIONSHIPS [ON A SCALE OF 1 (POOR) TO 5 (EXCELLENT)]?: 5
IN GENERAL, PLEASE RATE HOW WELL YOU CARRY OUT YOUR USUAL SOCIAL ACTIVITIES (INCLUDES ACTIVITIES AT HOME, AT WORK, AND IN YOUR COMMUNITY, AND RESPONSIBILITIES AS A PARENT, CHILD, SPOUSE, EMPLOYEE, FRIEND, ETC) [ON A SCALE OF 1 (POOR) TO 5 (EXCELLENT)]?: 5
SUM OF RESPONSES TO QUESTIONS 3, 6, 7, & 8: 12

## 2023-01-30 ENCOUNTER — HOSPITAL ENCOUNTER (OUTPATIENT)
Dept: PREADMISSION TESTING | Age: 68
Discharge: HOME | End: 2023-02-03
Payer: MEDICARE

## 2023-01-30 ENCOUNTER — HOSPITAL ENCOUNTER (OUTPATIENT)
Dept: ULTRASOUND IMAGING | Facility: HOSPITAL | Age: 68
Discharge: HOME | End: 2023-01-30
Admitting: INTERNAL MEDICINE
Payer: MEDICARE

## 2023-01-30 VITALS — WEIGHT: 179 LBS | BODY MASS INDEX: 27.22 KG/M2

## 2023-01-30 LAB
EKG P AXIS: 14 DEGREES
EKG P-R INTERVAL: 234 MS
EKG Q-T INTERVAL: 404 MS
EKG QRS DURATION: 80 MS
EKG QTC CALCULATION (BAZETT): 418 MS
EKG T AXIS: 31 DEGREES

## 2023-01-30 PROCEDURE — 76536 US EXAM OF HEAD AND NECK: CPT

## 2023-01-30 PROCEDURE — 93005 ELECTROCARDIOGRAM TRACING: CPT | Performed by: SURGERY

## 2023-01-30 PROCEDURE — 93010 ELECTROCARDIOGRAM REPORT: CPT | Performed by: INTERNAL MEDICINE

## 2023-01-30 RX ORDER — GABAPENTIN 300 MG/1
300 CAPSULE ORAL ONCE
OUTPATIENT
Start: 2023-02-02

## 2023-01-30 RX ORDER — CELECOXIB 200 MG/1
200 CAPSULE ORAL ONCE
OUTPATIENT
Start: 2023-02-02

## 2023-01-30 RX ORDER — ACETAMINOPHEN 325 MG/1
975 TABLET ORAL ONCE
OUTPATIENT
Start: 2023-02-02

## 2023-01-30 NOTE — DISCHARGE INSTRUCTIONS
PREOPERATIVE GUIDELINES WHEN RECEIVING ANESTHESIA    Do not eat or drink anything after midnight, the night before your surgery. This is extremely important for your safety. Take a bath (or shower) the night before your surgery and you may brush your teeth the morning of your surgery. You will be scheduled to arrive at the hospital 2 hours before your surgery, or follow your surgeon's instructions. Dress comfortably. Wear loose clothing that will be easy to remove and comfortable for your trip home. You may wear eyeglasses or contacts but bring your cases with you as they must be remove before your surgery. Hearing aids and dentures will need to be removed before your surgery. Do not wear any jewelry, including body jewelry. All jewelry will need to be removed prior to your surgery. Do not wear fingernail polish or make-up. It is best not to bring any valuables with you. If you are to stay in the hospital overnight, bring your robe, slippers and personal toiletries that you may need. POSTOPERATIVE GUIDELINES AFTER RECEIVING ANESTHESIA    If you are to go home after your surgery, you will need a responsible adult to drive you home. You will not be able to take public transportation after your discharge from the Operative Care Unit unless you are accompanied by a        responsible adult. On returning home, be sure to follow your physician's orders regarding diet, activity and medications. Remember, surgery with general anesthesia or sedation may leave you sleepy, very tired and with a decreased appetite for 12 to 24 hours. If you develop any post-surgical complications or problems, call your surgeon or [facility] Emergency Department ([phone]). The day before surgery you will receive a phone call from the surgery nurse to let you know what time to arrive on the day of surgery.  This call will usually be between 2-4 PM. If you do not receive a phone call by 4 PM the day before your surgery please call [phone] and let them know you have not received an arrival time. If your surgery is on Monday, your call will be on the Friday before your Monday surgery. MEDICATION INSTRUCTIONS PRIOR TO YOUR SURGERY        The morning of surgery:  do not take lisinopril/hctz    You can take all your usual prescribed medications with a small sip of water. DO NOT TAKE ANY DIABETIC MEDICATIONS the morning of your surgery. DO NOT TAKE ANY SUPPLEMENTS or over the counter medications the morning of  surgery. Chlorhexidine Gluconate 4% Solution    Patient should shower with this soap a minimum of 2 consecutive showers (the night before surgery and the morning of surgery) washing from the neck down (avoiding contact with genitalia). DO NOT [address] YOUR HAIR OR FACE WITH THIS SOAP. When washing with this soap, apply enough to suds up the body thoroughly, turn the water away from your body and allow the soap suds to remain on the body for 2 full minutes, then rinse body completely. After using this soap on the body, please do not apply powders or lotions to your body. After the shower the night before surgery, please dry off with a clean towel, sleep in freshly laundered pj's, and change your bed linen before going to sleep. [facility] for Surgery Patients-Revised 6-    Visitors for surgery patients are essential for the patient's emotional well-being and care       post operatively. 2.   Visitor Expectations and Limitations           3. One visitor allowed with patients in the preop/postop rooms. 4.  A second visitor may sit in the waiting area. 5.  No children under 13 allowed in the pre-post op areas unless they are the patient. 6.  Two people may be with an underage surgical/procedural patient in preop/postop        room.       7.  If you are admitted to the hospital post operatively, there are NO RESTRICTIONS on the floor at this time. 8.  If you are admitted to ICU postoperatively, you may have one visitor in the room from        7A-7P. A second visitor may sit in the ICU waiting room. No overnight visitors in         ICU waiting room.

## 2023-02-02 ENCOUNTER — TELEPHONE (OUTPATIENT)
Dept: SURGERY | Age: 68
End: 2023-02-02

## 2023-02-02 DIAGNOSIS — I87.8 POOR VENOUS ACCESS: Primary | ICD-10-CM

## 2023-02-02 RX ORDER — LIDOCAINE AND PRILOCAINE 25; 25 MG/G; MG/G
CREAM TOPICAL
Qty: 30 G | Refills: 5 | Status: SHIPPED | OUTPATIENT
Start: 2023-02-02

## 2023-02-02 NOTE — TELEPHONE ENCOUNTER
Returned phone call to pt regarding questions of port use. Pt has requested to have Emla cream to be sent to pharmacy to help access her port. I have sent prescription to [facility] and after being signed it will be sent to pharmacy. I have explained the process and pt will proceed with port placement on 2/15 and chemotherapy 2/16.

## 2023-02-02 NOTE — TELEPHONE ENCOUNTER
2/2/23 Patient called and stated she would like to see about getting a script for Lidocaine & Prilocaine Cream prior to having port place. She heard that it can be helpful.     Callback # [phone]  amanda

## 2023-02-02 NOTE — TELEPHONE ENCOUNTER
Discussed with patient. We will defer to the oncologist if needed. This has been electronically signed by Cesia Barron.  Alfred Lima PA-C.

## 2023-02-06 ENCOUNTER — TELEPHONE (OUTPATIENT)
Dept: INFUSION THERAPY | Age: 68
End: 2023-02-06

## 2023-02-06 ENCOUNTER — TELEPHONE (OUTPATIENT)
Dept: SURGERY | Age: 68
End: 2023-02-06

## 2023-02-06 NOTE — TELEPHONE ENCOUNTER
Called and introduced myself to [employer] and went over her health benefits she has a 1,300.00 OOP max I asked if I could sign her up for co-pay assistance and she gave me permission to sign up and use her SS# if necessary. She will call me back with income information as soon as she talks with her .       [address] [facility]  [phone]

## 2023-02-06 NOTE — TELEPHONE ENCOUNTER
2/6/2023 Patient called and stated surgery was moved and she is needing nasal ointment for prior to surgery use called in to the pharmacy.     Callback # [phone]  amanda

## 2023-02-07 ENCOUNTER — OFFICE VISIT (OUTPATIENT)
Dept: OTOLARYNGOLOGY | Facility: CLINIC | Age: 68
End: 2023-02-07
Payer: MEDICARE

## 2023-02-07 VITALS
WEIGHT: 178 LBS | HEIGHT: 68 IN | HEART RATE: 76 BPM | SYSTOLIC BLOOD PRESSURE: 134 MMHG | TEMPERATURE: 98.7 F | DIASTOLIC BLOOD PRESSURE: 62 MMHG | BODY MASS INDEX: 26.98 KG/M2

## 2023-02-07 DIAGNOSIS — E04.1 THYROID NODULE: Primary | ICD-10-CM

## 2023-02-07 PROCEDURE — 99203 OFFICE O/P NEW LOW 30 MIN: CPT | Performed by: NURSE PRACTITIONER

## 2023-02-07 RX ORDER — LIDOCAINE AND PRILOCAINE 25; 25 MG/G; MG/G
CREAM TOPICAL
COMMUNITY
Start: 2023-02-02

## 2023-02-07 RX ORDER — PROMETHAZINE HYDROCHLORIDE 25 MG/1
TABLET ORAL
COMMUNITY
Start: 2023-01-25

## 2023-02-07 RX ORDER — ONDANSETRON 4 MG/1
4 TABLET, FILM COATED ORAL EVERY 6 HOURS PRN
COMMUNITY
Start: 2023-01-25

## 2023-02-07 RX ORDER — MONTELUKAST SODIUM 10 MG/1
TABLET ORAL
COMMUNITY
Start: 2023-02-06

## 2023-02-07 RX ORDER — OLANZAPINE 10 MG/1
TABLET ORAL
COMMUNITY
Start: 2023-01-26

## 2023-02-07 NOTE — PROGRESS NOTES
YOB: 1955  Location: [facility]  Location Address: [address] [place] [address], [facility], [address] [place] [address]  Location Phone: [phone]    Chief Complaint   Patient presents with   • Thyroid Problem       History of Present Illness  Karen Tipton is a 68 y.o. female.  Karen Tipton is here for evaluation of ENT complaints. The patient has had problems with thyroid nodule  Patient was recently diagnosed with breast cancer and ct chest showed thyroid nodule. She has not been on synthroid in the past and has not been diagnosed with thyroid nodule in the past.   She denies sore throat or difficulty swallowing. She states she had bronchitis in September and has noticed some hoarseness since that time.   She states at times with swallowing, she feels as if she took too large of a bite or drink and has to wait for things to go down.    She has a history of right breast invasive ductal carcinoma     Patient is not currently taking any blood thinners       US Thyroid (2023 10:02)  TSH (2023 16:34 EST)  T4, FREE (2023 16:34 EST)    Past Medical History:   Diagnosis Date   • Colon polyp    • Hypertension        Past Surgical History:   Procedure Laterality Date   • BREAST BIOPSY     • COLONOSCOPY  06/10/2014    3 polyps destroyed   • COLONOSCOPY N/A 2019    Procedure: COLONOSCOPY WITH ANESTHESIA;  Surgeon: Ismael Ramachandran MD;  Location: [facility] ENDOSCOPY;  Service: Gastroenterology       Outpatient Medications Marked as Taking for the 23 encounter (Office Visit) with Raegan Cheema APRN   Medication Sig Dispense Refill   • Cholecalciferol (VITAMIN D PO) Take  by mouth Daily.     • lisinopril-hydrochlorothiazide (PRINZIDE,ZESTORETIC) 10-12.5 MG per tablet Take 1 tablet by mouth once daily 90 tablet 0   • Multiple Vitamin (MULTI-VITAMIN DAILY PO) Take  by mouth.         Patient has no known allergies.    Family History   Problem Relation Age of Onset   • Stroke Mother    • Dementia Mother     • Cancer Father         protate   • Colon cancer Paternal Grandfather        Social History     Socioeconomic History   • Marital status:    Tobacco Use   • Smoking status: Never   • Smokeless tobacco: Never   Vaping Use   • Vaping Use: Never used   Substance and Sexual Activity   • Alcohol use: Yes     Comment: rare   • Drug use: No   • Sexual activity: Defer       Review of Systems   Constitutional: Negative.    HENT: Positive for voice change. Negative for sore throat and trouble swallowing.        Vitals:    02/07/23 1011   BP: 134/62   Pulse: 76   Temp: 98.7 °F (37.1 °C)       Body mass index is 27.06 kg/m².    Objective     Physical Exam  Vitals reviewed.   Constitutional:       Appearance: Normal appearance. She is normal weight.   HENT:      Head: Normocephalic.      Right Ear: External ear normal. There is impacted cerumen.      Left Ear: External ear normal. There is impacted cerumen.      Nose: Nose normal.      Mouth/Throat:      Lips: Pink.      Mouth: Mucous membranes are moist.      Pharynx: Uvula midline.   Neck:      Thyroid: No thyromegaly.      Comments: No overt thyromegaly   Palpable left thyroid nodule     Musculoskeletal:      Cervical back: Full passive range of motion without pain.   Neurological:      Mental Status: She is alert.         Assessment & Plan   Diagnoses and all orders for this visit:    1. Thyroid nodule (Primary)  -     US Thyroid; Future  -     US Guided Thyroid Biopsy; Future    Other orders  -     Fine Needle Aspiration; Standing      * Surgery not found *  Orders Placed This Encounter   Procedures   • US Thyroid     Standing Status:   Future     Standing Expiration Date:   2/7/2024     Order Specific Question:   Reason for Exam:     Answer:   Thyroid disease   • US Guided Thyroid Biopsy     Standing Status:   Future     Standing Expiration Date:   2/7/2024     Order Specific Question:   Reason for Exam:     Answer:   left thyroid nodule     Return in about 3  months (around 5/7/2023).     Risks vs benefits of fine needle aspiration discussed  Patient wishes to proceed      Patient Instructions   The patient has a thyroid nodule. I explained the pathology of thyroid nodules including the risks of cancer. The options of surgery versus an observational course were discussed. Recommendation was made for a FINE NEEDLE ASPIRATION of the nodule/mass

## 2023-02-08 DIAGNOSIS — Z17.0 MALIGNANT NEOPLASM OF LOWER-INNER QUADRANT OF RIGHT BREAST OF FEMALE, ESTROGEN RECEPTOR POSITIVE (HCC): Primary | ICD-10-CM

## 2023-02-08 DIAGNOSIS — C50.311 MALIGNANT NEOPLASM OF LOWER-INNER QUADRANT OF RIGHT BREAST OF FEMALE, ESTROGEN RECEPTOR POSITIVE (HCC): Primary | ICD-10-CM

## 2023-02-14 ENCOUNTER — ANESTHESIA EVENT (OUTPATIENT)
Dept: OPERATING ROOM | Age: 68
End: 2023-02-14
Payer: MEDICARE

## 2023-02-14 ENCOUNTER — APPOINTMENT (OUTPATIENT)
Dept: GENERAL RADIOLOGY | Age: 68
End: 2023-02-14
Attending: SURGERY
Payer: MEDICARE

## 2023-02-14 ENCOUNTER — HOSPITAL ENCOUNTER (OUTPATIENT)
Age: 68
Setting detail: OUTPATIENT SURGERY
Discharge: HOME | End: 2023-02-14
Attending: SURGERY | Admitting: SURGERY
Payer: MEDICARE

## 2023-02-14 ENCOUNTER — ANESTHESIA (OUTPATIENT)
Dept: OPERATING ROOM | Age: 68
End: 2023-02-14
Payer: MEDICARE

## 2023-02-14 VITALS
HEIGHT: 68 IN | TEMPERATURE: 97.4 F | BODY MASS INDEX: 27.13 KG/M2 | OXYGEN SATURATION: 98 % | HEART RATE: 62 BPM | DIASTOLIC BLOOD PRESSURE: 60 MMHG | SYSTOLIC BLOOD PRESSURE: 111 MMHG | WEIGHT: 179 LBS | RESPIRATION RATE: 20 BRPM

## 2023-02-14 PROCEDURE — 71045 X-RAY EXAM CHEST 1 VIEW: CPT

## 2023-02-14 PROCEDURE — 2500000003 HC RX 250 WO HCPCS: Performed by: SURGERY

## 2023-02-14 PROCEDURE — 6360000002 HC RX W HCPCS: Performed by: NURSE ANESTHETIST, CERTIFIED REGISTERED

## 2023-02-14 PROCEDURE — 2580000003 HC RX 258: Performed by: SURGERY

## 2023-02-14 PROCEDURE — 7100000000 HC PACU RECOVERY - FIRST 15 MIN: Performed by: SURGERY

## 2023-02-14 PROCEDURE — 3700000001 HC ADD 15 MINUTES (ANESTHESIA): Performed by: SURGERY

## 2023-02-14 PROCEDURE — 7100000001 HC PACU RECOVERY - ADDTL 15 MIN: Performed by: SURGERY

## 2023-02-14 PROCEDURE — 6370000000 HC RX 637 (ALT 250 FOR IP): Performed by: SURGERY

## 2023-02-14 PROCEDURE — 3600000013 HC SURGERY LEVEL 3 ADDTL 15MIN: Performed by: SURGERY

## 2023-02-14 PROCEDURE — 7100000010 HC PHASE II RECOVERY - FIRST 15 MIN: Performed by: SURGERY

## 2023-02-14 PROCEDURE — 6360000002 HC RX W HCPCS: Performed by: SURGERY

## 2023-02-14 PROCEDURE — 2580000003 HC RX 258: Performed by: NURSE ANESTHETIST, CERTIFIED REGISTERED

## 2023-02-14 PROCEDURE — 3700000000 HC ANESTHESIA ATTENDED CARE: Performed by: SURGERY

## 2023-02-14 PROCEDURE — 2709999900 HC NON-CHARGEABLE SUPPLY: Performed by: SURGERY

## 2023-02-14 PROCEDURE — 3600000003 HC SURGERY LEVEL 3 BASE: Performed by: SURGERY

## 2023-02-14 PROCEDURE — A4217 STERILE WATER/SALINE, 500 ML: HCPCS | Performed by: SURGERY

## 2023-02-14 PROCEDURE — 2580000003 HC RX 258: Performed by: ANESTHESIOLOGY

## 2023-02-14 PROCEDURE — 6360000002 HC RX W HCPCS: Performed by: ANESTHESIOLOGY

## 2023-02-14 PROCEDURE — 7100000011 HC PHASE II RECOVERY - ADDTL 15 MIN: Performed by: SURGERY

## 2023-02-14 RX ORDER — FENTANYL CITRATE 50 UG/ML
50 INJECTION, SOLUTION INTRAMUSCULAR; INTRAVENOUS
Status: DISCONTINUED | OUTPATIENT
Start: 2023-02-14 | End: 2023-02-14 | Stop reason: HOSPADM

## 2023-02-14 RX ORDER — ONDANSETRON 2 MG/ML
4 INJECTION INTRAMUSCULAR; INTRAVENOUS
Status: DISCONTINUED | OUTPATIENT
Start: 2023-02-14 | End: 2023-02-14 | Stop reason: HOSPADM

## 2023-02-14 RX ORDER — SODIUM CHLORIDE 0.9 % (FLUSH) 0.9 %
5-40 SYRINGE (ML) INJECTION EVERY 12 HOURS SCHEDULED
Status: DISCONTINUED | OUTPATIENT
Start: 2023-02-14 | End: 2023-02-14 | Stop reason: HOSPADM

## 2023-02-14 RX ORDER — LIDOCAINE HYDROCHLORIDE 10 MG/ML
1 INJECTION, SOLUTION EPIDURAL; INFILTRATION; INTRACAUDAL; PERINEURAL
Status: DISCONTINUED | OUTPATIENT
Start: 2023-02-14 | End: 2023-02-14 | Stop reason: HOSPADM

## 2023-02-14 RX ORDER — MIDAZOLAM HYDROCHLORIDE 1 MG/ML
INJECTION INTRAMUSCULAR; INTRAVENOUS PRN
Status: DISCONTINUED | OUTPATIENT
Start: 2023-02-14 | End: 2023-02-14 | Stop reason: SDUPTHER

## 2023-02-14 RX ORDER — MEPERIDINE HYDROCHLORIDE 25 MG/ML
12.5 INJECTION INTRAMUSCULAR; INTRAVENOUS; SUBCUTANEOUS EVERY 5 MIN PRN
Status: DISCONTINUED | OUTPATIENT
Start: 2023-02-14 | End: 2023-02-14 | Stop reason: HOSPADM

## 2023-02-14 RX ORDER — FENTANYL CITRATE 50 UG/ML
25 INJECTION, SOLUTION INTRAMUSCULAR; INTRAVENOUS EVERY 5 MIN PRN
Status: DISCONTINUED | OUTPATIENT
Start: 2023-02-14 | End: 2023-02-14 | Stop reason: HOSPADM

## 2023-02-14 RX ORDER — FENTANYL CITRATE 50 UG/ML
50 INJECTION, SOLUTION INTRAMUSCULAR; INTRAVENOUS EVERY 5 MIN PRN
Status: DISCONTINUED | OUTPATIENT
Start: 2023-02-14 | End: 2023-02-14 | Stop reason: HOSPADM

## 2023-02-14 RX ORDER — FENTANYL CITRATE 50 UG/ML
INJECTION, SOLUTION INTRAMUSCULAR; INTRAVENOUS PRN
Status: DISCONTINUED | OUTPATIENT
Start: 2023-02-14 | End: 2023-02-14 | Stop reason: SDUPTHER

## 2023-02-14 RX ORDER — MIDAZOLAM HYDROCHLORIDE 2 MG/2ML
2 INJECTION, SOLUTION INTRAMUSCULAR; INTRAVENOUS
Status: DISCONTINUED | OUTPATIENT
Start: 2023-02-14 | End: 2023-02-14 | Stop reason: HOSPADM

## 2023-02-14 RX ORDER — DIPHENHYDRAMINE HYDROCHLORIDE 50 MG/ML
12.5 INJECTION INTRAMUSCULAR; INTRAVENOUS
Status: DISCONTINUED | OUTPATIENT
Start: 2023-02-14 | End: 2023-02-14 | Stop reason: HOSPADM

## 2023-02-14 RX ORDER — CELECOXIB 200 MG/1
200 CAPSULE ORAL ONCE
Status: COMPLETED | OUTPATIENT
Start: 2023-02-14 | End: 2023-02-14

## 2023-02-14 RX ORDER — SODIUM CHLORIDE 9 MG/ML
INJECTION, SOLUTION INTRAVENOUS PRN
Status: DISCONTINUED | OUTPATIENT
Start: 2023-02-14 | End: 2023-02-14 | Stop reason: HOSPADM

## 2023-02-14 RX ORDER — PROCHLORPERAZINE EDISYLATE 5 MG/ML
5 INJECTION INTRAMUSCULAR; INTRAVENOUS
Status: DISCONTINUED | OUTPATIENT
Start: 2023-02-14 | End: 2023-02-14 | Stop reason: HOSPADM

## 2023-02-14 RX ORDER — ONDANSETRON 2 MG/ML
INJECTION INTRAMUSCULAR; INTRAVENOUS PRN
Status: DISCONTINUED | OUTPATIENT
Start: 2023-02-14 | End: 2023-02-14 | Stop reason: SDUPTHER

## 2023-02-14 RX ORDER — SODIUM CHLORIDE, SODIUM LACTATE, POTASSIUM CHLORIDE, CALCIUM CHLORIDE 600; 310; 30; 20 MG/100ML; MG/100ML; MG/100ML; MG/100ML
INJECTION, SOLUTION INTRAVENOUS CONTINUOUS
Status: DISCONTINUED | OUTPATIENT
Start: 2023-02-14 | End: 2023-02-14 | Stop reason: HOSPADM

## 2023-02-14 RX ORDER — SODIUM CHLORIDE 0.9 % (FLUSH) 0.9 %
5-40 SYRINGE (ML) INJECTION PRN
Status: DISCONTINUED | OUTPATIENT
Start: 2023-02-14 | End: 2023-02-14 | Stop reason: HOSPADM

## 2023-02-14 RX ORDER — FENTANYL CITRATE 50 UG/ML
25 INJECTION, SOLUTION INTRAMUSCULAR; INTRAVENOUS
Status: DISCONTINUED | OUTPATIENT
Start: 2023-02-14 | End: 2023-02-14 | Stop reason: HOSPADM

## 2023-02-14 RX ORDER — DEXAMETHASONE 4 MG/1
4 TABLET ORAL ONCE
Status: COMPLETED | OUTPATIENT
Start: 2023-02-14 | End: 2023-02-14

## 2023-02-14 RX ORDER — ACETAMINOPHEN 325 MG/1
975 TABLET ORAL ONCE
Status: COMPLETED | OUTPATIENT
Start: 2023-02-14 | End: 2023-02-14

## 2023-02-14 RX ORDER — PROPOFOL 10 MG/ML
INJECTION, EMULSION INTRAVENOUS CONTINUOUS PRN
Status: DISCONTINUED | OUTPATIENT
Start: 2023-02-14 | End: 2023-02-14 | Stop reason: SDUPTHER

## 2023-02-14 RX ORDER — SODIUM CHLORIDE, SODIUM LACTATE, POTASSIUM CHLORIDE, CALCIUM CHLORIDE 600; 310; 30; 20 MG/100ML; MG/100ML; MG/100ML; MG/100ML
INJECTION, SOLUTION INTRAVENOUS CONTINUOUS PRN
Status: DISCONTINUED | OUTPATIENT
Start: 2023-02-14 | End: 2023-02-14 | Stop reason: SDUPTHER

## 2023-02-14 RX ADMIN — ONDANSETRON 4 MG: 2 INJECTION INTRAMUSCULAR; INTRAVENOUS at 08:48

## 2023-02-14 RX ADMIN — MIDAZOLAM 2 MG: 1 INJECTION INTRAMUSCULAR; INTRAVENOUS at 08:43

## 2023-02-14 RX ADMIN — CEFAZOLIN 2000 MG: 2 INJECTION, POWDER, FOR SOLUTION INTRAMUSCULAR; INTRAVENOUS at 08:43

## 2023-02-14 RX ADMIN — SODIUM CHLORIDE, SODIUM LACTATE, POTASSIUM CHLORIDE, AND CALCIUM CHLORIDE: 600; 310; 30; 20 INJECTION, SOLUTION INTRAVENOUS at 06:47

## 2023-02-14 RX ADMIN — SODIUM CHLORIDE, SODIUM LACTATE, POTASSIUM CHLORIDE, AND CALCIUM CHLORIDE: 600; 310; 30; 20 INJECTION, SOLUTION INTRAVENOUS at 08:43

## 2023-02-14 RX ADMIN — ACETAMINOPHEN 975 MG: 325 TABLET ORAL at 06:44

## 2023-02-14 RX ADMIN — CELECOXIB 200 MG: 200 CAPSULE ORAL at 06:44

## 2023-02-14 RX ADMIN — FENTANYL CITRATE 50 MCG: 50 INJECTION, SOLUTION INTRAMUSCULAR; INTRAVENOUS at 08:50

## 2023-02-14 RX ADMIN — PROPOFOL 120 MCG/KG/MIN: 10 INJECTION, EMULSION INTRAVENOUS at 08:48

## 2023-02-14 RX ADMIN — DEXAMETHASONE 4 MG: 4 TABLET ORAL at 06:58

## 2023-02-14 RX ADMIN — FENTANYL CITRATE 50 MCG: 50 INJECTION, SOLUTION INTRAMUSCULAR; INTRAVENOUS at 08:48

## 2023-02-14 ASSESSMENT — ENCOUNTER SYMPTOMS: SHORTNESS OF BREATH: 0

## 2023-02-14 ASSESSMENT — LIFESTYLE VARIABLES: SMOKING_STATUS: 0

## 2023-02-14 NOTE — DISCHARGE INSTRUCTIONS
POST-OP INSTRUCTIONS FOR MEDIPORT  1. Surgical glue will dissolve do not soak the site, you may shower in 48 hours, pat the incision dry do not rub. 2. If a small are of the wound separates or becomes red and inflamed, call the office to make an appointment. 3. Pain medication may cause constipation, you may take the laxative of your choice. 4. Call the office if you have any questions or concerns during nurse's  Hours Monday-Friday 9:00-4:00. [phone]. In case of emergency the answering service will take your call.

## 2023-02-14 NOTE — ANESTHESIA PRE PROCEDURE
Department of Anesthesiology  Preprocedure Note       Name:  Divine Rosario   Age:  70 y.o.  :  1955                                          MRN:  [id number]         Date:  2023      Surgeon: Laura Owen):  Violet Daugherty MD    Procedure: Procedure(s):  PORT INSERTION WITH FLUOROSCOPY    Medications prior to admission:   Prior to Admission medications    Medication Sig Start Date End Date Taking? Authorizing Provider   mupirocin (BACTROBAN) 2 % ointment Apply to bilateral nostrils BID for 5 days prior to surgery 23   Taylor Ram PA-C   lidocaine-prilocaine (EMLA) 2.5-2.5 % cream Apply topically as needed. 23   Guilherme Armstrong MD   ondansetron (ZOFRAN) 4 MG tablet Take 1 tablet by mouth every 6 hours as needed for Nausea or Vomiting 23   Guilherme Armstrong MD   promethazine (PHENERGAN) 25 MG tablet Take 0.5 tablets by mouth every 6 hours as needed for Nausea 23   Guilherme Armstrong MD   OLANZapine (ZYPREXA) 10 MG tablet Take 1 tablet at night x4 nights with each cycle chemo every 2 weeks x4 cycles 23   Guilherme Armstrong MD   montelukast (SINGULAIR) 10 MG tablet Take 1 tablet by mouth daily  Patient taking differently: Take 10 mg by mouth nightly 23   Violet Daugherty MD   lisinopril-hydrochlorothiazide (PRINZIDE;ZESTORETIC) 10-12.5 MG per tablet Take 1 tablet by mouth nightly 8/15/12   Historical Provider, MD   aspirin 81 MG EC tablet Take 81 mg by mouth daily.       Historical Provider, MD       Current medications:    Current Facility-Administered Medications   Medication Dose Route Frequency Provider Last Rate Last Admin    lactated ringers IV soln infusion   IntraVENous Continuous Mary Spear, DO           Allergies:  No Known Allergies    Problem List:    Patient Active Problem List   Diagnosis Code    Fibrocystic disease of breast N60.19    Dense breasts R92.2    Malignant neoplasm of upper-inner quadrant of right female breast ([facility] [address] C50.211    Malignant neoplasm of lower-inner quadrant of right female breast ([facility] [address] C50.311       Past Medical History:        Diagnosis Date    Cancer ([facility] [address] 12/09/2022    right breast    Fibrocystic breast disease     Hypertension     Post-menopausal        Past Surgical History:        Procedure Laterality Date    BREAST BIOPSY  5/2011    left breast mammotome - benign fibrocystic disease    BREAST CYST EXCISION  1973    left breast - benign    US BREAST BIOPSY W LOC DEVICE 1ST LESION RIGHT Right 12/19/2022    US BREAST NEEDLE BIOPSY RIGHT 12/19/2022 [facility]       Social History:    Social History     Tobacco Use    Smoking status: Never    Smokeless tobacco: Never   Substance Use Topics    Alcohol use: Yes     Comment: occasionally 1 monthly                                Counseling given: Not Answered      Vital Signs (Current):   Vitals:    02/14/23 0637   BP: (!) 150/69   Pulse: 74   Resp: 18   Temp: 97.8 °F (36.6 °C)   TempSrc: Temporal   SpO2: 100%   Weight: 179 lb (81.2 kg)   Height: 5' 8\" (1.727 m)                                              BP Readings from Last 3 Encounters:   02/14/23 (!) 150/69   01/25/23 130/74   12/08/22 (!) 160/81       NPO Status: Time of last liquid consumption: 0000                        Time of last solid consumption: 0000                        Date of last liquid consumption: 02/13/23                        Date of last solid food consumption: 02/13/23    BMI:   Wt Readings from Last 3 Encounters:   02/14/23 179 lb (81.2 kg)   01/30/23 179 lb (81.2 kg)   01/25/23 180 lb 4.8 oz (81.8 kg)     Body mass index is 27.22 kg/m².     CBC:   Lab Results   Component Value Date/Time    WBC 5.20 01/25/2023 01:40 PM    RBC 5.37 01/25/2023 01:40 PM    HGB 13.0 01/25/2023 01:40 PM    HCT 41.7 01/25/2023 01:40 PM    MCV 77.7 01/25/2023 01:40 PM    RDW 14.3 01/25/2023 01:40 PM     01/25/2023 01:40 PM       CMP:   Lab Results   Component Value Date/Time     01/25/2023 03:34 PM    K 4.8 01/25/2023 03:34 PM     01/25/2023 03:34 PM    CO2 29 01/25/2023 03:34 PM    BUN 17 01/25/2023 03:34 PM    CREATININE 0.6 01/25/2023 03:34 PM    LABGLOM >60 01/25/2023 03:34 PM    GLUCOSE 95 01/25/2023 03:34 PM    PROT 6.7 01/25/2023 03:34 PM    CALCIUM 9.7 01/25/2023 03:34 PM    BILITOT 0.4 01/25/2023 03:34 PM    ALKPHOS 74 01/25/2023 03:34 PM    AST 17 01/25/2023 03:34 PM    ALT 17 01/25/2023 03:34 PM       POC Tests: No results for input(s): POCGLU, POCNA, POCK, POCCL, POCBUN, POCHEMO, POCHCT in the last 72 hours.    Coags: No results found for: PROTIME, INR, APTT    HCG (If Applicable): No results found for: PREGTESTUR, PREGSERUM, HCG, HCGQUANT     ABGs: No results found for: PHART, PO2ART, OAN9RJP, JBI4JVT, BEART, Y5OLIXXS     Type & Screen (If Applicable):  No results found for: LABABO, LABRH    Drug/Infectious Status (If Applicable):  No results found for: HIV, HEPCAB    COVID-19 Screening (If Applicable): No results found for: COVID19        Anesthesia Evaluation  Patient summary reviewed and Nursing notes reviewed no history of anesthetic complications:   Airway: Mallampati: II  TM distance: >3 FB   Neck ROM: full  Mouth opening: < 3 FB   Dental: normal exam         Pulmonary:       (-) shortness of breath, sleep apnea and not a current smoker                          ROS comment: CT chest:  1. Multiple small subpleural nodules in the right upper lung field of indeterminate significance. Scattered borderline nonspecific intrathoracic adenopathy, as above. Suggest a follow-up chest CT in 3 months, and/or comparison with priors, if clinically   appropriate.  2. Lobulated mass lesions with punctate calcifications in the right breast. These are consistent with known malignant neoplasm of the right breast. Consider correlation with dedicated ultrasound and mammography, if clinically appropriate.  3. A heterogenous hypodense nodule in the left lobe of the thyroid gland, and cannot exclude  a small right thyroid nodule, as above, for which correlation with ultrasound is recommended. 4. Other findings, as above. Please see comments above. Cardiovascular:  Exercise tolerance: good (>4 METS),   (+) hypertension:,     (-) pacemaker, past MI, CAD, CABG/stent, dysrhythmias,  angina and no hyperlipidemia    ECG reviewed               Beta Blocker:  Not on Beta Blocker      ROS comment: EK BPM  Sinus rhythm with 1st degree A-V block  Comparison Summary: No serial comparison made  Summary: Abnormal ECG     Neuro/Psych:      (-) seizures and CVA           GI/Hepatic/Renal:        (-) GERD, liver disease and no renal disease       Endo/Other:    (+) malignancy/cancer (right breast cancer). (-) diabetes mellitus, blood dyscrasia               Abdominal:   (+) obese,           Vascular:     - DVT and PE. Other Findings:           Anesthesia Plan      MAC and TIVA     ASA 2     (Decadron in preop.)  Induction: intravenous. MIPS: Postoperative opioids intended and Prophylactic antiemetics administered. Anesthetic plan and risks discussed with patient.                         Kapil Forman DO   2023

## 2023-02-14 NOTE — H&P
HISTORY OF PRESENT ILLNESS:     Ms. Nithya Thompson  is recently status post ultrasound guided breast biopsy on the right x's 2 areas. At 1:00 revealed a 1.6  cm high grade invasive ductal carcinoma. At 4:00 revealed a 1 cm intermediate grade invasive ductal mammary carcinoma. (1:00) ER positive at 99%. GA negative. Her2 positive. Ki67 55%. (4:00) ER positive at 96%. GA positive at 100%. Her2 positive. Ki67 14%. FISH Negative for both. Ultrasound-guided FNA of right axillary node was negative for malignancy     Mammaprint is High Risk Luminal B-Type      MRI-1/11/2023         EXAM: MRI BREAST BILATERAL W WO CONTRAST -- 1/11/2023 8:45 AM   INDICATION: New breast cancer. Evaluate extent of disease. HISTORY: 75 years, Female, C50.211. Surgical pathology report   12/19/2022 \"RIGHT breast 1:00 core biopsies: Invasive carcinoma of no   special type (ductal). RIGHT breast 4:00 core biopsy: Invasive   carcinoma of no special type (ductal). \" Cytology report 12/19/2022   RIGHT axillary lymph node fine-needle aspiration negative for   malignant cells. Initial staging. Patient reports bilateral breast   tenderness. COMPARISON: 12/19/2022, 12/8/2022, 8/9/2021   FAMILY HISTORY OF BREAST CANCER: None reported   TECHNIQUE: Routine protocol MRI performed of the breasts without and   with intravenous contrast. Post processing performed on a separate   workstation. FINDINGS:   Amount of Fibroglandular Tissue: Heterogeneous fibroglandular tissue. Background Parenchymal Enhancement:  Mild. RIGHT breast with a 4.5 x 2.8 cm (AP by transverse) bilobed or 2   closely adjacent round masses with partially spiculated margins,   heterogeneous enhancement rapid washout kinetics with associated   nonmass enhancement measuring 7.4 x 3.8 cm (AP by transverse) in the   middle third, upper inner quadrant, 1:00 position, anterior aspect 3.8   cm from the nipple and posterior aspect 1.8 cm from the chest wall.    This correlates with biopsy-proven malignancy and has biopsy marker at   the posterior aspect of the mass. Nonmass enhancement extends to and   appears to involve the medial skin surface and apparent subtle skin   thickening, concerning for invasion. Prior RIGHT breast ultrasound   demonstrated two separate correlating masses measuring 2.3 x 2.0 x 2.1   cm and 1.6 x 1.9 x 1.1 cm on 12/8/2022, which are measured in   aggregate on today's MRI. RIGHT breast with a 1.2 x 1.1 cm round circumscribed mass (on   postcontrast series 500, image 55) with rapid washout kinetics   situated in nonmass enhancement (on postcontrast subtraction images)   measuring up to 1.8 cm in the far posterior third, lower inner   quadrant, 4:00 position, 0.4 cm from the chest wall. This correlates   with biopsy-proven malignancy and has adjacent biopsy marker. Prior   ultrasound measured a correlating mass previously measured 1.2 x 1.3 x   0.9 cm on 12/8/2022. RIGHT breast with a 0.8 cm round homogeneously enhancing with plateau   and persisting kinetics in the posterior third, slightly upper outer   quadrant, 11:00 position, 2 cm lateral to the above-described dominant   RIGHT upper inner quadrant mass, concerning for satellite lesion. No focally suspicious finding in the LEFT breast.   There are 2 slightly asymmetric low-lying RIGHT axillary lymph nodes   on postcontrast axial series 500, images 79 and 94, which are   indeterminant. Prior RIGHT axillary ultrasound 12/8/2022 demonstrates   a lymph node with nodular cortical thickening, which is favored to   correlate with the lymph node on axial image 79. Appearance is   concerning for willy disease. No LEFT axillary adenopathy. No internal mammary adenopathy. Enlarged LEFT thyroid which appears to have a dominant nodule   measuring 3 cm.  Otherwise the visualized portion of the chest and   abdomen appears grossly within normal limits, not optimally assessed   on this exam. No focally suspicious bony finding. Impression   1. RIGHT breast upper inner quadrant dominant mass, consistent with   biopsy proven malignancy. There is nonmass enhancement extending to   the medial skin, which appears to have subtle thickening, concerning   for invasion. 2. RIGHT breast lower inner quadrant 4:00 position mass, consistent   with biopsy proven malignancy. Note that the posterior aspect of the   mass is 0.4 cm from the chest wall. 3. RIGHT breast upper outer quadrant with a 0.8 cm circumscribed mass,   concerning for satellite disease. 4. Overall appearance of the RIGHT breast concerning for multicentric   disease. 5. Suspicious low-lying RIGHT axillary lymph node as above. Although   this was negative by fine-needle aspiration, the imaging appearance   remains suspicious. 6. No internal mammary or LEFT axillary adenopathy. 7. LEFT 3 cm thyroid nodule, not optimally evaluated by MRI. If not   previously performed, consider nonemergent thyroid ultrasound. BI-RADS Final Assessment Category 6: Known Biopsy-Proven Malignancy   Management Recommendation: Surgical excision when clinically   appropriate.       Jerrod Garland is a 70 y.o. female with the following history as recorded in [facility]:  Patient Active Problem List    Diagnosis Date Noted    Malignant neoplasm of upper-inner quadrant of right female breast ([facility] [address] 12/21/2022    Malignant neoplasm of lower-inner quadrant of right female breast ([facility] [address] 12/21/2022    Dense breasts 05/25/2017    Fibrocystic disease of breast 11/01/2011     Current Facility-Administered Medications   Medication Dose Route Frequency Provider Last Rate Last Admin    lactated ringers IV soln infusion   IntraVENous Continuous Alonso Mahoney  mL/hr at 02/14/23 0647 New Bag at 02/14/23 0647    lidocaine PF 1 % injection 1 mL  1 mL IntraDERmal Once PRN Alonso Mahoney DO        sodium chloride flush 0.9 % injection 5-40 mL  5-40 mL IntraVENous 2 times per day Alonso Mahoney DO sodium chloride flush 0.9 % injection 5-40 mL  5-40 mL IntraVENous PRN Edythe Haagensen, DO        0.9 % sodium chloride infusion   IntraVENous PRN Edythe Haagensen, DO        midazolam PF (VERSED) injection 2 mg  2 mg IntraVENous Once PRN Edythe Haagensen, DO        fentaNYL (SUBLIMAZE) injection 25 mcg  25 mcg IntraVENous Once PRN Edythe Haagensen, DO        Or    fentaNYL (SUBLIMAZE) injection 50 mcg  50 mcg IntraVENous Once PRN Edythe Haagensen, DO        sodium chloride flush 0.9 % injection 5-40 mL  5-40 mL IntraVENous 2 times per day Edythe Haagensen, DO        sodium chloride flush 0.9 % injection 5-40 mL  5-40 mL IntraVENous PRN Edythe Haagensen, DO        0.9 % sodium chloride infusion   IntraVENous PRN Edythe Haagensen, DO        meperidine (DEMEROL) injection 12.5 mg  12.5 mg IntraVENous Q5 Min PRN Edythe Haagensen, DO        fentaNYL (SUBLIMAZE) injection 25 mcg  25 mcg IntraVENous Q5 Min PRN Edythe Haagensen, DO        fentaNYL (SUBLIMAZE) injection 50 mcg  50 mcg IntraVENous Q5 Min PRN Edythe Haagensen, DO        ondansetron [facility]) injection 4 mg  4 mg IntraVENous Once PRN Edythe Haagensen, DO        prochlorperazine (COMPAZINE) injection 5 mg  5 mg IntraVENous Once PRN Edythe Haagensen, DO        diphenhydrAMINE (BENADRYL) injection 12.5 mg  12.5 mg IntraVENous Once PRN Edythe Haagensen, DO         Allergies: Patient has no known allergies.   Past Medical History:   Diagnosis Date    Cancer ([facility] [address] 12/09/2022    right breast    Fibrocystic breast disease     Hypertension     Post-menopausal      Past Surgical History:   Procedure Laterality Date    BREAST BIOPSY  5/2011    left breast mammotome - benign fibrocystic disease    BREAST CYST EXCISION  1973    left breast - benign    US BREAST BIOPSY W LOC DEVICE 1ST LESION RIGHT Right 12/19/2022    US BREAST NEEDLE BIOPSY RIGHT 12/19/2022 [facility]AL SURGERY     Family History   Problem Relation Age of Onset    Prostate Cancer Father         Age Unknown    Breast Cancer Paternal Grandmother     Cancer Paternal Grandfather         Prostate     Social History     Tobacco Use    Smoking status: Never    Smokeless tobacco: Never   Substance Use Topics    Alcohol use: Yes     Comment: occasionally 1 monthly       ROS:  14 point review of systems is negative except for the above. PHYSICAL EXAM:    The patient is a 70 y.o. female  in no acute distress. She is alert oriented and cooperative. Mood and affect are appropriate. Skin is warm and dry without rashes. BP (!) 150/69   Pulse 74   Temp 97.8 °F (36.6 °C) (Temporal)   Resp 18   Ht 5' 8\" (1.727 m)   Wt 179 lb (81.2 kg)   SpO2 100%   BMI 27.22 kg/m²       HEENT: Normocephalic and atraumatic. EOMs intact. Pupils equal and round and reactive to light and accommodation. External ears and nose are normal.  Sclera nonicteric. Conjunctiva normal  Oropharynx without masses or lesions. Neck: Neck is supple without masses or thyromegaly    Chest: Lungs are clear to auscultation. Respiratory effort normal    Cardiac: Regular rate and rhythm without rubs, murmurs, or gallops    Breasts: The breasts are symmetrical. There are fibrocystic changes throughout both breasts. There are no dominant masses, no skin or nipple changes, and no axillary adenopathy. Abdomen: The abdomen is soft and nontender with no hepatosplenomegaly. There are no abdominal hernias noted. Extremities: The extremities are normal. There are no signs of clubbing, cyanosis, or edema. IMPRESSION:multifocal right breast cancer Luminal B       DISCUSSION:  I had a lengthy discussion with Ms. Mary Chakraborty  and her family about the ramifications of the diagnosis of breast cancer. We discussed the pathophysiology of cancer in general and also the ways in which surgery, radiation therapy, and chemotherapy are utilized in the treatment of different types of cancers. We also explained how these modalities related to her situation in particular.   We discussed the pathophysiology of breast cancer and some length, including what is known about the causes of breast cancer, its relationship to fibrocystic disease, its relationship to hormone replacement therapy, and some of the genetic aspects involved in familial breast cancers. We discussed the BrCa genetic analysis and why it is appropriate for her. We discussed breast MRI and how it assists in evaluation of breast cancers and the results of her MRI if done. We discussed the surgical options including simple mastectomy and lumpectomy with  sentinel lymph node biopsy as well as the possibility of axillary lymph node dissection. We explained in depth why breast conservation therapy requires radiation treatments for the majority of women. These treatments may be external beam for 6 weeks, partial breast for 5 days,  or intraoperative. I explained that most women treated for invasive malignancy do receive systemic therapy, hormonal therapy or  chemotherapy postoperatively depending upon the final pathology, the lymph node status, and the hormone receptor status. I discussed Oncotype Dx, Mammoprint, and Adjuvant Online as tools which aid in the decision for chemotherapy or hormonal therapy. We also discussed the possibility of breast reconstruction if a mastectomy was required. .  I explained to her the different techniques including placement of a subpectoral implant with a Alloderm sling  versus TRAM flap reconstruction as welll as other methods of reconstruction. She does not wish to pursue reconstruction at this time. After a prolonged discussion lasting 120 minutes  we felt it was most appropriate that she undergo  neoadjuvant chemotherapy and port placement       We discussed the risks and benefits of the surgery including but not limited to bleeding, infection, pain, lymphedema, numbness, and also the risks of general anesthesia including pneumonia, blood clots, heart attack, stroke, and death.   She expresses good understanding and is agreeable to proceed with surgery.        We will schedule this to be done as soon as possible  at [facility]. negative...

## 2023-02-14 NOTE — ANESTHESIA POSTPROCEDURE EVALUATION
Department of Anesthesiology  Postprocedure Note    Patient: Mehul Aquino  MRN: [id number]  YOB: 1955  Date of evaluation: 2/14/2023      Procedure Summary     Date: 02/14/23 Room / Location: [address]    Anesthesia Start: 7236 Anesthesia Stop: 9088    Procedure: PORT INSERTION WITH FLUOROSCOPY Diagnosis:       Malignant neoplasm of upper-inner quadrant of right female breast, unspecified estrogen receptor status ([address]      (Malignant neoplasm of upper-inner quadrant of right female breast, unspecified estrogen receptor status ([address] [C50.211])    Surgeons: Joshua Crow MD Responsible Provider: RUTH Combs CRNA    Anesthesia Type: MAC, TIVA ASA Status: 2          Anesthesia Type: No value filed.     Elder Phase I: Elder Score: 4    Elder Phase II:        Anesthesia Post Evaluation    Patient location during evaluation: bedside  Patient participation: complete - patient participated  Level of consciousness: awake and alert  Pain score: 0  Airway patency: patent  Nausea & Vomiting: no nausea  Complications: no  Cardiovascular status: hemodynamically stable  Respiratory status: acceptable  Hydration status: euvolemic  Comments: /63   Pulse 74   Temp 98.8 °F (37.1 °C)   Resp 16   Ht 5' 8\" (1.727 m)   Wt 179 lb (81.2 kg)   SpO2 94%   BMI 27.22 kg/m²

## 2023-02-14 NOTE — BRIEF OP NOTE
Brief Postoperative Note      DATE OF PROCEDURE: 2/14/2023     SURGEON: Katharina Leonardo MD    PREOPERATIVE DIAGNOSIS:  Malignant neoplasm of upper-inner quadrant of right female breast, unspecified estrogen receptor status ([facility] [C50.211]    POSTOPERATIVE DIAGNOSIS: Same     OPERATION: Procedure(s):  PORT INSERTION WITH FLUOROSCOPY    ANESTHESIA: Monitor Anesthesia Care    ESTIMATED BLOOD LOSS: Minimal    COMPLICATIONS: None. SPECIMENS: * No specimens in log *    DRAINS: None    The patient tolerated the procedure well.     Electronically signed by Katharina Leonardo MD  on 2/14/2023 at 9:51 AM

## 2023-02-14 NOTE — PROGRESS NOTES
Patient resting quietly at this time. Respirations even and unlabored. Denies any pain or discomfort at this time. Chest x-ray completed as ordered.

## 2023-02-15 ENCOUNTER — HOSPITAL ENCOUNTER (OUTPATIENT)
Dept: INFUSION THERAPY | Age: 68
Discharge: HOME | End: 2023-02-15
Payer: MEDICARE

## 2023-02-15 VITALS
OXYGEN SATURATION: 98 % | HEART RATE: 70 BPM | TEMPERATURE: 97.6 F | WEIGHT: 184.5 LBS | SYSTOLIC BLOOD PRESSURE: 149 MMHG | RESPIRATION RATE: 18 BRPM | HEIGHT: 68 IN | DIASTOLIC BLOOD PRESSURE: 77 MMHG | BODY MASS INDEX: 27.96 KG/M2

## 2023-02-15 DIAGNOSIS — Z17.0 MALIGNANT NEOPLASM OF UPPER-INNER QUADRANT OF RIGHT BREAST IN FEMALE, ESTROGEN RECEPTOR POSITIVE (HCC): ICD-10-CM

## 2023-02-15 DIAGNOSIS — C50.311 MALIGNANT NEOPLASM OF LOWER-INNER QUADRANT OF RIGHT BREAST OF FEMALE, ESTROGEN RECEPTOR POSITIVE (HCC): ICD-10-CM

## 2023-02-15 DIAGNOSIS — C50.911 INVASIVE DUCTAL CARCINOMA OF BREAST, FEMALE, RIGHT (HCC): Primary | ICD-10-CM

## 2023-02-15 DIAGNOSIS — C50.211 MALIGNANT NEOPLASM OF UPPER-INNER QUADRANT OF RIGHT BREAST IN FEMALE, ESTROGEN RECEPTOR POSITIVE (HCC): ICD-10-CM

## 2023-02-15 DIAGNOSIS — Z17.0 MALIGNANT NEOPLASM OF LOWER-INNER QUADRANT OF RIGHT BREAST OF FEMALE, ESTROGEN RECEPTOR POSITIVE (HCC): ICD-10-CM

## 2023-02-15 LAB
ALBUMIN SERPL-MCNC: 3.8 G/DL (ref 3.5–5.2)
ALP BLD-CCNC: 64 U/L (ref 35–104)
ALT SERPL-CCNC: 20 U/L (ref 9–52)
ANION GAP SERPL CALCULATED.3IONS-SCNC: 4 MMOL/L (ref 7–19)
AST SERPL-CCNC: 23 U/L (ref 14–36)
BILIRUB SERPL-MCNC: <0.2 MG/DL (ref 0.2–1.3)
BUN BLDV-MCNC: 16 MG/DL (ref 7–17)
CALCIUM SERPL-MCNC: 9.2 MG/DL (ref 8.4–10.2)
CHLORIDE BLD-SCNC: 110 MMOL/L (ref 98–111)
CO2: 27 MMOL/L (ref 22–29)
CREAT SERPL-MCNC: 0.7 MG/DL (ref 0.5–1)
GFR SERPL CREATININE-BSD FRML MDRD: >60 ML/MIN/{1.73_M2}
GLOBULIN: 2 G/DL
GLUCOSE BLD-MCNC: 103 MG/DL (ref 74–106)
HCT VFR BLD CALC: 37.4 % (ref 34.1–44.9)
HEMOGLOBIN: 11.6 G/DL (ref 11.2–15.7)
LYMPHOCYTES ABSOLUTE: 2.95 K/UL (ref 1.18–3.74)
LYMPHOCYTES RELATIVE PERCENT: 29.5 % (ref 19.3–53.1)
MCH RBC QN AUTO: 24 PG (ref 25.6–32.2)
MCHC RBC AUTO-ENTMCNC: 31 G/DL (ref 32.3–35.5)
MCV RBC AUTO: 77.3 FL (ref 79.4–94.8)
MONOCYTES ABSOLUTE: 0.72 K/UL (ref 0.24–0.82)
MONOCYTES RELATIVE PERCENT: 7.2 % (ref 4.7–12.5)
NEUTROPHILS ABSOLUTE: 6.1 K/UL (ref 1.56–6.13)
NEUTROPHILS RELATIVE PERCENT: 61.1 % (ref 34–71.1)
PDW BLD-RTO: 13.8 % (ref 11.7–14.4)
PLATELET # BLD: 258 K/UL (ref 182–369)
PMV BLD AUTO: 9.9 FL (ref 7.4–10.4)
POTASSIUM SERPL-SCNC: 3.9 MMOL/L (ref 3.5–5.1)
RBC # BLD: 4.84 M/UL (ref 3.93–5.22)
SODIUM BLD-SCNC: 141 MMOL/L (ref 137–145)
TOTAL PROTEIN: 5.8 G/DL (ref 6.3–8.2)
WBC # BLD: 9.99 K/UL (ref 3.98–10.04)

## 2023-02-15 PROCEDURE — 36415 COLL VENOUS BLD VENIPUNCTURE: CPT

## 2023-02-15 PROCEDURE — 96360 HYDRATION IV INFUSION INIT: CPT

## 2023-02-15 PROCEDURE — 96417 CHEMO IV INFUS EACH ADDL SEQ: CPT

## 2023-02-15 PROCEDURE — 96367 TX/PROPH/DG ADDL SEQ IV INF: CPT

## 2023-02-15 PROCEDURE — 96413 CHEMO IV INFUSION 1 HR: CPT

## 2023-02-15 PROCEDURE — 96375 TX/PRO/DX INJ NEW DRUG ADDON: CPT

## 2023-02-15 PROCEDURE — 96411 CHEMO IV PUSH ADDL DRUG: CPT

## 2023-02-15 PROCEDURE — 96361 HYDRATE IV INFUSION ADD-ON: CPT

## 2023-02-15 PROCEDURE — 6360000002 HC RX W HCPCS: Performed by: INTERNAL MEDICINE

## 2023-02-15 PROCEDURE — 2580000003 HC RX 258: Performed by: INTERNAL MEDICINE

## 2023-02-15 PROCEDURE — 80053 COMPREHEN METABOLIC PANEL: CPT

## 2023-02-15 PROCEDURE — 85025 COMPLETE CBC W/AUTO DIFF WBC: CPT

## 2023-02-15 RX ORDER — ACETAMINOPHEN 325 MG/1
650 TABLET ORAL
Status: CANCELLED | OUTPATIENT
Start: 2023-02-15

## 2023-02-15 RX ORDER — 0.9 % SODIUM CHLORIDE 0.9 %
1000 INTRAVENOUS SOLUTION INTRAVENOUS ONCE
Status: CANCELLED
Start: 2023-02-15 | End: 2023-02-15

## 2023-02-15 RX ORDER — ONDANSETRON 2 MG/ML
8 INJECTION INTRAMUSCULAR; INTRAVENOUS
Status: CANCELLED | OUTPATIENT
Start: 2023-02-15

## 2023-02-15 RX ORDER — DOXORUBICIN HYDROCHLORIDE 2 MG/ML
60 INJECTION, SOLUTION INTRAVENOUS ONCE
Status: DISCONTINUED | OUTPATIENT
Start: 2023-02-15 | End: 2023-02-15 | Stop reason: SDUPTHER

## 2023-02-15 RX ORDER — EPINEPHRINE 1 MG/ML
0.3 INJECTION, SOLUTION, CONCENTRATE INTRAVENOUS PRN
Status: CANCELLED | OUTPATIENT
Start: 2023-02-15

## 2023-02-15 RX ORDER — SODIUM CHLORIDE 9 MG/ML
5-250 INJECTION, SOLUTION INTRAVENOUS PRN
Status: CANCELLED | OUTPATIENT
Start: 2023-02-15

## 2023-02-15 RX ORDER — DIPHENHYDRAMINE HYDROCHLORIDE 50 MG/ML
50 INJECTION INTRAMUSCULAR; INTRAVENOUS
Status: CANCELLED | OUTPATIENT
Start: 2023-02-15

## 2023-02-15 RX ORDER — HEPARIN SODIUM (PORCINE) LOCK FLUSH IV SOLN 100 UNIT/ML 100 UNIT/ML
500 SOLUTION INTRAVENOUS PRN
Status: DISCONTINUED | OUTPATIENT
Start: 2023-02-15 | End: 2023-02-16 | Stop reason: HOSPADM

## 2023-02-15 RX ORDER — DEXAMETHASONE SODIUM PHOSPHATE 10 MG/ML
10 INJECTION, SOLUTION INTRAMUSCULAR; INTRAVENOUS ONCE
Status: COMPLETED | OUTPATIENT
Start: 2023-02-15 | End: 2023-02-15

## 2023-02-15 RX ORDER — FAMOTIDINE 10 MG/ML
20 INJECTION, SOLUTION INTRAVENOUS
Status: CANCELLED | OUTPATIENT
Start: 2023-02-15

## 2023-02-15 RX ORDER — SODIUM CHLORIDE 9 MG/ML
INJECTION, SOLUTION INTRAVENOUS CONTINUOUS
Status: CANCELLED | OUTPATIENT
Start: 2023-02-15

## 2023-02-15 RX ORDER — 0.9 % SODIUM CHLORIDE 0.9 %
1000 INTRAVENOUS SOLUTION INTRAVENOUS ONCE
Status: COMPLETED | OUTPATIENT
Start: 2023-02-15 | End: 2023-02-15

## 2023-02-15 RX ORDER — DOXORUBICIN HYDROCHLORIDE 2 MG/ML
60 INJECTION, SOLUTION INTRAVENOUS ONCE
Status: CANCELLED | OUTPATIENT
Start: 2023-02-15 | End: 2023-02-15

## 2023-02-15 RX ORDER — HEPARIN SODIUM (PORCINE) LOCK FLUSH IV SOLN 100 UNIT/ML 100 UNIT/ML
500 SOLUTION INTRAVENOUS PRN
Status: CANCELLED | OUTPATIENT
Start: 2023-02-15

## 2023-02-15 RX ORDER — SODIUM CHLORIDE 9 MG/ML
5-40 INJECTION INTRAVENOUS PRN
Status: DISCONTINUED | OUTPATIENT
Start: 2023-02-15 | End: 2023-02-16 | Stop reason: HOSPADM

## 2023-02-15 RX ORDER — PALONOSETRON 0.05 MG/ML
0.25 INJECTION, SOLUTION INTRAVENOUS ONCE
Status: COMPLETED | OUTPATIENT
Start: 2023-02-15 | End: 2023-02-15

## 2023-02-15 RX ORDER — SODIUM CHLORIDE 9 MG/ML
5-40 INJECTION INTRAVENOUS PRN
Status: CANCELLED | OUTPATIENT
Start: 2023-02-15

## 2023-02-15 RX ORDER — PALONOSETRON 0.05 MG/ML
0.25 INJECTION, SOLUTION INTRAVENOUS ONCE
Status: CANCELLED | OUTPATIENT
Start: 2023-02-15 | End: 2023-02-15

## 2023-02-15 RX ORDER — ALBUTEROL SULFATE 90 UG/1
4 AEROSOL, METERED RESPIRATORY (INHALATION) PRN
Status: CANCELLED | OUTPATIENT
Start: 2023-02-15

## 2023-02-15 RX ORDER — MEPERIDINE HYDROCHLORIDE 50 MG/ML
12.5 INJECTION INTRAMUSCULAR; INTRAVENOUS; SUBCUTANEOUS PRN
Status: CANCELLED | OUTPATIENT
Start: 2023-02-15

## 2023-02-15 RX ADMIN — FOSAPREPITANT 150 MG: 150 INJECTION, POWDER, LYOPHILIZED, FOR SOLUTION INTRAVENOUS at 09:10

## 2023-02-15 RX ADMIN — SODIUM CHLORIDE 1000 ML: 9 INJECTION, SOLUTION INTRAVENOUS at 09:00

## 2023-02-15 RX ADMIN — CYCLOPHOSPHAMIDE 1180 MG: 1 INJECTION, POWDER, FOR SOLUTION INTRAVENOUS; ORAL at 10:11

## 2023-02-15 RX ADMIN — DEXAMETHASONE SODIUM PHOSPHATE 10 MG: 10 INJECTION, SOLUTION INTRAMUSCULAR; INTRAVENOUS at 09:00

## 2023-02-15 RX ADMIN — SODIUM CHLORIDE 118 MG: 9 INJECTION, SOLUTION INTRAVENOUS at 09:47

## 2023-02-15 RX ADMIN — HEPARIN 500 UNITS: 100 SYRINGE at 11:08

## 2023-02-15 RX ADMIN — PALONOSETRON 0.25 MG: 0.05 INJECTION, SOLUTION INTRAVENOUS at 09:01

## 2023-02-15 RX ADMIN — SODIUM CHLORIDE, PRESERVATIVE FREE 10 ML: 5 INJECTION INTRAVENOUS at 11:08

## 2023-02-15 NOTE — OP NOTE
[facility] OF [facility] [address] [facility]                                OPERATIVE REPORT    PATIENT NAME: Erlinda Hickey                     :        1955  MED REC NO:   [id number]                              ROOM:  ACCOUNT NO:   [de-identified]                           ADMIT DATE: 2023  PROVIDER:     James García MD    DATE OF PROCEDURE:  2023    PREOPERATIVE DIAGNOSIS:  Right breast cancer for neoadjuvant  chemotherapy. POSTOPERATIVE DIAGNOSIS:  Right breast cancer for neoadjuvant  chemotherapy. PROCEDURE:  Left subclavian single-lumen LifePort catheter placement. SURGEON:  James García MD    ASSISTANT:  Liz Wells PA-C    ANESTHESIA:  Local with sedation. INDICATIONS:  The patient is a 19-year-old lady who recently diagnosed  with multifocal luminal B breast cancer. She is going to be treated  with neoadjuvant chemotherapy prior to her surgery. We discussed risks  and benefits of port placement with her and her . They  understand and are agreeable. OPERATIVE PROCEDURE:  Today, she was brought to the operating room,  adequately sedated, and prepped and draped in the sterile fashion. Left  infraclavicular area was anesthetized with 1/16% Xylocaine. The  subclavian vein was cannulated without incident. A wire was passed into  the superior vena cava under fluoroscopic guidance. We then  anesthetized the subcutaneous pocket, made a transverse incision and  elevated our pocket. We then threaded the dilator in the superior vena  cava, passed the catheter through the sheath, peeled away the sheath,  and positioned the catheter appropriately under fluoroscopic guidance. We did utilize intravenous contrast to facilitate positioning of the  catheter. Once this was positioned appropriately, it was attached to  the port in the usual fashion.   We then sutured the port into place with  multiple 2-0 silk stitches. We then irrigated the pocket with  antibiotic solution, flushed and aspirated the port with heparinized  saline, confirmed good position with fluoroscopy and then closed with  2-0 and 3-0 Vicryl and 4-0 Monocryl for the skin. A Dermabond dressing  was applied. Estimated blood loss, minimal.  Complications, none. She  tolerated the procedure well.         Delphine Borrero MD    D: 02/14/2023 11:18:00      T: 02/14/2023 12:20:50     [email or web address]  Job#: [id number]     Doc#: [id number]    CC:

## 2023-02-16 ENCOUNTER — HOSPITAL ENCOUNTER (OUTPATIENT)
Dept: INFUSION THERAPY | Age: 68
Discharge: HOME | End: 2023-02-16
Payer: MEDICARE

## 2023-02-16 DIAGNOSIS — C50.311 MALIGNANT NEOPLASM OF LOWER-INNER QUADRANT OF RIGHT BREAST OF FEMALE, ESTROGEN RECEPTOR POSITIVE (HCC): ICD-10-CM

## 2023-02-16 DIAGNOSIS — Z17.0 MALIGNANT NEOPLASM OF LOWER-INNER QUADRANT OF RIGHT BREAST OF FEMALE, ESTROGEN RECEPTOR POSITIVE (HCC): ICD-10-CM

## 2023-02-16 DIAGNOSIS — Z17.0 MALIGNANT NEOPLASM OF UPPER-INNER QUADRANT OF RIGHT BREAST IN FEMALE, ESTROGEN RECEPTOR POSITIVE (HCC): Primary | ICD-10-CM

## 2023-02-16 DIAGNOSIS — C50.211 MALIGNANT NEOPLASM OF UPPER-INNER QUADRANT OF RIGHT BREAST IN FEMALE, ESTROGEN RECEPTOR POSITIVE (HCC): Primary | ICD-10-CM

## 2023-02-16 PROCEDURE — 96372 THER/PROPH/DIAG INJ SC/IM: CPT

## 2023-02-16 PROCEDURE — 6360000002 HC RX W HCPCS: Performed by: INTERNAL MEDICINE

## 2023-02-16 RX ADMIN — PEGFILGRASTIM 6 MG: 6 INJECTION SUBCUTANEOUS at 13:01

## 2023-02-21 ENCOUNTER — HOSPITAL ENCOUNTER (OUTPATIENT)
Dept: ULTRASOUND IMAGING | Facility: HOSPITAL | Age: 68
Discharge: HOME | End: 2023-02-21
Admitting: RADIOLOGY
Payer: MEDICARE

## 2023-02-21 ENCOUNTER — TELEPHONE (OUTPATIENT)
Dept: OTHER | Age: 68
End: 2023-02-21

## 2023-02-21 DIAGNOSIS — E04.1 THYROID NODULE: ICD-10-CM

## 2023-02-21 PROCEDURE — 88172 CYTP DX EVAL FNA 1ST EA SITE: CPT | Performed by: NURSE PRACTITIONER

## 2023-02-21 PROCEDURE — 76942 ECHO GUIDE FOR BIOPSY: CPT

## 2023-02-21 PROCEDURE — 88112 CYTOPATH CELL ENHANCE TECH: CPT | Performed by: NURSE PRACTITIONER

## 2023-02-21 RX ORDER — LIDOCAINE HYDROCHLORIDE 10 MG/ML
5 INJECTION, SOLUTION INFILTRATION; PERINEURAL ONCE
Status: DISPENSED | OUTPATIENT
Start: 2023-02-21

## 2023-02-21 NOTE — TELEPHONE ENCOUNTER
Pt called back and stated she was doing well and had thyroid biopsy today. She states that after treatment, she feels like she has the flu the first day and then better after. Encouraged patient that she will know how to treat her symptoms and to listen to her body. Pt verbalized understanding. She knows I am here for her and encouraged her to reach out.

## 2023-02-21 NOTE — TELEPHONE ENCOUNTER
Left a detailed message on identified voicemail  Checking on patient. She is to call, message or my chart if she has questions or concerns.

## 2023-02-22 DIAGNOSIS — Z00.00 ENCOUNTER FOR PREVENTIVE CARE: ICD-10-CM

## 2023-02-22 LAB
BEAKER LAB AP INTRAOPERATIVE CONSULTATION: NORMAL
CYTO UR: NORMAL
LAB AP CASE REPORT: NORMAL
LAB AP CLINICAL INFORMATION: NORMAL
Lab: NORMAL
PATH REPORT.FINAL DX SPEC: NORMAL
PATH REPORT.GROSS SPEC: NORMAL

## 2023-02-22 RX ORDER — OLANZAPINE 10 MG/1
TABLET ORAL
Qty: 16 TABLET | Refills: 0 | Status: SHIPPED | OUTPATIENT
Start: 2023-02-22

## 2023-02-23 ENCOUNTER — TELEPHONE (OUTPATIENT)
Dept: OTOLARYNGOLOGY | Facility: CLINIC | Age: 68
End: 2023-02-23
Payer: MEDICARE

## 2023-02-23 NOTE — TELEPHONE ENCOUNTER
----- Message from YANNICK Leavitt sent at 2/22/2023  1:16 PM CST -----  [place] II benign   Repeat ultrasound in 6 months

## 2023-02-27 NOTE — PROGRESS NOTES
MEDICAL ONCOLOGY PROGRESS NOTE     Pt Name: Curt Masterson  MRN: [id number]  YOB: 1955  Date of evaluation: 3/2/2023      HISTORY OF PRESENT ILLNESS:    Reason for MD visit-toxicity assessment/disease management  She received her first cycle of neoadjuvant chemotherapy with dose dense AC. She tolerated treatment quite well except for mild fatigue. Diagnosis  Invasive ductal carcinoma, 1:00 right breast, Dec 2022  Grade 3  ER 99%, ME 0, HER-2 2+/equivocal, FISH-negative, Ki67 55%  MammaPrint: Luminal B/high risk. Invasive ductal carcinoma, 4:00 right breast, Dec 2022  ER 96%, %, HER-2 2+/equivocal, FISH-negative, Ki67 15%  MammaPrint: Luminal B/high risk. Grade 2  Mina 81 gene genetic panel: Negative for pathogenic mutations; VUS BRIP1    Treatment Summary  2/15/23 Initiated dose dense Adriamycin Cyclophosphamide + GCSF every 2 weeks x4 cycles followed by weekly Taxol x12 weeks  Anticipate surgery  Anticipate radiation therapy  Anticipate endocrine hormone therapy    Cancer History:  Cooper Carpenter was first seen by me 1/25/2023. She felt a palpable mass in the right breast.  Family history significant for history of breast cancer paternal grandmother. No prior use of hormone replacement therapy. 12/8/22 US right breast: Real-time ultrasound performed by technologist, with ultrasound images presented for radiologist interpretation. Additionally I scanned the patient myself. Palpable area of concern is located at 1:00 5 cm from the nipple. Ultrasound at this location demonstrates a heterogeneously hypoechoic round noncircumscribed mass with microlobulated and angular margins measuring 20 x 20 x 23 mm. There is an additional similar appearing mass questionably with direct extension from this main palpable mass versus a few small millimeters of intervening tissue. This second additional mass measures 16 x 2 x 11 mm.  The additional third mass is located at 4:00 11 cm from the nipple, also in similar morphological characteristics measuring 13 x 9 x 11 mm. Imaging of the right axilla demonstrates at least 2 sonographically abnormal lymph nodes. One measures 16 mm in long axis with favored cortical thickening up to approximately 4 mm. An additional abnormal lymph node demonstrates a residual hilum but with eccentric cortical thickening. This lymph node measures up to 12 mm in long axis. 12/8/22 Bilateral diagnostic mammogram: Today's examination consists of 2-D/3-D combo standard craniocaudal and oblique views of both breasts and right breast spot compression views. Comparison is made  prior breast imaging since May 16, 2016 . Breast parenchyma is heterogeneously dense . The palpable area of concern is located in the right breast around 12:00 which corresponds to a round microlobulated high density mass measuring 23 x 22 x 23 mm in length by width by height. Just anterior is an additional second similar appearing mass measuring approximately 15 x 13 x 9 mm in length by width by height. Additionally there is a third partially visualized mass in the extreme far posterior lower inner quadrant measuring approximately 13 mm. No additional suspicious finding is seen. A biopsy clip is noted in the left breast along with a stable postsurgical scar. The mammograms were evaluated using computer aided detection. Next, right breast ultrasound was performed. 12/19//22 Right breast, 1:00, core biopsies: Invasive carcinoma of no special type (ductal), grade 3. Largest contiguous area of tumor measures 16 mm. ER 99%, KS 0, HER-2 2+/equivocal, FISH-negative, Ki67 55%. MammaPrint: Luminal B/high risk. Right breast, 4:00, core biopsies: Invasive carcinoma of no special type (ductal), grade 2. Largest contiguous area of tumor measures 10 mm. ER 96%, %, HER-2 2+/equivocal, FISH-negative, Ki67 15%. MammaPrint: Luminal B/high risk. Lymph node, right axilla, fine-needle aspiration: Negative for malignant cells.  Benign adipose tissue. 1/11/23 MRI bilateral breast: RIGHT breast upper inner quadrant dominant mass, consistent with biopsy proven malignancy. There is nonmass enhancement extending to the medial skin, which appears to have subtle thickening, concerning for invasion. RIGHT breast lower inner quadrant 4:00 position mass, consistent with biopsy proven malignancy. Note that the posterior aspect of the mass is 0.4 cm from the chest wall. RIGHT breast upper outer quadrant with a 0.8 cm circumscribed mass, concerning for satellite disease. Overall appearance of the RIGHT breast concerning for multicentric disease. Suspicious low-lying RIGHT axillary lymph node as above. Although this was negative by fine-needle aspiration, the imaging appearance remains suspicious. No internal mammary or LEFT axillary adenopathy. LEFT 3 cm thyroid nodule, not optimally evaluated by MRI. If not previously performed, consider nonemergent thyroid ultrasound. BI-RADS Final Assessment Category 6: Known Biopsy-Proven Malignancy Management Recommendation: Surgical excision when clinically appropriate. 1/18/22 Mina 81 gene genetic panel: Negative for pathogenic mutations; VUS BRIP1  1/20/23 CT CHEST W CONTRAST  Multiple small subpleural nodules in the right upper lung field of indeterminate significance. Scattered borderline nonspecific intrathoracic adenopathy, as above. Suggest a follow-up chest CT in 3 months, and/or comparison with priors, if clinically appropriate. Lobulated mass lesions with punctate calcifications in the right breast. These are consistent with known malignant neoplasm of the right breast. Consider correlation with dedicated ultrasound and mammography, if clinically appropriate. A heterogenous hypodense nodule in the left lobe of the thyroid gland, and cannot exclude a small right thyroid nodule, as above, for which correlation with ultrasound is recommended.    1/20/23 CT ABDOMEN PELVIS W IV CONTRAST  Small sliding hiatal hernia is seen. Mild thickening versus an element of underdistension is seen in the cecum and ascending colon. This could reflect an element of colitis due to infectious / inflammatory process. No metastatic lesions are seen. No ascites. Details as above. 1/20/23 NM BONE SCAN WHOLE BODY: There is no evidence of osseous metastatic disease. 1/25/23-she was first seen by me. Essentially, clinical T3 N1 M0 disease. I recommend neoadjuvant chemotherapy. Treatment consent signed for dose dense Adriamycin Cyclophosphamide + GCSF every 2 weeks x4 cycles followed by weekly Taxol x12 weeks     1/30/23 US Thyroid ([facility]) A solid 4.4 x 2.9 x 3.0 cm nodule of the left thyroid lobe consistent with a TI RADS level 3 lesion for which fine-needle aspiration should be considered if not already performed at outside facility given the size greater than 2.5 cm. Mixed cystic solid right mid thyroid TI-RAD level 3 lesion for which one-year follow-up recommended. 2/21/23 Thyroid, left lobe, fine-needle aspiration [facility]): [place] category II: Benign follicular nodule.         1/18/22 Mina 80 gene genetic panel          Past Medical History:    Past Medical History:   Diagnosis Date    Cancer ([address] 12/09/2022    right breast    Fibrocystic breast disease     Hypertension     Post-menopausal        Past Surgical History:    Past Surgical History:   Procedure Laterality Date    BREAST BIOPSY  5/2011    left breast mammotome - benign fibrocystic disease    BREAST CYST EXCISION  1973    left breast - benign    PORT SURGERY N/A 2/14/2023    PORT INSERTION WITH FLUOROSCOPY performed by Bishnu Shankar MD at [address] [place] [address] RIGHT Right 12/19/2022    US BREAST NEEDLE BIOPSY RIGHT 12/19/2022 [facility]AL SURGERY       Social History:    Marital status:   Smoking status:No  ETOH status:Occasionally ;1 drink  monthly  Resides: [place], [place]    Family History:   Family History   Problem Relation Age of Onset    Prostate Cancer Father         Age Unknown    Breast Cancer Paternal Grandmother     Cancer Paternal Grandfather         Prostate       Current Hospital Medications:    Current Outpatient Medications   Medication Sig Dispense Refill    OLANZapine (ZYPREXA) 10 MG tablet TAKE 1 TABLET BY MOUTH EVERY NIGHT AT BEDTIME X 4 NIGHTS WITH EACH CYCLE OF CHEMO EVERY 2 WEEKS 16 tablet 0    mupirocin (BACTROBAN) 2 % ointment Apply to bilateral nostrils BID for 5 days prior to surgery 30 g 0    lidocaine-prilocaine (EMLA) 2.5-2.5 % cream Apply topically as needed. 30 g 5    ondansetron (ZOFRAN) 4 MG tablet Take 1 tablet by mouth every 6 hours as needed for Nausea or Vomiting 30 tablet 5    promethazine (PHENERGAN) 25 MG tablet Take 0.5 tablets by mouth every 6 hours as needed for Nausea 30 tablet 5    montelukast (SINGULAIR) 10 MG tablet Take 1 tablet by mouth daily (Patient taking differently: Take 10 mg by mouth nightly) 30 tablet 3    lisinopril-hydrochlorothiazide (PRINZIDE;ZESTORETIC) 10-12.5 MG per tablet Take 1 tablet by mouth nightly      aspirin 81 MG EC tablet Take 81 mg by mouth daily. No current facility-administered medications for this visit.      Facility-Administered Medications Ordered in Other Visits   Medication Dose Route Frequency Provider Last Rate Last Admin    alteplase (CATHFLO) 2 mg in sterile water 2 mL injection  2 mg IntraCATHeter PRROCÍO Gr MD   2 mg at 03/02/23 1113    sodium chloride (PF) 0.9 % injection 5-40 mL  5-40 mL IntraVENous PRROCÍO Gr MD   10 mL at 03/02/23 1359    heparin flush 100 UNIT/ML injection 500 Units  500 Units IntraCATHeter PRROCÍO Gr MD   500 Units at 03/02/23 1359       Allergies: No Known Allergies      Subjective   REVIEW OF SYSTEMS:   CONSTITUTIONAL: no fever, no night sweats, fatigue;  HEENT: no blurring of vision, no double vision, no hearing difficulty, no tinnitus, no ulceration, no dysplasia, no epistaxis;  LUNGS: no cough, no hemoptysis, no wheeze,  no shortness of breath;  CARDIOVASCULAR: no palpitation, no chest pain, no shortness of breath;  GI: no abdominal pain,  nausea, no vomiting, no diarrhea, constipation;  MERCY: no dysuria, no hematuria, no frequency or urgency, no nephrolithiasis;  MUSCULOSKELETAL: no joint pain, no swelling, no stiffness;  ENDOCRINE: no polyuria, no polydipsia, no cold or heat intolerance;  HEMATOLOGY: no easy bruising or bleeding, no history of clotting disorder;  DERMATOLOGY: no skin rash, no eczema, no pruritus;  PSYCHIATRY: no depression, no anxiety, no panic attacks, no suicidal ideation, no homicidal ideation;  NEUROLOGY: no syncope, no seizures, no numbness or tingling of hands, no numbness or tingling of feet, no paresis;    Objective   BP (!) 141/86   Pulse 77   Temp 99 °F (37.2 °C)   Resp 18   Ht 5' 8\" (1.727 m)   Wt 177 lb 9.6 oz (80.6 kg)   SpO2 98%   BMI 27.00 kg/m²     PHYSICAL EXAM:  CONSTITUTIONAL: Alert, appropriate, no acute distress  EYES: Non icteric, EOM intact, pupils equal round   ENT: Mucus membranes moist, no oral pharyngeal lesions, external inspection of ears and nose are normal  NECK: Supple, no masses. No palpable thyroid mass  CHEST/LUNGS: CTA bilaterally, normal respiratory effort   CARDIOVASCULAR: RRR, no murmurs. No lower extremity edema  ABDOMEN: soft non-tender, active bowel sounds, no HSM. No palpable masses  EXTREMITIES: warm, full ROM in all 4 extremities, no focal weakness. SKIN: warm, dry with no rashes or lesions  LYMPH: No cervical, clavicular, axillary, or inguinal lymphadenopathy  NEUROLOGIC: follows commands, non focal   PSYCH: mood and affect appropriate. Alert and oriented to time, place, person    LABORATORY RESULTS REVIEWED/ANALYZED BY ME:  2/21/23 Thyroid, left lobe, fine-needle aspiration [facility]): [place] category II: Benign follicular nodule.   Lab Results   Component Value Date    WBC 9.74 03/02/2023    HGB 12.4 03/02/2023    HCT 40.0 03/02/2023    MCV 77.8 (L) 03/02/2023     (L) 03/02/2023     Lab Results   Component Value Date    NEUTROABS 5.45 03/02/2023     Lab Results   Component Value Date     03/02/2023    K 4.2 03/02/2023     03/02/2023    CO2 32 (H) 03/02/2023    BUN 13 03/02/2023    CREATININE 0.6 03/02/2023    GLUCOSE 116 (H) 03/02/2023    CALCIUM 9.6 03/02/2023    PROT 6.2 (L) 03/02/2023    LABALBU 3.9 03/02/2023    BILITOT 0.3 03/02/2023    ALKPHOS 69 03/02/2023    AST 24 03/02/2023    ALT 20 03/02/2023    LABGLOM >60 03/02/2023    GLOB 2.4 03/02/2023       RADIOLOGY STUDIES REVIEWED BY ME:  1/30/23 US Thyroid ([facility]) A solid 4.4 x 2.9 x 3.0 cm nodule of the left thyroid lobe consistent with a TI RADS level 3 lesion for which fine-needle aspiration should be considered if not already performed at outside facility given the size greater than 2.5 cm.  Mixed cystic solid right mid thyroid TI-RAD level 3 lesion for which one-year follow-up recommended.       ASSESSMENT:    Orders Placed This Encounter   Procedures    CBC with Auto Differential     Standing Status:   Future     Number of Occurrences:   1     Standing Expiration Date:   3/2/2024    Comprehensive Metabolic Panel     Standing Status:   Future     Number of Occurrences:   1     Standing Expiration Date:   3/2/2024    CBC With Auto Differential     Standing Status:   Future     Standing Expiration Date:   3/2/2024    Comprehensive metabolic panel     Standing Status:   Future     Standing Expiration Date:   3/2/2024    Echo 2d w doppler w color w contrast     Standing Status:   Future     Standing Expiration Date:   3/2/2024     Scheduling Instructions:      Sched Next available     Order Specific Question:   Reason for exam:     Answer:   THERAPEUTIC DRUG MONITORING          Katie was seen today for cancer.    Diagnoses and all orders for this visit:    Malignant neoplasm of upper-inner quadrant of right breast in female, estrogen  receptor positive ([facility]  -     CBC with Auto Differential; Future  -     Comprehensive Metabolic Panel; Future  -     CBC With Auto Differential; Future  -     Comprehensive metabolic panel; Future  -     Cancel: 0.9 % sodium chloride bolus  -     Cancel: fosaprepitant (EMEND) 150 mg in sodium chloride 0.9 % 150 mL IVPB  -     Cancel: palonosetron (ALOXI) injection 0.25 mg  -     Cancel: DOXOrubicin HCl (ADRIAMYCIN) 118 mg in sodium chloride 0.9 % 100 mL chemo IVPB  -     Cancel: cyclophosphamide (CYTOXAN) 1,180 mg in sodium chloride 0.9 % 250 mL chemo IVPB  -     Cancel: sodium chloride (PF) 0.9 % injection 5-40 mL  -     Cancel: heparin flush 100 UNIT/ML injection 500 Units    Care plan discussed with patient    Malignant neoplasm of lower-inner quadrant of right breast of female, estrogen receptor positive ([facility]  -     CBC With Auto Differential; Future  -     Comprehensive metabolic panel;  Future  -     Cancel: 0.9 % sodium chloride bolus  -     Cancel: fosaprepitant (EMEND) 150 mg in sodium chloride 0.9 % 150 mL IVPB  -     Cancel: palonosetron (ALOXI) injection 0.25 mg  -     Cancel: DOXOrubicin HCl (ADRIAMYCIN) 118 mg in sodium chloride 0.9 % 100 mL chemo IVPB  -     Cancel: cyclophosphamide (CYTOXAN) 1,180 mg in sodium chloride 0.9 % 250 mL chemo IVPB  -     Cancel: sodium chloride (PF) 0.9 % injection 5-40 mL  -     Cancel: heparin flush 100 UNIT/ML injection 500 Units    Chemotherapy-induced fatigue    Encounter for therapeutic drug monitoring  -     Echo 2d w doppler w color w contrast; Future    Need for prophylactic chemotherapy  -     Echo 2d w doppler w color w contrast; Future    Other orders  -     0.9 % sodium chloride infusion  -     Cancel: dexamethasone (DECADRON) 10 mg in sodium chloride 0.9 % 50 mL IVPB  -     0.9 % sodium chloride infusion  -     0.9 % sodium chloride infusion  -     diphenhydrAMINE (BENADRYL) injection 50 mg  -     famotidine (PEPCID) injection 20 mg  -     hydrocortisone sodium succinate PF (SOLU-CORTEF) injection 100 mg  -     acetaminophen (TYLENOL) tablet 650 mg  -     meperidine (DEMEROL) injection 12.5 mg  -     ondansetron (ZOFRAN) injection 8 mg  -     EPINEPHrine PF 1 MG/ML injection (Anaphylaxis) 0.3 mg  -     albuterol sulfate HFA (PROVENTIL;VENTOLIN;PROAIR) 108 (90 Base) MCG/ACT inhaler 4 puff  -     pegfilgrastim-jmdb (FULPHILA) injection 6 mg     Clinical T3 N0 M0 invasive ductal carcinoma, 1:00 right breast, Grade 3, ER 99%, [place] 0, HER-2 2+/equivocal, FISH-negative, Ki67 55%, MammaPrint: Luminal B/high risk, Dec 2022  Invasive ductal carcinoma, 4:00 right breast, Grade 2, ER 96%, %, HER-2 2+/equivocal, FISH-negative, Ki67 15%, MammaPrint: Luminal B/high risk, Dec 2022  Essentially, local advanced disease. Recommended neoadjuvant chemotherapy dose dense AC with G-CSF support followed by 12 weekly cycles of Taxol  -Continue cycle #2 chemotherapy with dose dense AC    Genetic assessment  1/18/22 [employer] gene genetic panel: Negative for pathogenic mutations; VUS BRIP1    Thyroid nodule-TSH, free T4 and ultrasound thyroid.   Following ENT    Pulmonary nodules-repeat CT chest 4-month, around May 2023      PLAN:  RTC with MD in treatment room 6 weeks  CBC, CMP today  Continue C#2 dose dense Adriamycin Cyclophosphamide + GCSF every 2 weeks x4 cycles followed by weekly Taxol x12 weeks today  Recommend 2 D Echo scheduled  Continue Zofran 4mg every 6 hrs as needed  Continue Phenergan 12.5mg every 6 hrs as needed  Continue Olanzapine 10mg nightly x4 nights with each cycle dose dense [place] Cytoxan  Continue cold gloves/socks with weekly Taxol  Continue follow-up with  Dr Mary Anne Kim/[facility] ENT for thyroid nodule, 8/9/23  Repeat CT chest 3 months, May 2023  Continue follow-up with Jacqueline Kiser PA-C/General Surgery, 4/7/23      Anne NAIDU am pre charting  as Medical Assistant for Claudette Canter, MD. Electronically signed by Anne Burns MA on 3/2/2023 at 4:27 PM Estella Daniels, am scribing for Sheri Jovel MD. Electronically signed by Barrington Clayton RN on 3/2/2023 at 11:25 AM CST. I, Dr Madi Orozco, personally performed the services described in this documentation as scribed by Barrington Clayton RN in my presence and is both accurate and complete. I have seen, examined and reviewed this patient medication list, appropriate labs and imaging studies. I reviewed relevant medical records and others physicians notes. I discussed the plans of care with the patient. I answered all the questions to the patients satisfaction. I have also reviewed the chief complaint (CC) and part of the history (History of Present Illness (HPI), Past Family Social History [facility]), or Review of Systems (ROS) and made changes when appropriated. (Please note that portions of this note were completed with a voice recognition program. Efforts were made to edit the dictations but occasionally words are mis-transcribed. )Electronically signed by Sheri Jovel MD on 3/2/2023 at 5:41 PM

## 2023-03-02 ENCOUNTER — SOCIAL WORK (OUTPATIENT)
Dept: HEMATOLOGY | Age: 68
End: 2023-03-02

## 2023-03-02 ENCOUNTER — HOSPITAL ENCOUNTER (OUTPATIENT)
Dept: INFUSION THERAPY | Age: 68
Discharge: HOME | End: 2023-03-02
Payer: MEDICARE

## 2023-03-02 ENCOUNTER — DOCUMENTATION (OUTPATIENT)
Dept: CT IMAGING | Facility: HOSPITAL | Age: 68
End: 2023-03-02
Payer: MEDICARE

## 2023-03-02 ENCOUNTER — OFFICE VISIT (OUTPATIENT)
Dept: HEMATOLOGY | Age: 68
End: 2023-03-02
Payer: MEDICARE

## 2023-03-02 VITALS
RESPIRATION RATE: 18 BRPM | OXYGEN SATURATION: 98 % | BODY MASS INDEX: 26.92 KG/M2 | TEMPERATURE: 99 F | HEIGHT: 68 IN | DIASTOLIC BLOOD PRESSURE: 86 MMHG | SYSTOLIC BLOOD PRESSURE: 141 MMHG | WEIGHT: 177.6 LBS | HEART RATE: 77 BPM

## 2023-03-02 DIAGNOSIS — Z79.899 NEED FOR PROPHYLACTIC CHEMOTHERAPY: ICD-10-CM

## 2023-03-02 DIAGNOSIS — Z17.0 MALIGNANT NEOPLASM OF UPPER-INNER QUADRANT OF RIGHT BREAST IN FEMALE, ESTROGEN RECEPTOR POSITIVE (HCC): Primary | ICD-10-CM

## 2023-03-02 DIAGNOSIS — Z17.0 MALIGNANT NEOPLASM OF LOWER-INNER QUADRANT OF RIGHT BREAST OF FEMALE, ESTROGEN RECEPTOR POSITIVE (HCC): ICD-10-CM

## 2023-03-02 DIAGNOSIS — Z51.81 ENCOUNTER FOR THERAPEUTIC DRUG MONITORING: ICD-10-CM

## 2023-03-02 DIAGNOSIS — C50.311 MALIGNANT NEOPLASM OF LOWER-INNER QUADRANT OF RIGHT BREAST OF FEMALE, ESTROGEN RECEPTOR POSITIVE (HCC): ICD-10-CM

## 2023-03-02 DIAGNOSIS — T45.1X5A CHEMOTHERAPY-INDUCED FATIGUE: ICD-10-CM

## 2023-03-02 DIAGNOSIS — C50.211 MALIGNANT NEOPLASM OF UPPER-INNER QUADRANT OF RIGHT BREAST IN FEMALE, ESTROGEN RECEPTOR POSITIVE (HCC): Primary | ICD-10-CM

## 2023-03-02 DIAGNOSIS — R53.83 CHEMOTHERAPY-INDUCED FATIGUE: ICD-10-CM

## 2023-03-02 DIAGNOSIS — Z71.89 CARE PLAN DISCUSSED WITH PATIENT: ICD-10-CM

## 2023-03-02 LAB
ALBUMIN SERPL-MCNC: 3.9 G/DL (ref 3.5–5.2)
ALP BLD-CCNC: 69 U/L (ref 35–104)
ALT SERPL-CCNC: 20 U/L (ref 9–52)
ANION GAP SERPL CALCULATED.3IONS-SCNC: 3 MMOL/L (ref 7–19)
AST SERPL-CCNC: 24 U/L (ref 14–36)
BILIRUB SERPL-MCNC: 0.3 MG/DL (ref 0.2–1.3)
BUN BLDV-MCNC: 13 MG/DL (ref 7–17)
CALCIUM SERPL-MCNC: 9.6 MG/DL (ref 8.4–10.2)
CHLORIDE BLD-SCNC: 107 MMOL/L (ref 98–111)
CO2: 32 MMOL/L (ref 22–29)
CREAT SERPL-MCNC: 0.6 MG/DL (ref 0.5–1)
GFR SERPL CREATININE-BSD FRML MDRD: >60 ML/MIN/{1.73_M2}
GLOBULIN: 2.4 G/DL
GLUCOSE BLD-MCNC: 116 MG/DL (ref 74–106)
HCT VFR BLD CALC: 40 % (ref 34.1–44.9)
HEMOGLOBIN: 12.4 G/DL (ref 11.2–15.7)
LYMPHOCYTES ABSOLUTE: 2.15 K/UL (ref 1.18–3.74)
LYMPHOCYTES RELATIVE PERCENT: 22.1 % (ref 19.3–53.1)
MCH RBC QN AUTO: 24.1 PG (ref 25.6–32.2)
MCHC RBC AUTO-ENTMCNC: 31 G/DL (ref 32.3–35.5)
MCV RBC AUTO: 77.8 FL (ref 79.4–94.8)
MONOCYTES ABSOLUTE: 0.67 K/UL (ref 0.24–0.82)
MONOCYTES RELATIVE PERCENT: 6.9 % (ref 4.7–12.5)
NEUTROPHILS ABSOLUTE: 5.45 K/UL (ref 1.56–6.13)
NEUTROPHILS RELATIVE PERCENT: 56 % (ref 34–71.1)
PDW BLD-RTO: 14.4 % (ref 11.7–14.4)
PLATELET # BLD: 155 K/UL (ref 182–369)
PMV BLD AUTO: 9.4 FL (ref 7.4–10.4)
POTASSIUM SERPL-SCNC: 4.2 MMOL/L (ref 3.5–5.1)
RBC # BLD: 5.14 M/UL (ref 3.93–5.22)
SODIUM BLD-SCNC: 142 MMOL/L (ref 137–145)
TOTAL PROTEIN: 6.2 G/DL (ref 6.3–8.2)
WBC # BLD: 9.74 K/UL (ref 3.98–10.04)

## 2023-03-02 PROCEDURE — 36415 COLL VENOUS BLD VENIPUNCTURE: CPT

## 2023-03-02 PROCEDURE — 96413 CHEMO IV INFUSION 1 HR: CPT

## 2023-03-02 PROCEDURE — 96360 HYDRATION IV INFUSION INIT: CPT

## 2023-03-02 PROCEDURE — 1123F ACP DISCUSS/DSCN MKR DOCD: CPT | Performed by: INTERNAL MEDICINE

## 2023-03-02 PROCEDURE — 96417 CHEMO IV INFUS EACH ADDL SEQ: CPT

## 2023-03-02 PROCEDURE — 80053 COMPREHEN METABOLIC PANEL: CPT

## 2023-03-02 PROCEDURE — 96361 HYDRATE IV INFUSION ADD-ON: CPT

## 2023-03-02 PROCEDURE — 85025 COMPLETE CBC W/AUTO DIFF WBC: CPT

## 2023-03-02 PROCEDURE — 6360000002 HC RX W HCPCS: Performed by: INTERNAL MEDICINE

## 2023-03-02 PROCEDURE — 96375 TX/PRO/DX INJ NEW DRUG ADDON: CPT

## 2023-03-02 PROCEDURE — 96367 TX/PROPH/DG ADDL SEQ IV INF: CPT

## 2023-03-02 PROCEDURE — 2580000003 HC RX 258: Performed by: INTERNAL MEDICINE

## 2023-03-02 PROCEDURE — 99214 OFFICE O/P EST MOD 30 MIN: CPT | Performed by: INTERNAL MEDICINE

## 2023-03-02 RX ORDER — HEPARIN SODIUM (PORCINE) LOCK FLUSH IV SOLN 100 UNIT/ML 100 UNIT/ML
500 SOLUTION INTRAVENOUS PRN
Status: CANCELLED | OUTPATIENT
Start: 2023-03-02

## 2023-03-02 RX ORDER — ALBUTEROL SULFATE 90 UG/1
4 AEROSOL, METERED RESPIRATORY (INHALATION) PRN
Status: CANCELLED | OUTPATIENT
Start: 2023-03-02

## 2023-03-02 RX ORDER — PALONOSETRON 0.05 MG/ML
0.25 INJECTION, SOLUTION INTRAVENOUS ONCE
Status: CANCELLED | OUTPATIENT
Start: 2023-03-02 | End: 2023-03-02

## 2023-03-02 RX ORDER — SODIUM CHLORIDE 9 MG/ML
5-40 INJECTION INTRAVENOUS PRN
Status: DISCONTINUED | OUTPATIENT
Start: 2023-03-02 | End: 2023-03-03 | Stop reason: HOSPADM

## 2023-03-02 RX ORDER — 0.9 % SODIUM CHLORIDE 0.9 %
1000 INTRAVENOUS SOLUTION INTRAVENOUS ONCE
Status: COMPLETED | OUTPATIENT
Start: 2023-03-02 | End: 2023-03-02

## 2023-03-02 RX ORDER — SODIUM CHLORIDE 9 MG/ML
5-250 INJECTION, SOLUTION INTRAVENOUS PRN
Status: CANCELLED | OUTPATIENT
Start: 2023-03-02

## 2023-03-02 RX ORDER — ACETAMINOPHEN 325 MG/1
650 TABLET ORAL
Status: CANCELLED | OUTPATIENT
Start: 2023-03-02

## 2023-03-02 RX ORDER — FAMOTIDINE 10 MG/ML
20 INJECTION, SOLUTION INTRAVENOUS
Status: CANCELLED | OUTPATIENT
Start: 2023-03-02

## 2023-03-02 RX ORDER — EPINEPHRINE 1 MG/ML
0.3 INJECTION, SOLUTION, CONCENTRATE INTRAVENOUS PRN
Status: CANCELLED | OUTPATIENT
Start: 2023-03-02

## 2023-03-02 RX ORDER — MEPERIDINE HYDROCHLORIDE 50 MG/ML
12.5 INJECTION INTRAMUSCULAR; INTRAVENOUS; SUBCUTANEOUS PRN
Status: CANCELLED | OUTPATIENT
Start: 2023-03-02

## 2023-03-02 RX ORDER — 0.9 % SODIUM CHLORIDE 0.9 %
1000 INTRAVENOUS SOLUTION INTRAVENOUS ONCE
Status: CANCELLED
Start: 2023-03-02 | End: 2023-03-02

## 2023-03-02 RX ORDER — DIPHENHYDRAMINE HYDROCHLORIDE 50 MG/ML
50 INJECTION INTRAMUSCULAR; INTRAVENOUS
Status: CANCELLED | OUTPATIENT
Start: 2023-03-02

## 2023-03-02 RX ORDER — HEPARIN SODIUM (PORCINE) LOCK FLUSH IV SOLN 100 UNIT/ML 100 UNIT/ML
500 SOLUTION INTRAVENOUS PRN
Status: DISCONTINUED | OUTPATIENT
Start: 2023-03-02 | End: 2023-03-03 | Stop reason: HOSPADM

## 2023-03-02 RX ORDER — ONDANSETRON 2 MG/ML
8 INJECTION INTRAMUSCULAR; INTRAVENOUS
Status: CANCELLED | OUTPATIENT
Start: 2023-03-02

## 2023-03-02 RX ORDER — PALONOSETRON 0.05 MG/ML
0.25 INJECTION, SOLUTION INTRAVENOUS ONCE
Status: COMPLETED | OUTPATIENT
Start: 2023-03-02 | End: 2023-03-02

## 2023-03-02 RX ORDER — SODIUM CHLORIDE 9 MG/ML
5-40 INJECTION INTRAVENOUS PRN
Status: CANCELLED | OUTPATIENT
Start: 2023-03-02

## 2023-03-02 RX ORDER — SODIUM CHLORIDE 9 MG/ML
INJECTION, SOLUTION INTRAVENOUS CONTINUOUS
Status: CANCELLED | OUTPATIENT
Start: 2023-03-02

## 2023-03-02 RX ADMIN — DEXAMETHASONE SODIUM PHOSPHATE: 10 INJECTION, SOLUTION INTRAMUSCULAR; INTRAVENOUS at 12:02

## 2023-03-02 RX ADMIN — HEPARIN 500 UNITS: 100 SYRINGE at 13:59

## 2023-03-02 RX ADMIN — SODIUM CHLORIDE 1000 ML: 9 INJECTION, SOLUTION INTRAVENOUS at 11:54

## 2023-03-02 RX ADMIN — PALONOSETRON HYDROCHLORIDE 0.25 MG: 0.25 INJECTION, SOLUTION INTRAVENOUS at 12:02

## 2023-03-02 RX ADMIN — SODIUM CHLORIDE 118 MG: 9 INJECTION, SOLUTION INTRAVENOUS at 12:49

## 2023-03-02 RX ADMIN — FOSAPREPITANT DIMEGLUMINE 150 MG: 150 INJECTION, POWDER, LYOPHILIZED, FOR SOLUTION INTRAVENOUS at 12:12

## 2023-03-02 RX ADMIN — CYCLOPHOSPHAMIDE 1180 MG: 1 INJECTION, POWDER, FOR SOLUTION INTRAVENOUS; ORAL at 13:09

## 2023-03-02 RX ADMIN — SODIUM CHLORIDE, PRESERVATIVE FREE 10 ML: 5 INJECTION INTRAVENOUS at 13:59

## 2023-03-02 RX ADMIN — ALTEPLASE 2 MG: 2.2 INJECTION, POWDER, LYOPHILIZED, FOR SOLUTION INTRAVENOUS at 11:13

## 2023-03-02 NOTE — PROGRESS NOTES
Lab Results   Component Value Date    WBC 9.74 03/02/2023    HGB 12.4 03/02/2023    HCT 40.0 03/02/2023    MCV 77.8 (L) 03/02/2023     (L) 03/02/2023     Lab Results   Component Value Date    NEUTROABS 5.45 03/02/2023     Lab Results   Component Value Date     03/02/2023    K 4.2 03/02/2023     03/02/2023    CO2 32 (H) 03/02/2023    BUN 13 03/02/2023    CREATININE 0.6 03/02/2023    GLUCOSE 116 (H) 03/02/2023    CALCIUM 9.6 03/02/2023    PROT 6.2 (L) 03/02/2023    LABALBU 3.9 03/02/2023    BILITOT 0.3 03/02/2023    ALKPHOS 69 03/02/2023    AST 24 03/02/2023    ALT 20 03/02/2023    LABGLOM >60 03/02/2023    GLOB 2.4 03/02/2023

## 2023-03-02 NOTE — PROGRESS NOTES
ADILENE Tee completed initial treatment visit with patient. SW introduced self and explained role and source of support. Patient states they are feeling good and has mild nausea and lethargy following her first treatment. Patient has a great support system in her family and friends. SW provided support through active listening and discussion. Patient denies needs or concerns at this time. SW encouraged the patient to call if assistance is needed in the future.

## 2023-03-03 ENCOUNTER — HOSPITAL ENCOUNTER (OUTPATIENT)
Dept: INFUSION THERAPY | Age: 68
Discharge: HOME | End: 2023-03-03
Payer: MEDICARE

## 2023-03-03 DIAGNOSIS — C50.211 MALIGNANT NEOPLASM OF UPPER-INNER QUADRANT OF RIGHT BREAST IN FEMALE, ESTROGEN RECEPTOR POSITIVE (HCC): Primary | ICD-10-CM

## 2023-03-03 DIAGNOSIS — Z17.0 MALIGNANT NEOPLASM OF UPPER-INNER QUADRANT OF RIGHT BREAST IN FEMALE, ESTROGEN RECEPTOR POSITIVE (HCC): Primary | ICD-10-CM

## 2023-03-03 DIAGNOSIS — C50.311 MALIGNANT NEOPLASM OF LOWER-INNER QUADRANT OF RIGHT BREAST OF FEMALE, ESTROGEN RECEPTOR POSITIVE (HCC): ICD-10-CM

## 2023-03-03 DIAGNOSIS — Z17.0 MALIGNANT NEOPLASM OF LOWER-INNER QUADRANT OF RIGHT BREAST OF FEMALE, ESTROGEN RECEPTOR POSITIVE (HCC): ICD-10-CM

## 2023-03-03 PROCEDURE — 6360000002 HC RX W HCPCS: Performed by: INTERNAL MEDICINE

## 2023-03-03 PROCEDURE — 96372 THER/PROPH/DIAG INJ SC/IM: CPT

## 2023-03-03 RX ADMIN — PEGFILGRASTIM 6 MG: 6 INJECTION SUBCUTANEOUS at 13:11

## 2023-03-13 ENCOUNTER — HOSPITAL ENCOUNTER (OUTPATIENT)
Dept: NON INVASIVE DIAGNOSTICS | Age: 68
Discharge: HOME | End: 2023-03-13
Payer: MEDICARE

## 2023-03-13 DIAGNOSIS — Z79.899 NEED FOR PROPHYLACTIC CHEMOTHERAPY: ICD-10-CM

## 2023-03-13 DIAGNOSIS — C50.311 MALIGNANT NEOPLASM OF LOWER-INNER QUADRANT OF RIGHT BREAST OF FEMALE, ESTROGEN RECEPTOR POSITIVE (HCC): Primary | ICD-10-CM

## 2023-03-13 DIAGNOSIS — Z17.0 MALIGNANT NEOPLASM OF LOWER-INNER QUADRANT OF RIGHT BREAST OF FEMALE, ESTROGEN RECEPTOR POSITIVE (HCC): Primary | ICD-10-CM

## 2023-03-13 DIAGNOSIS — Z51.81 ENCOUNTER FOR THERAPEUTIC DRUG MONITORING: ICD-10-CM

## 2023-03-13 LAB
LV EF: 60 %
LVEF MODALITY: NORMAL

## 2023-03-13 PROCEDURE — 93356 MYOCRD STRAIN IMG SPCKL TRCK: CPT

## 2023-03-13 PROCEDURE — 93306 TTE W/DOPPLER COMPLETE: CPT

## 2023-03-16 ENCOUNTER — HOSPITAL ENCOUNTER (OUTPATIENT)
Dept: INFUSION THERAPY | Age: 68
Discharge: HOME | End: 2023-03-16
Payer: MEDICARE

## 2023-03-16 VITALS
RESPIRATION RATE: 18 BRPM | SYSTOLIC BLOOD PRESSURE: 141 MMHG | WEIGHT: 177.6 LBS | DIASTOLIC BLOOD PRESSURE: 73 MMHG | OXYGEN SATURATION: 99 % | BODY MASS INDEX: 26.92 KG/M2 | HEIGHT: 68 IN | TEMPERATURE: 98 F | HEART RATE: 69 BPM

## 2023-03-16 DIAGNOSIS — Z17.0 MALIGNANT NEOPLASM OF LOWER-INNER QUADRANT OF RIGHT BREAST OF FEMALE, ESTROGEN RECEPTOR POSITIVE (HCC): ICD-10-CM

## 2023-03-16 DIAGNOSIS — C50.311 MALIGNANT NEOPLASM OF LOWER-INNER QUADRANT OF RIGHT BREAST OF FEMALE, ESTROGEN RECEPTOR POSITIVE (HCC): ICD-10-CM

## 2023-03-16 DIAGNOSIS — Z17.0 MALIGNANT NEOPLASM OF UPPER-INNER QUADRANT OF RIGHT BREAST IN FEMALE, ESTROGEN RECEPTOR POSITIVE (HCC): Primary | ICD-10-CM

## 2023-03-16 DIAGNOSIS — C50.211 MALIGNANT NEOPLASM OF UPPER-INNER QUADRANT OF RIGHT BREAST IN FEMALE, ESTROGEN RECEPTOR POSITIVE (HCC): Primary | ICD-10-CM

## 2023-03-16 LAB
ALBUMIN SERPL-MCNC: 3.7 G/DL (ref 3.5–5.2)
ALP SERPL-CCNC: 70 U/L (ref 35–104)
ALT SERPL-CCNC: 23 U/L (ref 9–52)
ANION GAP SERPL CALCULATED.3IONS-SCNC: 5 MMOL/L (ref 7–19)
AST SERPL-CCNC: 26 U/L (ref 14–36)
BILIRUB SERPL-MCNC: <0.2 MG/DL (ref 0.2–1.3)
BUN SERPL-MCNC: 11 MG/DL (ref 7–17)
CALCIUM SERPL-MCNC: 9.4 MG/DL (ref 8.4–10.2)
CHLORIDE SERPL-SCNC: 107 MMOL/L (ref 98–111)
CO2 SERPL-SCNC: 30 MMOL/L (ref 22–29)
CREAT SERPL-MCNC: 0.6 MG/DL (ref 0.5–1)
ERYTHROCYTE [DISTWIDTH] IN BLOOD BY AUTOMATED COUNT: 16 % (ref 11.7–14.4)
GLOBULIN: 2.1 G/DL
GLUCOSE SERPL-MCNC: 129 MG/DL (ref 74–106)
HCT VFR BLD AUTO: 34.9 % (ref 34.1–44.9)
HGB BLD-MCNC: 11 G/DL (ref 11.2–15.7)
LYMPHOCYTES # BLD: 1.86 K/UL (ref 1.18–3.74)
LYMPHOCYTES NFR BLD: 7.8 % (ref 19.3–53.1)
MCH RBC QN AUTO: 24.3 PG (ref 25.6–32.2)
MCHC RBC AUTO-ENTMCNC: 31.5 G/DL (ref 32.3–35.5)
MCV RBC AUTO: 77.2 FL (ref 79.4–94.8)
MONOCYTES # BLD: 1.37 K/UL (ref 0.24–0.82)
MONOCYTES NFR BLD: 5.7 % (ref 4.7–12.5)
NEUTROPHILS # BLD: 15.44 K/UL (ref 1.56–6.13)
NEUTS SEG NFR BLD: 64.5 % (ref 34–71.1)
PLATELET # BLD AUTO: 139 K/UL (ref 182–369)
PMV BLD AUTO: 8.9 FL (ref 7.4–10.4)
POTASSIUM SERPL-SCNC: 3.6 MMOL/L (ref 3.5–5.1)
PROT SERPL-MCNC: 5.7 G/DL (ref 6.3–8.2)
RBC # BLD AUTO: 4.52 M/UL (ref 3.93–5.22)
SODIUM SERPL-SCNC: 142 MMOL/L (ref 137–145)
WBC # BLD AUTO: 23.93 K/UL (ref 3.98–10.04)

## 2023-03-16 PROCEDURE — 96367 TX/PROPH/DG ADDL SEQ IV INF: CPT

## 2023-03-16 PROCEDURE — 36415 COLL VENOUS BLD VENIPUNCTURE: CPT

## 2023-03-16 PROCEDURE — 80053 COMPREHEN METABOLIC PANEL: CPT

## 2023-03-16 PROCEDURE — 2580000003 HC RX 258: Performed by: INTERNAL MEDICINE

## 2023-03-16 PROCEDURE — 96360 HYDRATION IV INFUSION INIT: CPT

## 2023-03-16 PROCEDURE — 96417 CHEMO IV INFUS EACH ADDL SEQ: CPT

## 2023-03-16 PROCEDURE — 96375 TX/PRO/DX INJ NEW DRUG ADDON: CPT

## 2023-03-16 PROCEDURE — 85025 COMPLETE CBC W/AUTO DIFF WBC: CPT

## 2023-03-16 PROCEDURE — 96361 HYDRATE IV INFUSION ADD-ON: CPT

## 2023-03-16 PROCEDURE — 96413 CHEMO IV INFUSION 1 HR: CPT

## 2023-03-16 PROCEDURE — 6360000002 HC RX W HCPCS: Performed by: INTERNAL MEDICINE

## 2023-03-16 RX ORDER — PALONOSETRON 0.05 MG/ML
0.25 INJECTION, SOLUTION INTRAVENOUS ONCE
Status: CANCELLED | OUTPATIENT
Start: 2023-03-16 | End: 2023-03-16

## 2023-03-16 RX ORDER — EPINEPHRINE 1 MG/ML
0.3 INJECTION, SOLUTION, CONCENTRATE INTRAVENOUS PRN
Status: CANCELLED | OUTPATIENT
Start: 2023-03-16

## 2023-03-16 RX ORDER — SODIUM CHLORIDE 9 MG/ML
5-250 INJECTION, SOLUTION INTRAVENOUS PRN
Status: CANCELLED | OUTPATIENT
Start: 2023-03-16

## 2023-03-16 RX ORDER — ONDANSETRON 2 MG/ML
8 INJECTION INTRAMUSCULAR; INTRAVENOUS
Status: CANCELLED | OUTPATIENT
Start: 2023-03-16

## 2023-03-16 RX ORDER — SODIUM CHLORIDE 9 MG/ML
5-40 INJECTION INTRAVENOUS PRN
Status: CANCELLED | OUTPATIENT
Start: 2023-03-16

## 2023-03-16 RX ORDER — SODIUM CHLORIDE 9 MG/ML
INJECTION, SOLUTION INTRAVENOUS CONTINUOUS
Status: CANCELLED | OUTPATIENT
Start: 2023-03-16

## 2023-03-16 RX ORDER — HEPARIN SODIUM (PORCINE) LOCK FLUSH IV SOLN 100 UNIT/ML 100 UNIT/ML
500 SOLUTION INTRAVENOUS PRN
Status: CANCELLED | OUTPATIENT
Start: 2023-03-16

## 2023-03-16 RX ORDER — SODIUM CHLORIDE 9 MG/ML
5-40 INJECTION INTRAVENOUS PRN
Status: DISCONTINUED | OUTPATIENT
Start: 2023-03-16 | End: 2023-03-17 | Stop reason: HOSPADM

## 2023-03-16 RX ORDER — ACETAMINOPHEN 325 MG/1
650 TABLET ORAL
Status: CANCELLED | OUTPATIENT
Start: 2023-03-16

## 2023-03-16 RX ORDER — FAMOTIDINE 10 MG/ML
20 INJECTION, SOLUTION INTRAVENOUS
Status: CANCELLED | OUTPATIENT
Start: 2023-03-16

## 2023-03-16 RX ORDER — MEPERIDINE HYDROCHLORIDE 50 MG/ML
12.5 INJECTION INTRAMUSCULAR; INTRAVENOUS; SUBCUTANEOUS PRN
Status: CANCELLED | OUTPATIENT
Start: 2023-03-16

## 2023-03-16 RX ORDER — HEPARIN SODIUM (PORCINE) LOCK FLUSH IV SOLN 100 UNIT/ML 100 UNIT/ML
500 SOLUTION INTRAVENOUS PRN
Status: DISCONTINUED | OUTPATIENT
Start: 2023-03-16 | End: 2023-03-17 | Stop reason: HOSPADM

## 2023-03-16 RX ORDER — 0.9 % SODIUM CHLORIDE 0.9 %
1000 INTRAVENOUS SOLUTION INTRAVENOUS ONCE
Status: COMPLETED | OUTPATIENT
Start: 2023-03-16 | End: 2023-03-16

## 2023-03-16 RX ORDER — 0.9 % SODIUM CHLORIDE 0.9 %
1000 INTRAVENOUS SOLUTION INTRAVENOUS ONCE
Status: CANCELLED
Start: 2023-03-16 | End: 2023-03-16

## 2023-03-16 RX ORDER — DIPHENHYDRAMINE HYDROCHLORIDE 50 MG/ML
50 INJECTION INTRAMUSCULAR; INTRAVENOUS
Status: CANCELLED | OUTPATIENT
Start: 2023-03-16

## 2023-03-16 RX ORDER — PALONOSETRON 0.05 MG/ML
0.25 INJECTION, SOLUTION INTRAVENOUS ONCE
Status: COMPLETED | OUTPATIENT
Start: 2023-03-16 | End: 2023-03-16

## 2023-03-16 RX ORDER — ALBUTEROL SULFATE 90 UG/1
4 AEROSOL, METERED RESPIRATORY (INHALATION) PRN
Status: CANCELLED | OUTPATIENT
Start: 2023-03-16

## 2023-03-16 RX ADMIN — SODIUM CHLORIDE 1000 ML: 9 INJECTION, SOLUTION INTRAVENOUS at 09:12

## 2023-03-16 RX ADMIN — DEXAMETHASONE SODIUM PHOSPHATE: 10 INJECTION, SOLUTION INTRAMUSCULAR; INTRAVENOUS at 09:12

## 2023-03-16 RX ADMIN — SODIUM CHLORIDE 118 MG: 9 INJECTION, SOLUTION INTRAVENOUS at 10:00

## 2023-03-16 RX ADMIN — CYCLOPHOSPHAMIDE 1180 MG: 1 INJECTION, POWDER, FOR SOLUTION INTRAVENOUS; ORAL at 10:21

## 2023-03-16 RX ADMIN — FOSAPREPITANT DIMEGLUMINE 150 MG: 150 INJECTION, POWDER, LYOPHILIZED, FOR SOLUTION INTRAVENOUS at 09:21

## 2023-03-16 RX ADMIN — SODIUM CHLORIDE, PRESERVATIVE FREE 10 ML: 5 INJECTION INTRAVENOUS at 11:12

## 2023-03-16 RX ADMIN — HEPARIN 500 UNITS: 100 SYRINGE at 11:12

## 2023-03-16 RX ADMIN — PALONOSETRON 0.25 MG: 0.05 INJECTION, SOLUTION INTRAVENOUS at 09:12

## 2023-03-16 NOTE — PROGRESS NOTES
Lab Results   Component Value Date    WBC 9.74 03/02/2023    HGB 12.4 03/02/2023    HCT 40.0 03/02/2023    MCV 77.8 (L) 03/02/2023     (L) 03/02/2023     Lab Results   Component Value Date    NEUTROABS 5.45 03/02/2023     Lab Results   Component Value Date     03/16/2023    K 3.6 03/16/2023     03/16/2023    CO2 30 (H) 03/16/2023    BUN 11 03/16/2023    CREATININE 0.6 03/16/2023    GLUCOSE 129 (H) 03/16/2023    CALCIUM 9.4 03/16/2023    PROT 5.7 (L) 03/16/2023    LABALBU 3.7 03/16/2023    BILITOT <0.2 03/16/2023    ALKPHOS 70 03/16/2023    AST 26 03/16/2023    ALT 23 03/16/2023    LABGLOM >60 03/16/2023    GLOB 2.1 03/16/2023

## 2023-03-17 ENCOUNTER — HOSPITAL ENCOUNTER (OUTPATIENT)
Dept: INFUSION THERAPY | Age: 68
Discharge: HOME | End: 2023-03-17
Payer: MEDICARE

## 2023-03-17 DIAGNOSIS — Z17.0 MALIGNANT NEOPLASM OF UPPER-INNER QUADRANT OF RIGHT BREAST IN FEMALE, ESTROGEN RECEPTOR POSITIVE (HCC): Primary | ICD-10-CM

## 2023-03-17 DIAGNOSIS — C50.211 MALIGNANT NEOPLASM OF UPPER-INNER QUADRANT OF RIGHT BREAST IN FEMALE, ESTROGEN RECEPTOR POSITIVE (HCC): Primary | ICD-10-CM

## 2023-03-17 DIAGNOSIS — Z17.0 MALIGNANT NEOPLASM OF LOWER-INNER QUADRANT OF RIGHT BREAST OF FEMALE, ESTROGEN RECEPTOR POSITIVE (HCC): ICD-10-CM

## 2023-03-17 DIAGNOSIS — C50.311 MALIGNANT NEOPLASM OF LOWER-INNER QUADRANT OF RIGHT BREAST OF FEMALE, ESTROGEN RECEPTOR POSITIVE (HCC): ICD-10-CM

## 2023-03-17 PROCEDURE — 6360000002 HC RX W HCPCS: Performed by: INTERNAL MEDICINE

## 2023-03-17 PROCEDURE — 96372 THER/PROPH/DIAG INJ SC/IM: CPT

## 2023-03-17 RX ADMIN — PEGFILGRASTIM 6 MG: 6 INJECTION SUBCUTANEOUS at 14:12

## 2023-03-30 ENCOUNTER — HOSPITAL ENCOUNTER (OUTPATIENT)
Dept: INFUSION THERAPY | Age: 68
Discharge: HOME | End: 2023-03-30
Payer: MEDICARE

## 2023-03-30 VITALS
OXYGEN SATURATION: 100 % | HEART RATE: 82 BPM | SYSTOLIC BLOOD PRESSURE: 148 MMHG | BODY MASS INDEX: 26.89 KG/M2 | TEMPERATURE: 98 F | WEIGHT: 177.4 LBS | RESPIRATION RATE: 18 BRPM | HEIGHT: 68 IN | DIASTOLIC BLOOD PRESSURE: 74 MMHG

## 2023-03-30 DIAGNOSIS — E87.6 HYPOKALEMIA: Primary | ICD-10-CM

## 2023-03-30 DIAGNOSIS — C50.211 MALIGNANT NEOPLASM OF UPPER-INNER QUADRANT OF RIGHT BREAST IN FEMALE, ESTROGEN RECEPTOR POSITIVE (HCC): Primary | ICD-10-CM

## 2023-03-30 DIAGNOSIS — Z17.0 MALIGNANT NEOPLASM OF LOWER-INNER QUADRANT OF RIGHT BREAST OF FEMALE, ESTROGEN RECEPTOR POSITIVE (HCC): ICD-10-CM

## 2023-03-30 DIAGNOSIS — Z17.0 MALIGNANT NEOPLASM OF LOWER-INNER QUADRANT OF RIGHT BREAST OF FEMALE, ESTROGEN RECEPTOR POSITIVE (HCC): Primary | ICD-10-CM

## 2023-03-30 DIAGNOSIS — Z17.0 MALIGNANT NEOPLASM OF UPPER-INNER QUADRANT OF RIGHT BREAST IN FEMALE, ESTROGEN RECEPTOR POSITIVE (HCC): ICD-10-CM

## 2023-03-30 DIAGNOSIS — Z17.0 MALIGNANT NEOPLASM OF UPPER-INNER QUADRANT OF RIGHT BREAST IN FEMALE, ESTROGEN RECEPTOR POSITIVE (HCC): Primary | ICD-10-CM

## 2023-03-30 DIAGNOSIS — C50.211 MALIGNANT NEOPLASM OF UPPER-INNER QUADRANT OF RIGHT BREAST IN FEMALE, ESTROGEN RECEPTOR POSITIVE (HCC): ICD-10-CM

## 2023-03-30 DIAGNOSIS — C50.311 MALIGNANT NEOPLASM OF LOWER-INNER QUADRANT OF RIGHT BREAST OF FEMALE, ESTROGEN RECEPTOR POSITIVE (HCC): Primary | ICD-10-CM

## 2023-03-30 DIAGNOSIS — C50.311 MALIGNANT NEOPLASM OF LOWER-INNER QUADRANT OF RIGHT BREAST OF FEMALE, ESTROGEN RECEPTOR POSITIVE (HCC): ICD-10-CM

## 2023-03-30 LAB
ALBUMIN SERPL-MCNC: 3.9 G/DL (ref 3.5–5.2)
ALP SERPL-CCNC: 75 U/L (ref 35–104)
ALT SERPL-CCNC: 25 U/L (ref 9–52)
ANION GAP SERPL CALCULATED.3IONS-SCNC: 5 MMOL/L (ref 7–19)
AST SERPL-CCNC: 29 U/L (ref 14–36)
BILIRUB SERPL-MCNC: 0.3 MG/DL (ref 0.2–1.3)
BUN SERPL-MCNC: 9 MG/DL (ref 7–17)
CALCIUM SERPL-MCNC: 9.1 MG/DL (ref 8.4–10.2)
CHLORIDE SERPL-SCNC: 106 MMOL/L (ref 98–111)
CO2 SERPL-SCNC: 30 MMOL/L (ref 22–29)
CREAT SERPL-MCNC: 0.6 MG/DL (ref 0.5–1)
ERYTHROCYTE [DISTWIDTH] IN BLOOD BY AUTOMATED COUNT: 17.5 % (ref 11.7–14.4)
GLOBULIN: 2.5 G/DL
GLUCOSE SERPL-MCNC: 132 MG/DL (ref 74–106)
HCT VFR BLD AUTO: 33.4 % (ref 34.1–44.9)
HGB BLD-MCNC: 10.5 G/DL (ref 11.2–15.7)
LYMPHOCYTES # BLD: 1.33 K/UL (ref 1.18–3.74)
LYMPHOCYTES NFR BLD: 9.9 % (ref 19.3–53.1)
MCH RBC QN AUTO: 24.3 PG (ref 25.6–32.2)
MCHC RBC AUTO-ENTMCNC: 31.4 G/DL (ref 32.3–35.5)
MCV RBC AUTO: 77.3 FL (ref 79.4–94.8)
MONOCYTES # BLD: 0.89 K/UL (ref 0.24–0.82)
MONOCYTES NFR BLD: 6.6 % (ref 4.7–12.5)
NEUTROPHILS # BLD: 8.23 K/UL (ref 1.56–6.13)
NEUTS SEG NFR BLD: 61 % (ref 34–71.1)
PLATELET # BLD AUTO: 202 K/UL (ref 182–369)
PMV BLD AUTO: 9.5 FL (ref 7.4–10.4)
POTASSIUM SERPL-SCNC: 3.3 MMOL/L (ref 3.5–5.1)
PROT SERPL-MCNC: 6.4 G/DL (ref 6.3–8.2)
RBC # BLD AUTO: 4.32 M/UL (ref 3.93–5.22)
SODIUM SERPL-SCNC: 141 MMOL/L (ref 137–145)
WBC # BLD AUTO: 13.48 K/UL (ref 3.98–10.04)

## 2023-03-30 PROCEDURE — 96375 TX/PRO/DX INJ NEW DRUG ADDON: CPT

## 2023-03-30 PROCEDURE — 36415 COLL VENOUS BLD VENIPUNCTURE: CPT

## 2023-03-30 PROCEDURE — 85025 COMPLETE CBC W/AUTO DIFF WBC: CPT

## 2023-03-30 PROCEDURE — 6360000002 HC RX W HCPCS: Performed by: INTERNAL MEDICINE

## 2023-03-30 PROCEDURE — 96360 HYDRATION IV INFUSION INIT: CPT

## 2023-03-30 PROCEDURE — 2580000003 HC RX 258: Performed by: INTERNAL MEDICINE

## 2023-03-30 PROCEDURE — 96413 CHEMO IV INFUSION 1 HR: CPT

## 2023-03-30 PROCEDURE — 96361 HYDRATE IV INFUSION ADD-ON: CPT

## 2023-03-30 PROCEDURE — 96367 TX/PROPH/DG ADDL SEQ IV INF: CPT

## 2023-03-30 PROCEDURE — 80053 COMPREHEN METABOLIC PANEL: CPT

## 2023-03-30 PROCEDURE — 96417 CHEMO IV INFUS EACH ADDL SEQ: CPT

## 2023-03-30 RX ORDER — EPINEPHRINE 1 MG/ML
0.3 INJECTION, SOLUTION, CONCENTRATE INTRAVENOUS PRN
Status: CANCELLED | OUTPATIENT
Start: 2023-03-30

## 2023-03-30 RX ORDER — PALONOSETRON 0.05 MG/ML
0.25 INJECTION, SOLUTION INTRAVENOUS ONCE
Status: COMPLETED | OUTPATIENT
Start: 2023-03-30 | End: 2023-03-30

## 2023-03-30 RX ORDER — FAMOTIDINE 10 MG/ML
20 INJECTION, SOLUTION INTRAVENOUS
Status: CANCELLED | OUTPATIENT
Start: 2023-03-30

## 2023-03-30 RX ORDER — 0.9 % SODIUM CHLORIDE 0.9 %
1000 INTRAVENOUS SOLUTION INTRAVENOUS ONCE
Status: COMPLETED | OUTPATIENT
Start: 2023-03-30 | End: 2023-03-30

## 2023-03-30 RX ORDER — SODIUM CHLORIDE 9 MG/ML
5-250 INJECTION, SOLUTION INTRAVENOUS PRN
Status: CANCELLED | OUTPATIENT
Start: 2023-03-30

## 2023-03-30 RX ORDER — MEPERIDINE HYDROCHLORIDE 50 MG/ML
12.5 INJECTION INTRAMUSCULAR; INTRAVENOUS; SUBCUTANEOUS PRN
Status: CANCELLED | OUTPATIENT
Start: 2023-03-30

## 2023-03-30 RX ORDER — ACETAMINOPHEN 325 MG/1
650 TABLET ORAL
Status: CANCELLED | OUTPATIENT
Start: 2023-03-30

## 2023-03-30 RX ORDER — SODIUM CHLORIDE 9 MG/ML
INJECTION, SOLUTION INTRAVENOUS CONTINUOUS
Status: CANCELLED | OUTPATIENT
Start: 2023-03-30

## 2023-03-30 RX ORDER — ALBUTEROL SULFATE 90 UG/1
4 AEROSOL, METERED RESPIRATORY (INHALATION) PRN
Status: CANCELLED | OUTPATIENT
Start: 2023-03-30

## 2023-03-30 RX ORDER — HEPARIN SODIUM (PORCINE) LOCK FLUSH IV SOLN 100 UNIT/ML 100 UNIT/ML
500 SOLUTION INTRAVENOUS PRN
Status: CANCELLED | OUTPATIENT
Start: 2023-03-30

## 2023-03-30 RX ORDER — 0.9 % SODIUM CHLORIDE 0.9 %
1000 INTRAVENOUS SOLUTION INTRAVENOUS ONCE
Status: CANCELLED
Start: 2023-03-30 | End: 2023-03-30

## 2023-03-30 RX ORDER — POTASSIUM CHLORIDE 29.8 MG/ML
20 INJECTION INTRAVENOUS ONCE
Status: COMPLETED | OUTPATIENT
Start: 2023-03-30 | End: 2023-03-30

## 2023-03-30 RX ORDER — HEPARIN SODIUM (PORCINE) LOCK FLUSH IV SOLN 100 UNIT/ML 100 UNIT/ML
500 SOLUTION INTRAVENOUS PRN
Status: DISCONTINUED | OUTPATIENT
Start: 2023-03-30 | End: 2023-03-31 | Stop reason: HOSPADM

## 2023-03-30 RX ORDER — POTASSIUM CHLORIDE 750 MG/1
20 CAPSULE, EXTENDED RELEASE ORAL 2 TIMES DAILY
Qty: 60 CAPSULE | Refills: 3 | Status: SHIPPED | OUTPATIENT
Start: 2023-03-30

## 2023-03-30 RX ORDER — PALONOSETRON 0.05 MG/ML
0.25 INJECTION, SOLUTION INTRAVENOUS ONCE
Status: CANCELLED | OUTPATIENT
Start: 2023-03-30 | End: 2023-03-30

## 2023-03-30 RX ORDER — SODIUM CHLORIDE 9 MG/ML
5-40 INJECTION INTRAVENOUS PRN
Status: CANCELLED | OUTPATIENT
Start: 2023-03-30

## 2023-03-30 RX ORDER — SODIUM CHLORIDE 9 MG/ML
5-40 INJECTION INTRAVENOUS PRN
Status: DISCONTINUED | OUTPATIENT
Start: 2023-03-30 | End: 2023-03-31 | Stop reason: HOSPADM

## 2023-03-30 RX ORDER — ONDANSETRON 2 MG/ML
8 INJECTION INTRAMUSCULAR; INTRAVENOUS
Status: CANCELLED | OUTPATIENT
Start: 2023-03-30

## 2023-03-30 RX ORDER — DIPHENHYDRAMINE HYDROCHLORIDE 50 MG/ML
50 INJECTION INTRAMUSCULAR; INTRAVENOUS
Status: CANCELLED | OUTPATIENT
Start: 2023-03-30

## 2023-03-30 RX ADMIN — DEXAMETHASONE SODIUM PHOSPHATE: 10 INJECTION, SOLUTION INTRAMUSCULAR; INTRAVENOUS at 09:16

## 2023-03-30 RX ADMIN — FOSAPREPITANT DIMEGLUMINE 150 MG: 150 INJECTION, POWDER, LYOPHILIZED, FOR SOLUTION INTRAVENOUS at 09:29

## 2023-03-30 RX ADMIN — Medication 500 UNITS: at 11:18

## 2023-03-30 RX ADMIN — POTASSIUM CHLORIDE 20 MEQ: 29.8 INJECTION, SOLUTION INTRAVENOUS at 10:07

## 2023-03-30 RX ADMIN — CYCLOPHOSPHAMIDE 1180 MG: 1 INJECTION, POWDER, FOR SOLUTION INTRAVENOUS; ORAL at 10:26

## 2023-03-30 RX ADMIN — SODIUM CHLORIDE 1000 ML: 9 INJECTION, SOLUTION INTRAVENOUS at 09:16

## 2023-03-30 RX ADMIN — SODIUM CHLORIDE 118 MG: 9 INJECTION, SOLUTION INTRAVENOUS at 10:09

## 2023-03-30 RX ADMIN — PALONOSETRON 0.25 MG: 0.05 INJECTION, SOLUTION INTRAVENOUS at 09:16

## 2023-03-30 RX ADMIN — SODIUM CHLORIDE, PRESERVATIVE FREE 10 ML: 5 INJECTION INTRAVENOUS at 11:18

## 2023-03-30 NOTE — PROGRESS NOTES
Lab Results   Component Value Date    WBC 23.93 (HH) 03/16/2023    HGB 11.0 (L) 03/16/2023    HCT 34.9 03/16/2023    MCV 77.2 (L) 03/16/2023     (L) 03/16/2023     Lab Results   Component Value Date    NEUTROABS 15.44 (H) 03/16/2023

## 2023-03-31 ENCOUNTER — HOSPITAL ENCOUNTER (OUTPATIENT)
Dept: INFUSION THERAPY | Age: 68
Discharge: HOME | End: 2023-03-31
Payer: MEDICARE

## 2023-03-31 DIAGNOSIS — C50.211 MALIGNANT NEOPLASM OF UPPER-INNER QUADRANT OF RIGHT BREAST IN FEMALE, ESTROGEN RECEPTOR POSITIVE (HCC): Primary | ICD-10-CM

## 2023-03-31 DIAGNOSIS — C50.311 MALIGNANT NEOPLASM OF LOWER-INNER QUADRANT OF RIGHT BREAST OF FEMALE, ESTROGEN RECEPTOR POSITIVE (HCC): ICD-10-CM

## 2023-03-31 DIAGNOSIS — Z17.0 MALIGNANT NEOPLASM OF LOWER-INNER QUADRANT OF RIGHT BREAST OF FEMALE, ESTROGEN RECEPTOR POSITIVE (HCC): ICD-10-CM

## 2023-03-31 DIAGNOSIS — Z17.0 MALIGNANT NEOPLASM OF UPPER-INNER QUADRANT OF RIGHT BREAST IN FEMALE, ESTROGEN RECEPTOR POSITIVE (HCC): Primary | ICD-10-CM

## 2023-03-31 PROCEDURE — 96372 THER/PROPH/DIAG INJ SC/IM: CPT

## 2023-03-31 PROCEDURE — 6360000002 HC RX W HCPCS: Performed by: INTERNAL MEDICINE

## 2023-03-31 RX ADMIN — PEGFILGRASTIM 6 MG: 6 INJECTION SUBCUTANEOUS at 15:17

## 2023-04-13 ENCOUNTER — HOSPITAL ENCOUNTER (OUTPATIENT)
Dept: INFUSION THERAPY | Age: 68
Discharge: HOME | End: 2023-04-13
Payer: MEDICARE

## 2023-04-13 DIAGNOSIS — C50.311 MALIGNANT NEOPLASM OF LOWER-INNER QUADRANT OF RIGHT BREAST OF FEMALE, ESTROGEN RECEPTOR POSITIVE (HCC): Primary | ICD-10-CM

## 2023-04-13 DIAGNOSIS — Z17.0 MALIGNANT NEOPLASM OF LOWER-INNER QUADRANT OF RIGHT BREAST OF FEMALE, ESTROGEN RECEPTOR POSITIVE (HCC): Primary | ICD-10-CM

## 2023-04-13 DIAGNOSIS — Z17.0 MALIGNANT NEOPLASM OF UPPER-INNER QUADRANT OF RIGHT BREAST IN FEMALE, ESTROGEN RECEPTOR POSITIVE (HCC): ICD-10-CM

## 2023-04-13 DIAGNOSIS — C50.211 MALIGNANT NEOPLASM OF UPPER-INNER QUADRANT OF RIGHT BREAST IN FEMALE, ESTROGEN RECEPTOR POSITIVE (HCC): ICD-10-CM

## 2023-04-13 LAB
ALBUMIN SERPL-MCNC: 4 G/DL (ref 3.5–5.2)
ALP SERPL-CCNC: 74 U/L (ref 35–104)
ALT SERPL-CCNC: 23 U/L (ref 9–52)
ANION GAP SERPL CALCULATED.3IONS-SCNC: 8 MMOL/L (ref 7–19)
AST SERPL-CCNC: 25 U/L (ref 14–36)
BILIRUB SERPL-MCNC: 0.4 MG/DL (ref 0.2–1.3)
BUN SERPL-MCNC: 8 MG/DL (ref 7–17)
CALCIUM SERPL-MCNC: 9.2 MG/DL (ref 8.4–10.2)
CHLORIDE SERPL-SCNC: 105 MMOL/L (ref 98–111)
CO2 SERPL-SCNC: 27 MMOL/L (ref 22–29)
CREAT SERPL-MCNC: 0.6 MG/DL (ref 0.5–1)
ERYTHROCYTE [DISTWIDTH] IN BLOOD BY AUTOMATED COUNT: 20.4 % (ref 11.7–14.4)
GLOBULIN: 2.6 G/DL
GLUCOSE SERPL-MCNC: 113 MG/DL (ref 74–106)
HCT VFR BLD AUTO: 32.3 % (ref 34.1–44.9)
HGB BLD-MCNC: 10.2 G/DL (ref 11.2–15.7)
LYMPHOCYTES # BLD: 1.17 K/UL (ref 1.18–3.74)
LYMPHOCYTES NFR BLD: 5.2 % (ref 19.3–53.1)
MCH RBC QN AUTO: 24.9 PG (ref 25.6–32.2)
MCHC RBC AUTO-ENTMCNC: 31.6 G/DL (ref 32.3–35.5)
MCV RBC AUTO: 79 FL (ref 79.4–94.8)
MONOCYTES # BLD: 1.2 K/UL (ref 0.24–0.82)
MONOCYTES NFR BLD: 5.4 % (ref 4.7–12.5)
NEUTROPHILS # BLD: 15.08 K/UL (ref 1.56–6.13)
NEUTS SEG NFR BLD: 67.6 % (ref 34–71.1)
PLATELET # BLD AUTO: 151 K/UL (ref 182–369)
PMV BLD AUTO: 9 FL (ref 7.4–10.4)
POTASSIUM SERPL-SCNC: 3.6 MMOL/L (ref 3.5–5.1)
PROT SERPL-MCNC: 6.6 G/DL (ref 6.3–8.2)
RBC # BLD AUTO: 4.09 M/UL (ref 3.93–5.22)
SODIUM SERPL-SCNC: 140 MMOL/L (ref 137–145)
WBC # BLD AUTO: 22.31 K/UL (ref 3.98–10.04)

## 2023-04-13 PROCEDURE — 2580000003 HC RX 258: Performed by: INTERNAL MEDICINE

## 2023-04-13 PROCEDURE — 2500000003 HC RX 250 WO HCPCS: Performed by: INTERNAL MEDICINE

## 2023-04-13 PROCEDURE — 96367 TX/PROPH/DG ADDL SEQ IV INF: CPT

## 2023-04-13 PROCEDURE — 36415 COLL VENOUS BLD VENIPUNCTURE: CPT

## 2023-04-13 PROCEDURE — 80053 COMPREHEN METABOLIC PANEL: CPT

## 2023-04-13 PROCEDURE — 96413 CHEMO IV INFUSION 1 HR: CPT

## 2023-04-13 PROCEDURE — 96375 TX/PRO/DX INJ NEW DRUG ADDON: CPT

## 2023-04-13 PROCEDURE — 85025 COMPLETE CBC W/AUTO DIFF WBC: CPT

## 2023-04-13 PROCEDURE — 6360000002 HC RX W HCPCS: Performed by: INTERNAL MEDICINE

## 2023-04-13 RX ORDER — SODIUM CHLORIDE 9 MG/ML
5-250 INJECTION, SOLUTION INTRAVENOUS PRN
Status: DISCONTINUED | OUTPATIENT
Start: 2023-04-13 | End: 2023-04-14 | Stop reason: HOSPADM

## 2023-04-13 RX ORDER — SODIUM CHLORIDE 0.9 % (FLUSH) 0.9 %
5-40 SYRINGE (ML) INJECTION PRN
Status: DISCONTINUED | OUTPATIENT
Start: 2023-04-13 | End: 2023-04-14 | Stop reason: HOSPADM

## 2023-04-13 RX ORDER — DIPHENHYDRAMINE HYDROCHLORIDE 50 MG/ML
50 INJECTION INTRAMUSCULAR; INTRAVENOUS ONCE
Status: COMPLETED | OUTPATIENT
Start: 2023-04-13 | End: 2023-04-13

## 2023-04-13 RX ORDER — PALONOSETRON 0.05 MG/ML
0.25 INJECTION, SOLUTION INTRAVENOUS ONCE
Status: COMPLETED | OUTPATIENT
Start: 2023-04-13 | End: 2023-04-13

## 2023-04-13 RX ORDER — FAMOTIDINE 10 MG/ML
20 INJECTION, SOLUTION INTRAVENOUS ONCE
Status: COMPLETED | OUTPATIENT
Start: 2023-04-13 | End: 2023-04-13

## 2023-04-13 RX ORDER — HEPARIN SODIUM (PORCINE) LOCK FLUSH IV SOLN 100 UNIT/ML 100 UNIT/ML
500 SOLUTION INTRAVENOUS PRN
Status: DISCONTINUED | OUTPATIENT
Start: 2023-04-13 | End: 2023-04-14 | Stop reason: HOSPADM

## 2023-04-13 RX ADMIN — DEXAMETHASONE SODIUM PHOSPHATE: 10 INJECTION, SOLUTION INTRAMUSCULAR; INTRAVENOUS at 09:44

## 2023-04-13 RX ADMIN — HEPARIN 500 UNITS: 100 SYRINGE at 11:05

## 2023-04-13 RX ADMIN — FAMOTIDINE 20 MG: 10 INJECTION INTRAVENOUS at 09:44

## 2023-04-13 RX ADMIN — PALONOSETRON 0.25 MG: 0.05 INJECTION, SOLUTION INTRAVENOUS at 09:44

## 2023-04-13 RX ADMIN — DIPHENHYDRAMINE HYDROCHLORIDE 50 MG: 50 INJECTION, SOLUTION INTRAMUSCULAR; INTRAVENOUS at 09:44

## 2023-04-13 RX ADMIN — SODIUM CHLORIDE 50 ML/HR: 9 INJECTION, SOLUTION INTRAVENOUS at 09:45

## 2023-04-13 RX ADMIN — SODIUM CHLORIDE, PRESERVATIVE FREE 10 ML: 5 INJECTION INTRAVENOUS at 11:05

## 2023-04-13 RX ADMIN — SODIUM CHLORIDE 156 MG: 9 INJECTION, SOLUTION INTRAVENOUS at 10:02

## 2023-04-17 RX ORDER — LISINOPRIL AND HYDROCHLOROTHIAZIDE 12.5; 1 MG/1; MG/1
TABLET ORAL
Qty: 90 TABLET | Refills: 0 | Status: SHIPPED | OUTPATIENT
Start: 2023-04-17

## 2023-04-20 ENCOUNTER — HOSPITAL ENCOUNTER (OUTPATIENT)
Dept: INFUSION THERAPY | Age: 68
Discharge: HOME | End: 2023-04-20
Payer: MEDICARE

## 2023-04-20 VITALS
SYSTOLIC BLOOD PRESSURE: 129 MMHG | HEART RATE: 81 BPM | RESPIRATION RATE: 18 BRPM | WEIGHT: 178 LBS | BODY MASS INDEX: 26.98 KG/M2 | DIASTOLIC BLOOD PRESSURE: 66 MMHG | TEMPERATURE: 98.7 F | OXYGEN SATURATION: 99 % | HEIGHT: 68 IN

## 2023-04-20 DIAGNOSIS — C50.211 MALIGNANT NEOPLASM OF UPPER-INNER QUADRANT OF RIGHT BREAST IN FEMALE, ESTROGEN RECEPTOR POSITIVE (HCC): ICD-10-CM

## 2023-04-20 DIAGNOSIS — Z17.0 MALIGNANT NEOPLASM OF UPPER-INNER QUADRANT OF RIGHT BREAST IN FEMALE, ESTROGEN RECEPTOR POSITIVE (HCC): ICD-10-CM

## 2023-04-20 DIAGNOSIS — Z17.0 MALIGNANT NEOPLASM OF LOWER-INNER QUADRANT OF RIGHT BREAST OF FEMALE, ESTROGEN RECEPTOR POSITIVE (HCC): Primary | ICD-10-CM

## 2023-04-20 DIAGNOSIS — Z17.0 MALIGNANT NEOPLASM OF UPPER-INNER QUADRANT OF RIGHT BREAST IN FEMALE, ESTROGEN RECEPTOR POSITIVE (HCC): Primary | ICD-10-CM

## 2023-04-20 DIAGNOSIS — C50.211 MALIGNANT NEOPLASM OF UPPER-INNER QUADRANT OF RIGHT BREAST IN FEMALE, ESTROGEN RECEPTOR POSITIVE (HCC): Primary | ICD-10-CM

## 2023-04-20 DIAGNOSIS — Z17.0 MALIGNANT NEOPLASM OF LOWER-INNER QUADRANT OF RIGHT BREAST OF FEMALE, ESTROGEN RECEPTOR POSITIVE (HCC): ICD-10-CM

## 2023-04-20 DIAGNOSIS — C50.311 MALIGNANT NEOPLASM OF LOWER-INNER QUADRANT OF RIGHT BREAST OF FEMALE, ESTROGEN RECEPTOR POSITIVE (HCC): ICD-10-CM

## 2023-04-20 DIAGNOSIS — C50.311 MALIGNANT NEOPLASM OF LOWER-INNER QUADRANT OF RIGHT BREAST OF FEMALE, ESTROGEN RECEPTOR POSITIVE (HCC): Primary | ICD-10-CM

## 2023-04-20 LAB
ALBUMIN SERPL-MCNC: 3.8 G/DL (ref 3.5–5.2)
ALP SERPL-CCNC: 48 U/L (ref 35–104)
ALT SERPL-CCNC: 29 U/L (ref 9–52)
ANION GAP SERPL CALCULATED.3IONS-SCNC: 9 MMOL/L (ref 7–19)
AST SERPL-CCNC: 27 U/L (ref 14–36)
BILIRUB SERPL-MCNC: 0.4 MG/DL (ref 0.2–1.3)
BUN SERPL-MCNC: 13 MG/DL (ref 7–17)
CALCIUM SERPL-MCNC: 9.2 MG/DL (ref 8.4–10.2)
CHLORIDE SERPL-SCNC: 102 MMOL/L (ref 98–111)
CO2 SERPL-SCNC: 28 MMOL/L (ref 22–29)
CREAT SERPL-MCNC: 0.6 MG/DL (ref 0.5–1)
ERYTHROCYTE [DISTWIDTH] IN BLOOD BY AUTOMATED COUNT: 19.9 % (ref 11.7–14.4)
GLOBULIN: 2.6 G/DL
GLUCOSE SERPL-MCNC: 117 MG/DL (ref 74–106)
HCT VFR BLD AUTO: 29 % (ref 34.1–44.9)
HGB BLD-MCNC: 9.1 G/DL (ref 11.2–15.7)
IRON SATN MFR SERPL: 14 % (ref 14–50)
IRON SERPL-MCNC: 37 UG/DL (ref 37–145)
LYMPHOCYTES # BLD: 0.65 K/UL (ref 1.18–3.74)
LYMPHOCYTES NFR BLD: 19 % (ref 19.3–53.1)
MCH RBC QN AUTO: 24.9 PG (ref 25.6–32.2)
MCHC RBC AUTO-ENTMCNC: 31.4 G/DL (ref 32.3–35.5)
MCV RBC AUTO: 79.2 FL (ref 79.4–94.8)
MONOCYTES # BLD: 0.48 K/UL (ref 0.24–0.82)
MONOCYTES NFR BLD: 14 % (ref 4.7–12.5)
NEUTROPHILS # BLD: 2.12 K/UL (ref 1.56–6.13)
NEUTS SEG NFR BLD: 61.9 % (ref 34–71.1)
PLATELET # BLD AUTO: 244 K/UL (ref 182–369)
PMV BLD AUTO: 9.2 FL (ref 7.4–10.4)
POTASSIUM SERPL-SCNC: 4 MMOL/L (ref 3.5–5.1)
PROT SERPL-MCNC: 6.3 G/DL (ref 6.3–8.2)
RBC # BLD AUTO: 3.66 M/UL (ref 3.93–5.22)
SODIUM SERPL-SCNC: 139 MMOL/L (ref 137–145)
TIBC SERPL-MCNC: 256 UG/DL (ref 250–400)
VIT B12 SERPL-MCNC: >2000 PG/ML (ref 211–946)
WBC # BLD AUTO: 3.42 K/UL (ref 3.98–10.04)

## 2023-04-20 PROCEDURE — 2580000003 HC RX 258: Performed by: INTERNAL MEDICINE

## 2023-04-20 PROCEDURE — 82607 VITAMIN B-12: CPT

## 2023-04-20 PROCEDURE — 2500000003 HC RX 250 WO HCPCS: Performed by: INTERNAL MEDICINE

## 2023-04-20 PROCEDURE — 6360000002 HC RX W HCPCS: Performed by: INTERNAL MEDICINE

## 2023-04-20 PROCEDURE — 96375 TX/PRO/DX INJ NEW DRUG ADDON: CPT

## 2023-04-20 PROCEDURE — 36415 COLL VENOUS BLD VENIPUNCTURE: CPT

## 2023-04-20 PROCEDURE — 96367 TX/PROPH/DG ADDL SEQ IV INF: CPT

## 2023-04-20 PROCEDURE — 83540 ASSAY OF IRON: CPT

## 2023-04-20 PROCEDURE — 85025 COMPLETE CBC W/AUTO DIFF WBC: CPT

## 2023-04-20 PROCEDURE — 96413 CHEMO IV INFUSION 1 HR: CPT

## 2023-04-20 PROCEDURE — 83550 IRON BINDING TEST: CPT

## 2023-04-20 PROCEDURE — 80053 COMPREHEN METABOLIC PANEL: CPT

## 2023-04-20 RX ORDER — HEPARIN SODIUM (PORCINE) LOCK FLUSH IV SOLN 100 UNIT/ML 100 UNIT/ML
500 SOLUTION INTRAVENOUS PRN
Status: DISCONTINUED | OUTPATIENT
Start: 2023-04-20 | End: 2023-04-21 | Stop reason: HOSPADM

## 2023-04-20 RX ORDER — SODIUM CHLORIDE 9 MG/ML
5-250 INJECTION, SOLUTION INTRAVENOUS PRN
Status: DISCONTINUED | OUTPATIENT
Start: 2023-04-20 | End: 2023-04-21 | Stop reason: HOSPADM

## 2023-04-20 RX ORDER — PALONOSETRON 0.05 MG/ML
0.25 INJECTION, SOLUTION INTRAVENOUS ONCE
Status: CANCELLED
Start: 2023-04-20 | End: 2023-04-20

## 2023-04-20 RX ORDER — FAMOTIDINE 10 MG/ML
20 INJECTION, SOLUTION INTRAVENOUS ONCE
Status: COMPLETED | OUTPATIENT
Start: 2023-04-20 | End: 2023-04-20

## 2023-04-20 RX ORDER — DIPHENHYDRAMINE HYDROCHLORIDE 50 MG/ML
50 INJECTION INTRAMUSCULAR; INTRAVENOUS ONCE
Status: COMPLETED | OUTPATIENT
Start: 2023-04-20 | End: 2023-04-20

## 2023-04-20 RX ORDER — PALONOSETRON 0.05 MG/ML
0.25 INJECTION, SOLUTION INTRAVENOUS ONCE
Status: COMPLETED | OUTPATIENT
Start: 2023-04-20 | End: 2023-04-20

## 2023-04-20 RX ORDER — SODIUM CHLORIDE 0.9 % (FLUSH) 0.9 %
5-40 SYRINGE (ML) INJECTION PRN
Status: DISCONTINUED | OUTPATIENT
Start: 2023-04-20 | End: 2023-04-21 | Stop reason: HOSPADM

## 2023-04-20 RX ADMIN — FAMOTIDINE 20 MG: 10 INJECTION, SOLUTION INTRAVENOUS at 09:31

## 2023-04-20 RX ADMIN — SODIUM CHLORIDE, PRESERVATIVE FREE 10 ML: 5 INJECTION INTRAVENOUS at 10:56

## 2023-04-20 RX ADMIN — DIPHENHYDRAMINE HYDROCHLORIDE 25 MG: 50 INJECTION, SOLUTION INTRAMUSCULAR; INTRAVENOUS at 09:31

## 2023-04-20 RX ADMIN — HEPARIN 500 UNITS: 100 SYRINGE at 10:56

## 2023-04-20 RX ADMIN — PALONOSETRON 0.25 MG: 0.05 INJECTION, SOLUTION INTRAVENOUS at 09:30

## 2023-04-20 RX ADMIN — DEXAMETHASONE SODIUM PHOSPHATE: 10 INJECTION, SOLUTION INTRAMUSCULAR; INTRAVENOUS at 09:30

## 2023-04-20 RX ADMIN — SODIUM CHLORIDE 156 MG: 9 INJECTION, SOLUTION INTRAVENOUS at 09:54

## 2023-04-20 NOTE — PROGRESS NOTES
Lab Results   Component Value Date    WBC 3.42 (L) 04/20/2023    HGB 9.1 (L) 04/20/2023    HCT 29.0 (L) 04/20/2023    MCV 79.2 (L) 04/20/2023     04/20/2023     Lab Results   Component Value Date    NEUTROABS 2.12 04/20/2023     Lab Results   Component Value Date     04/20/2023    K 4.0 04/20/2023     04/20/2023    CO2 28 04/20/2023    BUN 13 04/20/2023    CREATININE 0.6 04/20/2023    GLUCOSE 117 (H) 04/20/2023    CALCIUM 9.2 04/20/2023    PROT 6.3 04/20/2023    LABALBU 3.8 04/20/2023    BILITOT 0.4 04/20/2023    ALKPHOS 48 04/20/2023    AST 27 04/20/2023    ALT 29 04/20/2023    LABGLOM >60 04/20/2023    GLOB 2.6 04/20/2023

## 2023-04-27 ENCOUNTER — HOSPITAL ENCOUNTER (OUTPATIENT)
Dept: INFUSION THERAPY | Age: 68
Discharge: HOME | End: 2023-04-27
Payer: MEDICARE

## 2023-04-27 VITALS
SYSTOLIC BLOOD PRESSURE: 131 MMHG | OXYGEN SATURATION: 98 % | HEIGHT: 68 IN | TEMPERATURE: 98.8 F | BODY MASS INDEX: 27.08 KG/M2 | DIASTOLIC BLOOD PRESSURE: 59 MMHG | HEART RATE: 90 BPM | RESPIRATION RATE: 18 BRPM | WEIGHT: 178.7 LBS

## 2023-04-27 DIAGNOSIS — C50.311 MALIGNANT NEOPLASM OF LOWER-INNER QUADRANT OF RIGHT BREAST OF FEMALE, ESTROGEN RECEPTOR POSITIVE (HCC): Primary | ICD-10-CM

## 2023-04-27 DIAGNOSIS — Z17.0 MALIGNANT NEOPLASM OF LOWER-INNER QUADRANT OF RIGHT BREAST OF FEMALE, ESTROGEN RECEPTOR POSITIVE (HCC): Primary | ICD-10-CM

## 2023-04-27 DIAGNOSIS — C50.211 MALIGNANT NEOPLASM OF UPPER-INNER QUADRANT OF RIGHT BREAST IN FEMALE, ESTROGEN RECEPTOR POSITIVE (HCC): ICD-10-CM

## 2023-04-27 DIAGNOSIS — Z17.0 MALIGNANT NEOPLASM OF UPPER-INNER QUADRANT OF RIGHT BREAST IN FEMALE, ESTROGEN RECEPTOR POSITIVE (HCC): ICD-10-CM

## 2023-04-27 LAB
ALBUMIN SERPL-MCNC: 3.7 G/DL (ref 3.5–5.2)
ALP SERPL-CCNC: 56 U/L (ref 35–104)
ALT SERPL-CCNC: 31 U/L (ref 9–52)
ANION GAP SERPL CALCULATED.3IONS-SCNC: 6 MMOL/L (ref 7–19)
AST SERPL-CCNC: 26 U/L (ref 14–36)
BILIRUB SERPL-MCNC: 0.5 MG/DL (ref 0.2–1.3)
BUN SERPL-MCNC: 12 MG/DL (ref 7–17)
CALCIUM SERPL-MCNC: 8.9 MG/DL (ref 8.4–10.2)
CHLORIDE SERPL-SCNC: 102 MMOL/L (ref 98–111)
CO2 SERPL-SCNC: 29 MMOL/L (ref 22–29)
CREAT SERPL-MCNC: 0.6 MG/DL (ref 0.5–1)
ERYTHROCYTE [DISTWIDTH] IN BLOOD BY AUTOMATED COUNT: 21.2 % (ref 11.7–14.4)
GLOBULIN: 2.7 G/DL
GLUCOSE SERPL-MCNC: 103 MG/DL (ref 74–106)
HCT VFR BLD AUTO: 28.4 % (ref 34.1–44.9)
HGB BLD-MCNC: 8.9 G/DL (ref 11.2–15.7)
LYMPHOCYTES # BLD: 0.63 K/UL (ref 1.18–3.74)
LYMPHOCYTES NFR BLD: 20.9 % (ref 19.3–53.1)
MCH RBC QN AUTO: 25.5 PG (ref 25.6–32.2)
MCHC RBC AUTO-ENTMCNC: 31.3 G/DL (ref 32.3–35.5)
MCV RBC AUTO: 81.4 FL (ref 79.4–94.8)
MONOCYTES # BLD: 0.41 K/UL (ref 0.24–0.82)
MONOCYTES NFR BLD: 13.6 % (ref 4.7–12.5)
NEUTROPHILS # BLD: 1.77 K/UL (ref 1.56–6.13)
NEUTS SEG NFR BLD: 58.8 % (ref 34–71.1)
PLATELET # BLD AUTO: 241 K/UL (ref 182–369)
PMV BLD AUTO: 8.9 FL (ref 7.4–10.4)
POTASSIUM SERPL-SCNC: 3.5 MMOL/L (ref 3.5–5.1)
PROT SERPL-MCNC: 6.4 G/DL (ref 6.3–8.2)
RBC # BLD AUTO: 3.49 M/UL (ref 3.93–5.22)
SODIUM SERPL-SCNC: 137 MMOL/L (ref 137–145)
WBC # BLD AUTO: 3.01 K/UL (ref 3.98–10.04)

## 2023-04-27 PROCEDURE — 2500000003 HC RX 250 WO HCPCS: Performed by: INTERNAL MEDICINE

## 2023-04-27 PROCEDURE — 85025 COMPLETE CBC W/AUTO DIFF WBC: CPT

## 2023-04-27 PROCEDURE — 36415 COLL VENOUS BLD VENIPUNCTURE: CPT

## 2023-04-27 PROCEDURE — 80053 COMPREHEN METABOLIC PANEL: CPT

## 2023-04-27 PROCEDURE — 96413 CHEMO IV INFUSION 1 HR: CPT

## 2023-04-27 PROCEDURE — 96375 TX/PRO/DX INJ NEW DRUG ADDON: CPT

## 2023-04-27 PROCEDURE — 6360000002 HC RX W HCPCS: Performed by: INTERNAL MEDICINE

## 2023-04-27 PROCEDURE — 2580000003 HC RX 258: Performed by: INTERNAL MEDICINE

## 2023-04-27 RX ORDER — SODIUM CHLORIDE 0.9 % (FLUSH) 0.9 %
5-40 SYRINGE (ML) INJECTION PRN
Status: DISCONTINUED | OUTPATIENT
Start: 2023-04-27 | End: 2023-04-28 | Stop reason: HOSPADM

## 2023-04-27 RX ORDER — PALONOSETRON 0.05 MG/ML
0.25 INJECTION, SOLUTION INTRAVENOUS ONCE
Status: COMPLETED | OUTPATIENT
Start: 2023-04-27 | End: 2023-04-27

## 2023-04-27 RX ORDER — SODIUM CHLORIDE 9 MG/ML
5-250 INJECTION, SOLUTION INTRAVENOUS PRN
Status: DISCONTINUED | OUTPATIENT
Start: 2023-04-27 | End: 2023-04-28 | Stop reason: HOSPADM

## 2023-04-27 RX ORDER — HEPARIN SODIUM (PORCINE) LOCK FLUSH IV SOLN 100 UNIT/ML 100 UNIT/ML
500 SOLUTION INTRAVENOUS PRN
Status: DISCONTINUED | OUTPATIENT
Start: 2023-04-27 | End: 2023-04-28 | Stop reason: HOSPADM

## 2023-04-27 RX ORDER — DIPHENHYDRAMINE HYDROCHLORIDE 50 MG/ML
50 INJECTION INTRAMUSCULAR; INTRAVENOUS ONCE
Status: COMPLETED | OUTPATIENT
Start: 2023-04-27 | End: 2023-04-27

## 2023-04-27 RX ORDER — FAMOTIDINE 10 MG/ML
20 INJECTION, SOLUTION INTRAVENOUS ONCE
Status: COMPLETED | OUTPATIENT
Start: 2023-04-27 | End: 2023-04-27

## 2023-04-27 RX ADMIN — SODIUM CHLORIDE 50 ML/HR: 9 INJECTION, SOLUTION INTRAVENOUS at 12:43

## 2023-04-27 RX ADMIN — DIPHENHYDRAMINE HYDROCHLORIDE 50 MG: 50 INJECTION, SOLUTION INTRAMUSCULAR; INTRAVENOUS at 12:44

## 2023-04-27 RX ADMIN — DEXAMETHASONE SODIUM PHOSPHATE: 10 INJECTION, SOLUTION INTRAMUSCULAR; INTRAVENOUS at 12:44

## 2023-04-27 RX ADMIN — PALONOSETRON 0.25 MG: 0.05 INJECTION, SOLUTION INTRAVENOUS at 12:44

## 2023-04-27 RX ADMIN — SODIUM CHLORIDE, PRESERVATIVE FREE 10 ML: 5 INJECTION INTRAVENOUS at 13:59

## 2023-04-27 RX ADMIN — SODIUM CHLORIDE 156 MG: 9 INJECTION, SOLUTION INTRAVENOUS at 12:58

## 2023-04-27 RX ADMIN — FAMOTIDINE 20 MG: 10 INJECTION, SOLUTION INTRAVENOUS at 12:44

## 2023-04-27 RX ADMIN — HEPARIN 500 UNITS: 100 SYRINGE at 13:59

## 2023-05-02 NOTE — PROGRESS NOTES
MEDICAL ONCOLOGY PROGRESS NOTE     Pt Name: Vinay Saeed  MRN: [id number]  YOB: 1955  Date of evaluation: 5/4/2023      HISTORY OF PRESENT ILLNESS:    Reason for MD visit-toxicity assessment/disease management  The patient is currently receiving neoadjuvant chemotherapy for diagnosis of IDC of the right breast, grade 3. She is status post completion neoadjuvant chemotherapy with AC with G-CSF. She is here today for weekly Taxol, cycle number 4 out of 12. She has been tolerated treatments complains of fatigue. She has been tolerated previous complaints of fatigue and mild nausea. Diagnosis  Invasive ductal carcinoma, 1:00 right breast, Dec 2022  Grade 3  ER 99%, [place] 0, HER-2 2+/equivocal, FISH-negative, Ki67 55%  MammaPrint: Luminal B/high risk. Invasive ductal carcinoma, 4:00 right breast, Dec 2022  ER 96%, %, HER-2 2+/equivocal, FISH-negative, Ki67 15%  MammaPrint: Luminal B/high risk. Grade 2  [facility] 81 gene genetic panel: Negative for pathogenic mutations; VUS BRIP1    Treatment Summary  2/15/23 Initiated dose dense Adriamycin Cyclophosphamide + GCSF every 2 weeks x4 cycles followed by weekly Taxol x12 weeks  Anticipate surgery  Anticipate radiation therapy  Anticipate endocrine hormone therapy    Cancer History:  Fide Mckeon was first seen by me 1/25/2023. She felt a palpable mass in the right breast.  Family history significant for history of breast cancer paternal grandmother. No prior use of hormone replacement therapy. 12/8/22 US right breast: Real-time ultrasound performed by technologist, with ultrasound images presented for radiologist interpretation. Additionally I scanned the patient myself. Palpable area of concern is located at 1:00 5 cm from the nipple. Ultrasound at this location demonstrates a heterogeneously hypoechoic round noncircumscribed mass with microlobulated and angular margins measuring 20 x 20 x 23 mm.  There is an additional similar appearing mass

## 2023-05-04 ENCOUNTER — HOSPITAL ENCOUNTER (OUTPATIENT)
Dept: INFUSION THERAPY | Age: 68
Discharge: HOME | End: 2023-05-04
Payer: MEDICARE

## 2023-05-04 ENCOUNTER — OFFICE VISIT (OUTPATIENT)
Dept: HEMATOLOGY | Age: 68
End: 2023-05-04
Payer: MEDICARE

## 2023-05-04 VITALS
DIASTOLIC BLOOD PRESSURE: 72 MMHG | BODY MASS INDEX: 27.08 KG/M2 | OXYGEN SATURATION: 100 % | RESPIRATION RATE: 16 BRPM | SYSTOLIC BLOOD PRESSURE: 122 MMHG | TEMPERATURE: 98.1 F | HEIGHT: 68 IN | WEIGHT: 178.7 LBS | HEART RATE: 78 BPM

## 2023-05-04 DIAGNOSIS — Z17.0 MALIGNANT NEOPLASM OF LOWER-INNER QUADRANT OF RIGHT BREAST OF FEMALE, ESTROGEN RECEPTOR POSITIVE (HCC): ICD-10-CM

## 2023-05-04 DIAGNOSIS — T45.1X5A ANTINEOPLASTIC CHEMOTHERAPY INDUCED ANEMIA: ICD-10-CM

## 2023-05-04 DIAGNOSIS — C50.311 MALIGNANT NEOPLASM OF LOWER-INNER QUADRANT OF RIGHT BREAST OF FEMALE, ESTROGEN RECEPTOR POSITIVE (HCC): ICD-10-CM

## 2023-05-04 DIAGNOSIS — T45.1X5A CHEMOTHERAPY-INDUCED FATIGUE: ICD-10-CM

## 2023-05-04 DIAGNOSIS — Z71.89 CARE PLAN DISCUSSED WITH PATIENT: ICD-10-CM

## 2023-05-04 DIAGNOSIS — D64.81 ANTINEOPLASTIC CHEMOTHERAPY INDUCED ANEMIA: ICD-10-CM

## 2023-05-04 DIAGNOSIS — C50.311 MALIGNANT NEOPLASM OF LOWER-INNER QUADRANT OF RIGHT BREAST OF FEMALE, ESTROGEN RECEPTOR POSITIVE (HCC): Primary | ICD-10-CM

## 2023-05-04 DIAGNOSIS — Z17.0 MALIGNANT NEOPLASM OF UPPER-INNER QUADRANT OF RIGHT BREAST IN FEMALE, ESTROGEN RECEPTOR POSITIVE (HCC): ICD-10-CM

## 2023-05-04 DIAGNOSIS — Z17.0 MALIGNANT NEOPLASM OF UPPER-INNER QUADRANT OF RIGHT BREAST IN FEMALE, ESTROGEN RECEPTOR POSITIVE (HCC): Primary | ICD-10-CM

## 2023-05-04 DIAGNOSIS — R53.83 CHEMOTHERAPY-INDUCED FATIGUE: ICD-10-CM

## 2023-05-04 DIAGNOSIS — C50.211 MALIGNANT NEOPLASM OF UPPER-INNER QUADRANT OF RIGHT BREAST IN FEMALE, ESTROGEN RECEPTOR POSITIVE (HCC): ICD-10-CM

## 2023-05-04 DIAGNOSIS — Z51.11 CHEMOTHERAPY MANAGEMENT, ENCOUNTER FOR: ICD-10-CM

## 2023-05-04 DIAGNOSIS — Z17.0 MALIGNANT NEOPLASM OF LOWER-INNER QUADRANT OF RIGHT BREAST OF FEMALE, ESTROGEN RECEPTOR POSITIVE (HCC): Primary | ICD-10-CM

## 2023-05-04 DIAGNOSIS — C50.211 MALIGNANT NEOPLASM OF UPPER-INNER QUADRANT OF RIGHT BREAST IN FEMALE, ESTROGEN RECEPTOR POSITIVE (HCC): Primary | ICD-10-CM

## 2023-05-04 DIAGNOSIS — T45.1X5D ADVERSE EFFECT OF CHEMOTHERAPY, SUBSEQUENT ENCOUNTER: ICD-10-CM

## 2023-05-04 DIAGNOSIS — R04.0 BLEEDING FROM THE NOSE: ICD-10-CM

## 2023-05-04 LAB
ALBUMIN SERPL-MCNC: 3.7 G/DL (ref 3.5–5.2)
ALP SERPL-CCNC: 53 U/L (ref 35–104)
ALT SERPL-CCNC: 32 U/L (ref 9–52)
ANION GAP SERPL CALCULATED.3IONS-SCNC: 9 MMOL/L (ref 7–19)
AST SERPL-CCNC: 33 U/L (ref 14–36)
BILIRUB SERPL-MCNC: 0.7 MG/DL (ref 0.2–1.3)
BUN SERPL-MCNC: 13 MG/DL (ref 7–17)
CALCIUM SERPL-MCNC: 9.1 MG/DL (ref 8.4–10.2)
CHLORIDE SERPL-SCNC: 102 MMOL/L (ref 98–111)
CO2 SERPL-SCNC: 27 MMOL/L (ref 22–29)
CREAT SERPL-MCNC: 0.7 MG/DL (ref 0.5–1)
ERYTHROCYTE [DISTWIDTH] IN BLOOD BY AUTOMATED COUNT: 21.7 % (ref 11.7–14.4)
FERRITIN SERPL-MCNC: 174.7 NG/ML (ref 13–150)
GLOBULIN: 2.8 G/DL
GLUCOSE SERPL-MCNC: 127 MG/DL (ref 74–106)
HCT VFR BLD AUTO: 29.7 % (ref 34.1–44.9)
HGB BLD-MCNC: 9.3 G/DL (ref 11.2–15.7)
LYMPHOCYTES # BLD: 0.64 K/UL (ref 1.18–3.74)
LYMPHOCYTES NFR BLD: 21.4 % (ref 19.3–53.1)
MCH RBC QN AUTO: 26.1 PG (ref 25.6–32.2)
MCHC RBC AUTO-ENTMCNC: 31.3 G/DL (ref 32.3–35.5)
MCV RBC AUTO: 83.4 FL (ref 79.4–94.8)
MONOCYTES # BLD: 0.33 K/UL (ref 0.24–0.82)
MONOCYTES NFR BLD: 11 % (ref 4.7–12.5)
NEUTROPHILS # BLD: 1.85 K/UL (ref 1.56–6.13)
NEUTS SEG NFR BLD: 61.9 % (ref 34–71.1)
PLATELET # BLD AUTO: 227 K/UL (ref 182–369)
PMV BLD AUTO: 8.8 FL (ref 7.4–10.4)
POTASSIUM SERPL-SCNC: 3.7 MMOL/L (ref 3.5–5.1)
PROT SERPL-MCNC: 6.5 G/DL (ref 6.3–8.2)
RBC # BLD AUTO: 3.56 M/UL (ref 3.93–5.22)
SODIUM SERPL-SCNC: 138 MMOL/L (ref 137–145)
WBC # BLD AUTO: 2.99 K/UL (ref 3.98–10.04)

## 2023-05-04 PROCEDURE — 96413 CHEMO IV INFUSION 1 HR: CPT

## 2023-05-04 PROCEDURE — 2580000003 HC RX 258: Performed by: INTERNAL MEDICINE

## 2023-05-04 PROCEDURE — 85025 COMPLETE CBC W/AUTO DIFF WBC: CPT

## 2023-05-04 PROCEDURE — 2500000003 HC RX 250 WO HCPCS: Performed by: INTERNAL MEDICINE

## 2023-05-04 PROCEDURE — 99214 OFFICE O/P EST MOD 30 MIN: CPT | Performed by: INTERNAL MEDICINE

## 2023-05-04 PROCEDURE — 1123F ACP DISCUSS/DSCN MKR DOCD: CPT | Performed by: INTERNAL MEDICINE

## 2023-05-04 PROCEDURE — 6360000002 HC RX W HCPCS: Performed by: INTERNAL MEDICINE

## 2023-05-04 PROCEDURE — 36415 COLL VENOUS BLD VENIPUNCTURE: CPT

## 2023-05-04 PROCEDURE — 96375 TX/PRO/DX INJ NEW DRUG ADDON: CPT

## 2023-05-04 PROCEDURE — 80053 COMPREHEN METABOLIC PANEL: CPT

## 2023-05-04 RX ORDER — HEPARIN SODIUM (PORCINE) LOCK FLUSH IV SOLN 100 UNIT/ML 100 UNIT/ML
500 SOLUTION INTRAVENOUS PRN
OUTPATIENT
Start: 2023-05-18

## 2023-05-04 RX ORDER — ONDANSETRON 2 MG/ML
8 INJECTION INTRAMUSCULAR; INTRAVENOUS
OUTPATIENT
Start: 2023-05-11

## 2023-05-04 RX ORDER — DIPHENHYDRAMINE HYDROCHLORIDE 50 MG/ML
50 INJECTION INTRAMUSCULAR; INTRAVENOUS
OUTPATIENT
Start: 2023-05-11

## 2023-05-04 RX ORDER — ONDANSETRON 2 MG/ML
8 INJECTION INTRAMUSCULAR; INTRAVENOUS
Status: CANCELLED | OUTPATIENT
Start: 2023-05-04

## 2023-05-04 RX ORDER — SODIUM CHLORIDE 9 MG/ML
5-250 INJECTION, SOLUTION INTRAVENOUS PRN
Status: CANCELLED | OUTPATIENT
Start: 2023-05-04

## 2023-05-04 RX ORDER — DIPHENHYDRAMINE HYDROCHLORIDE 50 MG/ML
50 INJECTION INTRAMUSCULAR; INTRAVENOUS
Status: CANCELLED | OUTPATIENT
Start: 2023-05-04

## 2023-05-04 RX ORDER — SODIUM CHLORIDE 9 MG/ML
5-250 INJECTION, SOLUTION INTRAVENOUS PRN
OUTPATIENT
Start: 2023-05-18

## 2023-05-04 RX ORDER — FAMOTIDINE 10 MG/ML
20 INJECTION, SOLUTION INTRAVENOUS ONCE
Status: COMPLETED | OUTPATIENT
Start: 2023-05-04 | End: 2023-05-04

## 2023-05-04 RX ORDER — SODIUM CHLORIDE 0.9 % (FLUSH) 0.9 %
5-40 SYRINGE (ML) INJECTION PRN
Status: CANCELLED | OUTPATIENT
Start: 2023-05-04

## 2023-05-04 RX ORDER — PALONOSETRON 0.05 MG/ML
0.25 INJECTION, SOLUTION INTRAVENOUS ONCE
Status: CANCELLED
Start: 2023-05-04 | End: 2023-05-04

## 2023-05-04 RX ORDER — ACETAMINOPHEN 325 MG/1
650 TABLET ORAL
Status: CANCELLED | OUTPATIENT
Start: 2023-05-04

## 2023-05-04 RX ORDER — HEPARIN SODIUM (PORCINE) LOCK FLUSH IV SOLN 100 UNIT/ML 100 UNIT/ML
500 SOLUTION INTRAVENOUS PRN
Status: DISCONTINUED | OUTPATIENT
Start: 2023-05-04 | End: 2023-05-05 | Stop reason: HOSPADM

## 2023-05-04 RX ORDER — MEPERIDINE HYDROCHLORIDE 50 MG/ML
12.5 INJECTION INTRAMUSCULAR; INTRAVENOUS; SUBCUTANEOUS PRN
Status: CANCELLED | OUTPATIENT
Start: 2023-05-04

## 2023-05-04 RX ORDER — SODIUM CHLORIDE 9 MG/ML
INJECTION, SOLUTION INTRAVENOUS CONTINUOUS
Status: CANCELLED | OUTPATIENT
Start: 2023-05-04

## 2023-05-04 RX ORDER — ALBUTEROL SULFATE 90 UG/1
4 AEROSOL, METERED RESPIRATORY (INHALATION) PRN
Status: CANCELLED | OUTPATIENT
Start: 2023-05-04

## 2023-05-04 RX ORDER — FAMOTIDINE 10 MG/ML
20 INJECTION, SOLUTION INTRAVENOUS ONCE
Status: CANCELLED | OUTPATIENT
Start: 2023-05-04 | End: 2023-05-04

## 2023-05-04 RX ORDER — ALBUTEROL SULFATE 90 UG/1
4 AEROSOL, METERED RESPIRATORY (INHALATION) PRN
OUTPATIENT
Start: 2023-05-18

## 2023-05-04 RX ORDER — SODIUM CHLORIDE 9 MG/ML
INJECTION, SOLUTION INTRAVENOUS CONTINUOUS
OUTPATIENT
Start: 2023-05-18

## 2023-05-04 RX ORDER — PALONOSETRON 0.05 MG/ML
0.25 INJECTION, SOLUTION INTRAVENOUS ONCE
Start: 2023-05-18 | End: 2023-05-18

## 2023-05-04 RX ORDER — HEPARIN SODIUM (PORCINE) LOCK FLUSH IV SOLN 100 UNIT/ML 100 UNIT/ML
500 SOLUTION INTRAVENOUS PRN
Status: CANCELLED | OUTPATIENT
Start: 2023-05-04

## 2023-05-04 RX ORDER — HEPARIN SODIUM (PORCINE) LOCK FLUSH IV SOLN 100 UNIT/ML 100 UNIT/ML
500 SOLUTION INTRAVENOUS PRN
OUTPATIENT
Start: 2023-05-11

## 2023-05-04 RX ORDER — ACETAMINOPHEN 325 MG/1
650 TABLET ORAL
OUTPATIENT
Start: 2023-05-18

## 2023-05-04 RX ORDER — PALONOSETRON 0.05 MG/ML
0.25 INJECTION, SOLUTION INTRAVENOUS ONCE
Start: 2023-05-11 | End: 2023-05-11

## 2023-05-04 RX ORDER — DIPHENHYDRAMINE HYDROCHLORIDE 50 MG/ML
50 INJECTION INTRAMUSCULAR; INTRAVENOUS
OUTPATIENT
Start: 2023-05-18

## 2023-05-04 RX ORDER — EPINEPHRINE 1 MG/ML
0.3 INJECTION, SOLUTION, CONCENTRATE INTRAVENOUS PRN
Status: CANCELLED | OUTPATIENT
Start: 2023-05-04

## 2023-05-04 RX ORDER — DIPHENHYDRAMINE HYDROCHLORIDE 50 MG/ML
50 INJECTION INTRAMUSCULAR; INTRAVENOUS ONCE
OUTPATIENT
Start: 2023-05-11 | End: 2023-05-11

## 2023-05-04 RX ORDER — ONDANSETRON 2 MG/ML
8 INJECTION INTRAMUSCULAR; INTRAVENOUS
OUTPATIENT
Start: 2023-05-18

## 2023-05-04 RX ORDER — SODIUM CHLORIDE 9 MG/ML
5-250 INJECTION, SOLUTION INTRAVENOUS PRN
OUTPATIENT
Start: 2023-05-11

## 2023-05-04 RX ORDER — EPINEPHRINE 1 MG/ML
0.3 INJECTION, SOLUTION, CONCENTRATE INTRAVENOUS PRN
OUTPATIENT
Start: 2023-05-11

## 2023-05-04 RX ORDER — FAMOTIDINE 10 MG/ML
20 INJECTION, SOLUTION INTRAVENOUS
OUTPATIENT
Start: 2023-05-18

## 2023-05-04 RX ORDER — DIPHENHYDRAMINE HYDROCHLORIDE 50 MG/ML
50 INJECTION INTRAMUSCULAR; INTRAVENOUS ONCE
OUTPATIENT
Start: 2023-05-18 | End: 2023-05-18

## 2023-05-04 RX ORDER — FAMOTIDINE 10 MG/ML
20 INJECTION, SOLUTION INTRAVENOUS ONCE
OUTPATIENT
Start: 2023-05-11 | End: 2023-05-11

## 2023-05-04 RX ORDER — PALONOSETRON 0.05 MG/ML
0.25 INJECTION, SOLUTION INTRAVENOUS ONCE
Status: COMPLETED | OUTPATIENT
Start: 2023-05-04 | End: 2023-05-04

## 2023-05-04 RX ORDER — EPINEPHRINE 1 MG/ML
0.3 INJECTION, SOLUTION, CONCENTRATE INTRAVENOUS PRN
OUTPATIENT
Start: 2023-05-18

## 2023-05-04 RX ORDER — ACETAMINOPHEN 325 MG/1
650 TABLET ORAL
OUTPATIENT
Start: 2023-05-11

## 2023-05-04 RX ORDER — SODIUM CHLORIDE 9 MG/ML
INJECTION, SOLUTION INTRAVENOUS CONTINUOUS
OUTPATIENT
Start: 2023-05-11

## 2023-05-04 RX ORDER — FAMOTIDINE 10 MG/ML
20 INJECTION, SOLUTION INTRAVENOUS
OUTPATIENT
Start: 2023-05-11

## 2023-05-04 RX ORDER — MEPERIDINE HYDROCHLORIDE 50 MG/ML
12.5 INJECTION INTRAMUSCULAR; INTRAVENOUS; SUBCUTANEOUS PRN
OUTPATIENT
Start: 2023-05-18

## 2023-05-04 RX ORDER — FAMOTIDINE 10 MG/ML
20 INJECTION, SOLUTION INTRAVENOUS
Status: CANCELLED | OUTPATIENT
Start: 2023-05-04

## 2023-05-04 RX ORDER — ALBUTEROL SULFATE 90 UG/1
4 AEROSOL, METERED RESPIRATORY (INHALATION) PRN
OUTPATIENT
Start: 2023-05-11

## 2023-05-04 RX ORDER — DIPHENHYDRAMINE HYDROCHLORIDE 50 MG/ML
50 INJECTION INTRAMUSCULAR; INTRAVENOUS ONCE
Status: CANCELLED | OUTPATIENT
Start: 2023-05-04 | End: 2023-05-04

## 2023-05-04 RX ORDER — SODIUM CHLORIDE 9 MG/ML
5-250 INJECTION, SOLUTION INTRAVENOUS PRN
Status: DISCONTINUED | OUTPATIENT
Start: 2023-05-04 | End: 2023-05-05 | Stop reason: HOSPADM

## 2023-05-04 RX ORDER — DIPHENHYDRAMINE HYDROCHLORIDE 50 MG/ML
50 INJECTION INTRAMUSCULAR; INTRAVENOUS ONCE
Status: COMPLETED | OUTPATIENT
Start: 2023-05-04 | End: 2023-05-04

## 2023-05-04 RX ORDER — MEPERIDINE HYDROCHLORIDE 50 MG/ML
12.5 INJECTION INTRAMUSCULAR; INTRAVENOUS; SUBCUTANEOUS PRN
OUTPATIENT
Start: 2023-05-11

## 2023-05-04 RX ORDER — SODIUM CHLORIDE 0.9 % (FLUSH) 0.9 %
5-40 SYRINGE (ML) INJECTION PRN
Status: DISCONTINUED | OUTPATIENT
Start: 2023-05-04 | End: 2023-05-05 | Stop reason: HOSPADM

## 2023-05-04 RX ORDER — FAMOTIDINE 10 MG/ML
20 INJECTION, SOLUTION INTRAVENOUS ONCE
OUTPATIENT
Start: 2023-05-18 | End: 2023-05-18

## 2023-05-04 RX ORDER — SODIUM CHLORIDE 0.9 % (FLUSH) 0.9 %
5-40 SYRINGE (ML) INJECTION PRN
OUTPATIENT
Start: 2023-05-11

## 2023-05-04 RX ORDER — SODIUM CHLORIDE 0.9 % (FLUSH) 0.9 %
5-40 SYRINGE (ML) INJECTION PRN
OUTPATIENT
Start: 2023-05-18

## 2023-05-04 RX ADMIN — FAMOTIDINE 20 MG: 10 INJECTION, SOLUTION INTRAVENOUS at 09:04

## 2023-05-04 RX ADMIN — DEXAMETHASONE SODIUM PHOSPHATE: 10 INJECTION, SOLUTION INTRAMUSCULAR; INTRAVENOUS at 09:05

## 2023-05-04 RX ADMIN — DIPHENHYDRAMINE HYDROCHLORIDE 50 MG: 50 INJECTION, SOLUTION INTRAMUSCULAR; INTRAVENOUS at 09:04

## 2023-05-04 RX ADMIN — SODIUM CHLORIDE, PRESERVATIVE FREE 10 ML: 5 INJECTION INTRAVENOUS at 10:20

## 2023-05-04 RX ADMIN — HEPARIN 500 UNITS: 100 SYRINGE at 10:20

## 2023-05-04 RX ADMIN — SODIUM CHLORIDE 50 ML/HR: 9 INJECTION, SOLUTION INTRAVENOUS at 09:04

## 2023-05-04 RX ADMIN — PALONOSETRON 0.25 MG: 0.05 INJECTION, SOLUTION INTRAVENOUS at 09:04

## 2023-05-04 RX ADMIN — PACLITAXEL 156 MG: 6 INJECTION, SOLUTION, CONCENTRATE INTRAVENOUS at 09:15

## 2023-05-04 NOTE — PROGRESS NOTES
Lab Results   Component Value Date    WBC 2.99 (L) 05/04/2023    HGB 9.3 (L) 05/04/2023    HCT 29.7 (L) 05/04/2023    MCV 83.4 05/04/2023     05/04/2023     Lab Results   Component Value Date    NEUTROABS 1.85 05/04/2023

## 2023-05-09 ENCOUNTER — HOSPITAL ENCOUNTER (OUTPATIENT)
Dept: WOMENS IMAGING | Age: 68
Discharge: HOME | End: 2023-05-09
Payer: MEDICARE

## 2023-05-09 DIAGNOSIS — Z17.0 MALIGNANT NEOPLASM OF LOWER-INNER QUADRANT OF RIGHT BREAST OF FEMALE, ESTROGEN RECEPTOR POSITIVE (HCC): ICD-10-CM

## 2023-05-09 DIAGNOSIS — C50.311 MALIGNANT NEOPLASM OF LOWER-INNER QUADRANT OF RIGHT BREAST OF FEMALE, ESTROGEN RECEPTOR POSITIVE (HCC): ICD-10-CM

## 2023-05-09 PROCEDURE — 76642 ULTRASOUND BREAST LIMITED: CPT

## 2023-05-09 PROCEDURE — 76642 ULTRASOUND BREAST LIMITED: CPT | Performed by: RADIOLOGY

## 2023-05-12 ENCOUNTER — HOSPITAL ENCOUNTER (OUTPATIENT)
Dept: INFUSION THERAPY | Age: 68
Discharge: HOME | End: 2023-05-12
Payer: MEDICARE

## 2023-05-12 VITALS
BODY MASS INDEX: 26.99 KG/M2 | HEIGHT: 68 IN | WEIGHT: 178.1 LBS | DIASTOLIC BLOOD PRESSURE: 73 MMHG | RESPIRATION RATE: 16 BRPM | HEART RATE: 84 BPM | SYSTOLIC BLOOD PRESSURE: 141 MMHG | OXYGEN SATURATION: 97 % | TEMPERATURE: 98.1 F

## 2023-05-12 DIAGNOSIS — Z17.0 MALIGNANT NEOPLASM OF UPPER-INNER QUADRANT OF RIGHT BREAST IN FEMALE, ESTROGEN RECEPTOR POSITIVE (HCC): ICD-10-CM

## 2023-05-12 DIAGNOSIS — Z17.0 MALIGNANT NEOPLASM OF LOWER-INNER QUADRANT OF RIGHT BREAST OF FEMALE, ESTROGEN RECEPTOR POSITIVE (HCC): Primary | ICD-10-CM

## 2023-05-12 DIAGNOSIS — C50.311 MALIGNANT NEOPLASM OF LOWER-INNER QUADRANT OF RIGHT BREAST OF FEMALE, ESTROGEN RECEPTOR POSITIVE (HCC): Primary | ICD-10-CM

## 2023-05-12 DIAGNOSIS — C50.211 MALIGNANT NEOPLASM OF UPPER-INNER QUADRANT OF RIGHT BREAST IN FEMALE, ESTROGEN RECEPTOR POSITIVE (HCC): ICD-10-CM

## 2023-05-12 LAB
ALBUMIN SERPL-MCNC: 3.6 G/DL (ref 3.5–5.2)
ALP SERPL-CCNC: 58 U/L (ref 35–104)
ALT SERPL-CCNC: 32 U/L (ref 9–52)
ANION GAP SERPL CALCULATED.3IONS-SCNC: 7 MMOL/L (ref 7–19)
AST SERPL-CCNC: 30 U/L (ref 14–36)
BILIRUB SERPL-MCNC: 0.6 MG/DL (ref 0.2–1.3)
BUN SERPL-MCNC: 10 MG/DL (ref 7–17)
CALCIUM SERPL-MCNC: 8.7 MG/DL (ref 8.4–10.2)
CHLORIDE SERPL-SCNC: 104 MMOL/L (ref 98–111)
CO2 SERPL-SCNC: 28 MMOL/L (ref 22–29)
CREAT SERPL-MCNC: 0.6 MG/DL (ref 0.5–1)
ERYTHROCYTE [DISTWIDTH] IN BLOOD BY AUTOMATED COUNT: 20.8 % (ref 11.7–14.4)
GLOBULIN: 2.9 G/DL
GLUCOSE SERPL-MCNC: 129 MG/DL (ref 74–106)
HCT VFR BLD AUTO: 29.1 % (ref 34.1–44.9)
HGB BLD-MCNC: 9.2 G/DL (ref 11.2–15.7)
LYMPHOCYTES # BLD: 0.58 K/UL (ref 1.18–3.74)
LYMPHOCYTES NFR BLD: 21.2 % (ref 19.3–53.1)
MCH RBC QN AUTO: 26.9 PG (ref 25.6–32.2)
MCHC RBC AUTO-ENTMCNC: 31.6 G/DL (ref 32.3–35.5)
MCV RBC AUTO: 85.1 FL (ref 79.4–94.8)
MONOCYTES # BLD: 0.42 K/UL (ref 0.24–0.82)
MONOCYTES NFR BLD: 15.3 % (ref 4.7–12.5)
NEUTROPHILS # BLD: 1.59 K/UL (ref 1.56–6.13)
NEUTS SEG NFR BLD: 57.9 % (ref 34–71.1)
PLATELET # BLD AUTO: 192 K/UL (ref 182–369)
PMV BLD AUTO: 8.5 FL (ref 7.4–10.4)
POTASSIUM SERPL-SCNC: 3.6 MMOL/L (ref 3.5–5.1)
PROT SERPL-MCNC: 6.5 G/DL (ref 6.3–8.2)
RBC # BLD AUTO: 3.42 M/UL (ref 3.93–5.22)
SODIUM SERPL-SCNC: 139 MMOL/L (ref 137–145)
WBC # BLD AUTO: 2.74 K/UL (ref 3.98–10.04)

## 2023-05-12 PROCEDURE — 6360000002 HC RX W HCPCS: Performed by: INTERNAL MEDICINE

## 2023-05-12 PROCEDURE — 2580000003 HC RX 258: Performed by: INTERNAL MEDICINE

## 2023-05-12 PROCEDURE — 80053 COMPREHEN METABOLIC PANEL: CPT

## 2023-05-12 PROCEDURE — 85025 COMPLETE CBC W/AUTO DIFF WBC: CPT

## 2023-05-12 PROCEDURE — 96413 CHEMO IV INFUSION 1 HR: CPT

## 2023-05-12 PROCEDURE — 2500000003 HC RX 250 WO HCPCS: Performed by: INTERNAL MEDICINE

## 2023-05-12 PROCEDURE — 36415 COLL VENOUS BLD VENIPUNCTURE: CPT

## 2023-05-12 PROCEDURE — 96375 TX/PRO/DX INJ NEW DRUG ADDON: CPT

## 2023-05-12 RX ORDER — FAMOTIDINE 10 MG/ML
20 INJECTION, SOLUTION INTRAVENOUS ONCE
Status: COMPLETED | OUTPATIENT
Start: 2023-05-12 | End: 2023-05-12

## 2023-05-12 RX ORDER — HEPARIN SODIUM (PORCINE) LOCK FLUSH IV SOLN 100 UNIT/ML 100 UNIT/ML
500 SOLUTION INTRAVENOUS PRN
Status: DISCONTINUED | OUTPATIENT
Start: 2023-05-12 | End: 2023-05-13 | Stop reason: HOSPADM

## 2023-05-12 RX ORDER — DIPHENHYDRAMINE HYDROCHLORIDE 50 MG/ML
25 INJECTION INTRAMUSCULAR; INTRAVENOUS ONCE
Status: COMPLETED | OUTPATIENT
Start: 2023-05-12 | End: 2023-05-12

## 2023-05-12 RX ORDER — SODIUM CHLORIDE 9 MG/ML
5-250 INJECTION, SOLUTION INTRAVENOUS PRN
Status: DISCONTINUED | OUTPATIENT
Start: 2023-05-12 | End: 2023-05-13 | Stop reason: HOSPADM

## 2023-05-12 RX ORDER — SODIUM CHLORIDE 0.9 % (FLUSH) 0.9 %
5-40 SYRINGE (ML) INJECTION PRN
Status: DISCONTINUED | OUTPATIENT
Start: 2023-05-12 | End: 2023-05-13 | Stop reason: HOSPADM

## 2023-05-12 RX ORDER — PALONOSETRON 0.05 MG/ML
0.25 INJECTION, SOLUTION INTRAVENOUS ONCE
Status: COMPLETED | OUTPATIENT
Start: 2023-05-12 | End: 2023-05-12

## 2023-05-12 RX ADMIN — SODIUM CHLORIDE, PRESERVATIVE FREE 10 ML: 5 INJECTION INTRAVENOUS at 11:22

## 2023-05-12 RX ADMIN — DEXAMETHASONE SODIUM PHOSPHATE: 10 INJECTION, SOLUTION INTRAMUSCULAR; INTRAVENOUS at 10:04

## 2023-05-12 RX ADMIN — DIPHENHYDRAMINE HYDROCHLORIDE 25 MG: 50 INJECTION, SOLUTION INTRAMUSCULAR; INTRAVENOUS at 10:04

## 2023-05-12 RX ADMIN — FAMOTIDINE 20 MG: 10 INJECTION INTRAVENOUS at 10:04

## 2023-05-12 RX ADMIN — HEPARIN 500 UNITS: 100 SYRINGE at 11:22

## 2023-05-12 RX ADMIN — SODIUM CHLORIDE 25 ML/HR: 9 INJECTION, SOLUTION INTRAVENOUS at 10:03

## 2023-05-12 RX ADMIN — PACLITAXEL 156 MG: 6 INJECTION, SOLUTION, CONCENTRATE INTRAVENOUS at 10:20

## 2023-05-12 RX ADMIN — PALONOSETRON 0.25 MG: 0.05 INJECTION, SOLUTION INTRAVENOUS at 10:04

## 2023-05-16 NOTE — PROGRESS NOTES
appears grossly within normal limits, not optimally assessed   on this exam. No focally suspicious bony finding. Impression   1. RIGHT breast upper inner quadrant dominant mass, consistent with   biopsy proven malignancy. There is nonmass enhancement extending to   the medial skin, which appears to have subtle thickening, concerning   for invasion. 2. RIGHT breast lower inner quadrant 4:00 position mass, consistent   with biopsy proven malignancy. Note that the posterior aspect of the   mass is 0.4 cm from the chest wall. 3. RIGHT breast upper outer quadrant with a 0.8 cm circumscribed mass,   concerning for satellite disease. 4. Overall appearance of the RIGHT breast concerning for multicentric   disease. 5. Suspicious low-lying RIGHT axillary lymph node as above. Although   this was negative by fine-needle aspiration, the imaging appearance   remains suspicious. 6. No internal mammary or LEFT axillary adenopathy. 7. LEFT 3 cm thyroid nodule, not optimally evaluated by MRI. If not   previously performed, consider nonemergent thyroid ultrasound. BI-RADS Final Assessment Category 6: Known Biopsy-Proven Malignancy   Management Recommendation: Surgical excision when clinically   appropriate. She has not noticed any changes in her breasts     Unilateral Ultrasound-5/9/2023  FINDINGS:  The biopsy-proven malignant mass at 1:00 5 cm from the nipple has  decreased in size, currently measuring 13 x 11 x 10 mm. On prior  diagnostic ultrasound this measured 20 x 20 x 23 mm. The additional mass highly suggestive of an additional malignancy at  1:00 4 cm from the nipple has decreased in size, currently measures 10  x 6 x 8 mm. This previously measured 16 x 20 x 11 mm. The biopsy-proven malignant mass at 4:00 11 cm from the nipple has  decreased in size, currently measuring 12 x 10 x 9 mm. This previously  measured 13 x 9 x 11 mm. The right axilla was not imaged on current exam.  IMPRESSION:  1.  Right

## 2023-05-17 ENCOUNTER — OFFICE VISIT (OUTPATIENT)
Dept: SURGERY | Age: 68
End: 2023-05-17
Payer: MEDICARE

## 2023-05-17 VITALS — SYSTOLIC BLOOD PRESSURE: 128 MMHG | DIASTOLIC BLOOD PRESSURE: 80 MMHG | HEART RATE: 80 BPM

## 2023-05-17 DIAGNOSIS — Z17.0 MALIGNANT NEOPLASM OF LOWER-INNER QUADRANT OF RIGHT BREAST OF FEMALE, ESTROGEN RECEPTOR POSITIVE (HCC): Primary | ICD-10-CM

## 2023-05-17 DIAGNOSIS — C50.311 MALIGNANT NEOPLASM OF LOWER-INNER QUADRANT OF RIGHT BREAST OF FEMALE, ESTROGEN RECEPTOR POSITIVE (HCC): Primary | ICD-10-CM

## 2023-05-17 PROCEDURE — 1123F ACP DISCUSS/DSCN MKR DOCD: CPT | Performed by: SURGERY

## 2023-05-17 PROCEDURE — 99213 OFFICE O/P EST LOW 20 MIN: CPT | Performed by: SURGERY

## 2023-05-18 ENCOUNTER — TELEPHONE (OUTPATIENT)
Dept: SURGERY | Age: 68
End: 2023-05-18

## 2023-05-19 ENCOUNTER — HOSPITAL ENCOUNTER (OUTPATIENT)
Dept: INFUSION THERAPY | Age: 68
Discharge: HOME | End: 2023-05-19
Payer: MEDICARE

## 2023-05-19 VITALS
HEART RATE: 93 BPM | DIASTOLIC BLOOD PRESSURE: 64 MMHG | BODY MASS INDEX: 27.19 KG/M2 | RESPIRATION RATE: 18 BRPM | SYSTOLIC BLOOD PRESSURE: 135 MMHG | WEIGHT: 179.4 LBS | OXYGEN SATURATION: 99 % | HEIGHT: 68 IN | TEMPERATURE: 98.5 F

## 2023-05-19 DIAGNOSIS — C50.311 MALIGNANT NEOPLASM OF LOWER-INNER QUADRANT OF RIGHT BREAST OF FEMALE, ESTROGEN RECEPTOR POSITIVE (HCC): Primary | ICD-10-CM

## 2023-05-19 DIAGNOSIS — C50.211 MALIGNANT NEOPLASM OF UPPER-INNER QUADRANT OF RIGHT BREAST IN FEMALE, ESTROGEN RECEPTOR POSITIVE (HCC): ICD-10-CM

## 2023-05-19 DIAGNOSIS — Z17.0 MALIGNANT NEOPLASM OF LOWER-INNER QUADRANT OF RIGHT BREAST OF FEMALE, ESTROGEN RECEPTOR POSITIVE (HCC): Primary | ICD-10-CM

## 2023-05-19 DIAGNOSIS — Z17.0 MALIGNANT NEOPLASM OF UPPER-INNER QUADRANT OF RIGHT BREAST IN FEMALE, ESTROGEN RECEPTOR POSITIVE (HCC): ICD-10-CM

## 2023-05-19 LAB
ALBUMIN SERPL-MCNC: 3.8 G/DL (ref 3.5–5.2)
ALP SERPL-CCNC: 68 U/L (ref 35–104)
ALT SERPL-CCNC: 31 U/L (ref 9–52)
ANION GAP SERPL CALCULATED.3IONS-SCNC: 8 MMOL/L (ref 7–19)
AST SERPL-CCNC: 34 U/L (ref 14–36)
BILIRUB SERPL-MCNC: 1.2 MG/DL (ref 0.2–1.3)
BUN SERPL-MCNC: 12 MG/DL (ref 7–17)
CALCIUM SERPL-MCNC: 9 MG/DL (ref 8.4–10.2)
CHLORIDE SERPL-SCNC: 103 MMOL/L (ref 98–111)
CO2 SERPL-SCNC: 27 MMOL/L (ref 22–29)
CREAT SERPL-MCNC: 0.6 MG/DL (ref 0.5–1)
ERYTHROCYTE [DISTWIDTH] IN BLOOD BY AUTOMATED COUNT: 19.3 % (ref 11.7–14.4)
GLOBULIN: 2.8 G/DL
GLUCOSE SERPL-MCNC: 102 MG/DL (ref 74–106)
HCT VFR BLD AUTO: 29.9 % (ref 34.1–44.9)
HGB BLD-MCNC: 9.4 G/DL (ref 11.2–15.7)
LYMPHOCYTES # BLD: 0.56 K/UL (ref 1.18–3.74)
LYMPHOCYTES NFR BLD: 14.5 % (ref 19.3–53.1)
MCH RBC QN AUTO: 26.8 PG (ref 25.6–32.2)
MCHC RBC AUTO-ENTMCNC: 31.4 G/DL (ref 32.3–35.5)
MCV RBC AUTO: 85.2 FL (ref 79.4–94.8)
MONOCYTES # BLD: 0.49 K/UL (ref 0.24–0.82)
MONOCYTES NFR BLD: 12.7 % (ref 4.7–12.5)
NEUTROPHILS # BLD: 2.57 K/UL (ref 1.56–6.13)
NEUTS SEG NFR BLD: 66.9 % (ref 34–71.1)
PLATELET # BLD AUTO: 233 K/UL (ref 182–369)
PMV BLD AUTO: 9.4 FL (ref 7.4–10.4)
POTASSIUM SERPL-SCNC: 3.7 MMOL/L (ref 3.5–5.1)
PROT SERPL-MCNC: 6.6 G/DL (ref 6.3–8.2)
RBC # BLD AUTO: 3.51 M/UL (ref 3.93–5.22)
SODIUM SERPL-SCNC: 138 MMOL/L (ref 137–145)
WBC # BLD AUTO: 3.85 K/UL (ref 3.98–10.04)

## 2023-05-19 PROCEDURE — 80053 COMPREHEN METABOLIC PANEL: CPT

## 2023-05-19 PROCEDURE — 96413 CHEMO IV INFUSION 1 HR: CPT

## 2023-05-19 PROCEDURE — 2580000003 HC RX 258: Performed by: INTERNAL MEDICINE

## 2023-05-19 PROCEDURE — 6360000002 HC RX W HCPCS: Performed by: INTERNAL MEDICINE

## 2023-05-19 PROCEDURE — 2500000003 HC RX 250 WO HCPCS: Performed by: INTERNAL MEDICINE

## 2023-05-19 PROCEDURE — 85025 COMPLETE CBC W/AUTO DIFF WBC: CPT

## 2023-05-19 PROCEDURE — 36415 COLL VENOUS BLD VENIPUNCTURE: CPT

## 2023-05-19 PROCEDURE — 96375 TX/PRO/DX INJ NEW DRUG ADDON: CPT

## 2023-05-19 RX ORDER — PALONOSETRON 0.05 MG/ML
0.25 INJECTION, SOLUTION INTRAVENOUS ONCE
Status: COMPLETED | OUTPATIENT
Start: 2023-05-19 | End: 2023-05-19

## 2023-05-19 RX ORDER — SODIUM CHLORIDE 9 MG/ML
5-250 INJECTION, SOLUTION INTRAVENOUS PRN
Status: DISCONTINUED | OUTPATIENT
Start: 2023-05-19 | End: 2023-05-20 | Stop reason: HOSPADM

## 2023-05-19 RX ORDER — SODIUM CHLORIDE 0.9 % (FLUSH) 0.9 %
5-40 SYRINGE (ML) INJECTION PRN
Status: DISCONTINUED | OUTPATIENT
Start: 2023-05-19 | End: 2023-05-20 | Stop reason: HOSPADM

## 2023-05-19 RX ORDER — FAMOTIDINE 10 MG/ML
20 INJECTION, SOLUTION INTRAVENOUS ONCE
Status: COMPLETED | OUTPATIENT
Start: 2023-05-19 | End: 2023-05-19

## 2023-05-19 RX ORDER — HEPARIN SODIUM (PORCINE) LOCK FLUSH IV SOLN 100 UNIT/ML 100 UNIT/ML
500 SOLUTION INTRAVENOUS PRN
Status: DISCONTINUED | OUTPATIENT
Start: 2023-05-19 | End: 2023-05-20 | Stop reason: HOSPADM

## 2023-05-19 RX ORDER — DIPHENHYDRAMINE HYDROCHLORIDE 50 MG/ML
50 INJECTION INTRAMUSCULAR; INTRAVENOUS ONCE
Status: COMPLETED | OUTPATIENT
Start: 2023-05-19 | End: 2023-05-19

## 2023-05-19 RX ADMIN — PALONOSETRON 0.25 MG: 0.05 INJECTION, SOLUTION INTRAVENOUS at 14:51

## 2023-05-19 RX ADMIN — PACLITAXEL 156 MG: 6 INJECTION, SOLUTION, CONCENTRATE INTRAVENOUS at 15:05

## 2023-05-19 RX ADMIN — DIPHENHYDRAMINE HYDROCHLORIDE 50 MG: 50 INJECTION, SOLUTION INTRAMUSCULAR; INTRAVENOUS at 14:51

## 2023-05-19 RX ADMIN — HEPARIN 500 UNITS: 100 SYRINGE at 16:08

## 2023-05-19 RX ADMIN — SODIUM CHLORIDE, PRESERVATIVE FREE 10 ML: 5 INJECTION INTRAVENOUS at 16:08

## 2023-05-19 RX ADMIN — SODIUM CHLORIDE 250 ML/HR: 9 INJECTION, SOLUTION INTRAVENOUS at 14:49

## 2023-05-19 RX ADMIN — FAMOTIDINE 20 MG: 10 INJECTION, SOLUTION INTRAVENOUS at 14:51

## 2023-05-19 RX ADMIN — DEXAMETHASONE SODIUM PHOSPHATE 10 MG: 10 INJECTION, SOLUTION INTRAMUSCULAR; INTRAVENOUS at 14:51

## 2023-05-26 ENCOUNTER — HOSPITAL ENCOUNTER (OUTPATIENT)
Dept: INFUSION THERAPY | Age: 68
Discharge: HOME | End: 2023-05-26
Payer: MEDICARE

## 2023-05-26 VITALS
BODY MASS INDEX: 26.81 KG/M2 | TEMPERATURE: 98.9 F | RESPIRATION RATE: 16 BRPM | SYSTOLIC BLOOD PRESSURE: 137 MMHG | HEIGHT: 68 IN | HEART RATE: 87 BPM | OXYGEN SATURATION: 99 % | DIASTOLIC BLOOD PRESSURE: 58 MMHG | WEIGHT: 176.9 LBS

## 2023-05-26 DIAGNOSIS — C50.211 MALIGNANT NEOPLASM OF UPPER-INNER QUADRANT OF RIGHT BREAST IN FEMALE, ESTROGEN RECEPTOR POSITIVE (HCC): Primary | ICD-10-CM

## 2023-05-26 DIAGNOSIS — Z17.0 MALIGNANT NEOPLASM OF UPPER-INNER QUADRANT OF RIGHT BREAST IN FEMALE, ESTROGEN RECEPTOR POSITIVE (HCC): Primary | ICD-10-CM

## 2023-05-26 DIAGNOSIS — Z17.0 MALIGNANT NEOPLASM OF LOWER-INNER QUADRANT OF RIGHT BREAST OF FEMALE, ESTROGEN RECEPTOR POSITIVE (HCC): Primary | ICD-10-CM

## 2023-05-26 DIAGNOSIS — C50.311 MALIGNANT NEOPLASM OF LOWER-INNER QUADRANT OF RIGHT BREAST OF FEMALE, ESTROGEN RECEPTOR POSITIVE (HCC): Primary | ICD-10-CM

## 2023-05-26 DIAGNOSIS — C50.211 MALIGNANT NEOPLASM OF UPPER-INNER QUADRANT OF RIGHT BREAST IN FEMALE, ESTROGEN RECEPTOR POSITIVE (HCC): ICD-10-CM

## 2023-05-26 DIAGNOSIS — Z17.0 MALIGNANT NEOPLASM OF UPPER-INNER QUADRANT OF RIGHT BREAST IN FEMALE, ESTROGEN RECEPTOR POSITIVE (HCC): ICD-10-CM

## 2023-05-26 DIAGNOSIS — Z17.0 MALIGNANT NEOPLASM OF LOWER-INNER QUADRANT OF RIGHT BREAST OF FEMALE, ESTROGEN RECEPTOR POSITIVE (HCC): ICD-10-CM

## 2023-05-26 DIAGNOSIS — C50.311 MALIGNANT NEOPLASM OF LOWER-INNER QUADRANT OF RIGHT BREAST OF FEMALE, ESTROGEN RECEPTOR POSITIVE (HCC): ICD-10-CM

## 2023-05-26 LAB
ALBUMIN SERPL-MCNC: 3.8 G/DL (ref 3.5–5.2)
ALP SERPL-CCNC: 60 U/L (ref 35–104)
ALT SERPL-CCNC: 30 U/L (ref 9–52)
ANION GAP SERPL CALCULATED.3IONS-SCNC: 9 MMOL/L (ref 7–19)
AST SERPL-CCNC: 28 U/L (ref 14–36)
BILIRUB SERPL-MCNC: 0.6 MG/DL (ref 0.2–1.3)
BUN SERPL-MCNC: 8 MG/DL (ref 7–17)
CALCIUM SERPL-MCNC: 9 MG/DL (ref 8.4–10.2)
CHLORIDE SERPL-SCNC: 103 MMOL/L (ref 98–111)
CO2 SERPL-SCNC: 27 MMOL/L (ref 22–29)
CREAT SERPL-MCNC: 0.6 MG/DL (ref 0.5–1)
ERYTHROCYTE [DISTWIDTH] IN BLOOD BY AUTOMATED COUNT: 18.3 % (ref 11.7–14.4)
GLOBULIN: 2.9 G/DL
GLUCOSE SERPL-MCNC: 137 MG/DL (ref 74–106)
HCT VFR BLD AUTO: 29.8 % (ref 34.1–44.9)
HGB BLD-MCNC: 9.3 G/DL (ref 11.2–15.7)
LYMPHOCYTES # BLD: 0.51 K/UL (ref 1.18–3.74)
LYMPHOCYTES NFR BLD: 17.1 % (ref 19.3–53.1)
MCH RBC QN AUTO: 26.9 PG (ref 25.6–32.2)
MCHC RBC AUTO-ENTMCNC: 31.2 G/DL (ref 32.3–35.5)
MCV RBC AUTO: 86.1 FL (ref 79.4–94.8)
MONOCYTES # BLD: 0.41 K/UL (ref 0.24–0.82)
MONOCYTES NFR BLD: 13.8 % (ref 4.7–12.5)
NEUTROPHILS # BLD: 1.84 K/UL (ref 1.56–6.13)
NEUTS SEG NFR BLD: 61.7 % (ref 34–71.1)
PLATELET # BLD AUTO: 288 K/UL (ref 182–369)
PMV BLD AUTO: 9.2 FL (ref 7.4–10.4)
POTASSIUM SERPL-SCNC: 3.7 MMOL/L (ref 3.5–5.1)
PROT SERPL-MCNC: 6.7 G/DL (ref 6.3–8.2)
RBC # BLD AUTO: 3.46 M/UL (ref 3.93–5.22)
SODIUM SERPL-SCNC: 139 MMOL/L (ref 137–145)
WBC # BLD AUTO: 2.98 K/UL (ref 3.98–10.04)

## 2023-05-26 PROCEDURE — 96413 CHEMO IV INFUSION 1 HR: CPT

## 2023-05-26 PROCEDURE — 80053 COMPREHEN METABOLIC PANEL: CPT

## 2023-05-26 PROCEDURE — 2500000003 HC RX 250 WO HCPCS: Performed by: INTERNAL MEDICINE

## 2023-05-26 PROCEDURE — 96375 TX/PRO/DX INJ NEW DRUG ADDON: CPT

## 2023-05-26 PROCEDURE — 85025 COMPLETE CBC W/AUTO DIFF WBC: CPT

## 2023-05-26 PROCEDURE — 6360000002 HC RX W HCPCS: Performed by: INTERNAL MEDICINE

## 2023-05-26 PROCEDURE — 2580000003 HC RX 258: Performed by: INTERNAL MEDICINE

## 2023-05-26 RX ORDER — HEPARIN SODIUM (PORCINE) LOCK FLUSH IV SOLN 100 UNIT/ML 100 UNIT/ML
500 SOLUTION INTRAVENOUS PRN
Status: DISCONTINUED | OUTPATIENT
Start: 2023-05-26 | End: 2023-05-27 | Stop reason: HOSPADM

## 2023-05-26 RX ORDER — HEPARIN SODIUM (PORCINE) LOCK FLUSH IV SOLN 100 UNIT/ML 100 UNIT/ML
500 SOLUTION INTRAVENOUS PRN
OUTPATIENT
Start: 2023-06-09

## 2023-05-26 RX ORDER — MEPERIDINE HYDROCHLORIDE 50 MG/ML
12.5 INJECTION INTRAMUSCULAR; INTRAVENOUS; SUBCUTANEOUS PRN
OUTPATIENT
Start: 2023-06-02

## 2023-05-26 RX ORDER — FAMOTIDINE 10 MG/ML
20 INJECTION, SOLUTION INTRAVENOUS
Status: CANCELLED | OUTPATIENT
Start: 2023-05-26

## 2023-05-26 RX ORDER — ACETAMINOPHEN 325 MG/1
650 TABLET ORAL
OUTPATIENT
Start: 2023-06-02

## 2023-05-26 RX ORDER — SODIUM CHLORIDE 9 MG/ML
5-250 INJECTION, SOLUTION INTRAVENOUS PRN
Status: CANCELLED | OUTPATIENT
Start: 2023-05-26

## 2023-05-26 RX ORDER — ONDANSETRON 2 MG/ML
8 INJECTION INTRAMUSCULAR; INTRAVENOUS
OUTPATIENT
Start: 2023-06-02

## 2023-05-26 RX ORDER — DIPHENHYDRAMINE HYDROCHLORIDE 50 MG/ML
50 INJECTION INTRAMUSCULAR; INTRAVENOUS
OUTPATIENT
Start: 2023-06-02

## 2023-05-26 RX ORDER — FAMOTIDINE 10 MG/ML
20 INJECTION, SOLUTION INTRAVENOUS
OUTPATIENT
Start: 2023-06-02

## 2023-05-26 RX ORDER — SODIUM CHLORIDE 0.9 % (FLUSH) 0.9 %
5-40 SYRINGE (ML) INJECTION PRN
Status: CANCELLED | OUTPATIENT
Start: 2023-05-26

## 2023-05-26 RX ORDER — SODIUM CHLORIDE 9 MG/ML
INJECTION, SOLUTION INTRAVENOUS CONTINUOUS
Status: CANCELLED | OUTPATIENT
Start: 2023-05-26

## 2023-05-26 RX ORDER — MEPERIDINE HYDROCHLORIDE 50 MG/ML
12.5 INJECTION INTRAMUSCULAR; INTRAVENOUS; SUBCUTANEOUS PRN
Status: CANCELLED | OUTPATIENT
Start: 2023-05-26

## 2023-05-26 RX ORDER — HEPARIN SODIUM (PORCINE) LOCK FLUSH IV SOLN 100 UNIT/ML 100 UNIT/ML
500 SOLUTION INTRAVENOUS PRN
Status: CANCELLED | OUTPATIENT
Start: 2023-05-26

## 2023-05-26 RX ORDER — SODIUM CHLORIDE 9 MG/ML
5-250 INJECTION, SOLUTION INTRAVENOUS PRN
OUTPATIENT
Start: 2023-06-09

## 2023-05-26 RX ORDER — SODIUM CHLORIDE 0.9 % (FLUSH) 0.9 %
5-40 SYRINGE (ML) INJECTION PRN
OUTPATIENT
Start: 2023-06-02

## 2023-05-26 RX ORDER — SODIUM CHLORIDE 9 MG/ML
INJECTION, SOLUTION INTRAVENOUS CONTINUOUS
OUTPATIENT
Start: 2023-06-02

## 2023-05-26 RX ORDER — EPINEPHRINE 1 MG/ML
0.3 INJECTION, SOLUTION, CONCENTRATE INTRAVENOUS PRN
Status: CANCELLED | OUTPATIENT
Start: 2023-05-26

## 2023-05-26 RX ORDER — SODIUM CHLORIDE 9 MG/ML
5-250 INJECTION, SOLUTION INTRAVENOUS PRN
Status: DISCONTINUED | OUTPATIENT
Start: 2023-05-26 | End: 2023-05-27 | Stop reason: HOSPADM

## 2023-05-26 RX ORDER — ALBUTEROL SULFATE 90 UG/1
4 AEROSOL, METERED RESPIRATORY (INHALATION) PRN
OUTPATIENT
Start: 2023-06-09

## 2023-05-26 RX ORDER — SODIUM CHLORIDE 9 MG/ML
5-250 INJECTION, SOLUTION INTRAVENOUS PRN
OUTPATIENT
Start: 2023-06-02

## 2023-05-26 RX ORDER — DIPHENHYDRAMINE HYDROCHLORIDE 50 MG/ML
50 INJECTION INTRAMUSCULAR; INTRAVENOUS
OUTPATIENT
Start: 2023-06-09

## 2023-05-26 RX ORDER — EPINEPHRINE 1 MG/ML
0.3 INJECTION, SOLUTION, CONCENTRATE INTRAVENOUS PRN
OUTPATIENT
Start: 2023-06-09

## 2023-05-26 RX ORDER — ALBUTEROL SULFATE 90 UG/1
4 AEROSOL, METERED RESPIRATORY (INHALATION) PRN
Status: CANCELLED | OUTPATIENT
Start: 2023-05-26

## 2023-05-26 RX ORDER — MEPERIDINE HYDROCHLORIDE 50 MG/ML
12.5 INJECTION INTRAMUSCULAR; INTRAVENOUS; SUBCUTANEOUS PRN
OUTPATIENT
Start: 2023-06-09

## 2023-05-26 RX ORDER — PALONOSETRON 0.05 MG/ML
0.25 INJECTION, SOLUTION INTRAVENOUS ONCE
Status: COMPLETED | OUTPATIENT
Start: 2023-05-26 | End: 2023-05-26

## 2023-05-26 RX ORDER — ACETAMINOPHEN 325 MG/1
650 TABLET ORAL
Status: CANCELLED | OUTPATIENT
Start: 2023-05-26

## 2023-05-26 RX ORDER — FAMOTIDINE 10 MG/ML
20 INJECTION, SOLUTION INTRAVENOUS ONCE
Status: COMPLETED | OUTPATIENT
Start: 2023-05-26 | End: 2023-05-26

## 2023-05-26 RX ORDER — DIPHENHYDRAMINE HYDROCHLORIDE 50 MG/ML
50 INJECTION INTRAMUSCULAR; INTRAVENOUS
Status: CANCELLED | OUTPATIENT
Start: 2023-05-26

## 2023-05-26 RX ORDER — DIPHENHYDRAMINE HYDROCHLORIDE 50 MG/ML
50 INJECTION INTRAMUSCULAR; INTRAVENOUS ONCE
OUTPATIENT
Start: 2023-06-09 | End: 2023-06-09

## 2023-05-26 RX ORDER — DIPHENHYDRAMINE HYDROCHLORIDE 50 MG/ML
50 INJECTION INTRAMUSCULAR; INTRAVENOUS ONCE
OUTPATIENT
Start: 2023-06-02 | End: 2023-06-02

## 2023-05-26 RX ORDER — ONDANSETRON 2 MG/ML
8 INJECTION INTRAMUSCULAR; INTRAVENOUS
Status: CANCELLED | OUTPATIENT
Start: 2023-05-26

## 2023-05-26 RX ORDER — DIPHENHYDRAMINE HYDROCHLORIDE 50 MG/ML
50 INJECTION INTRAMUSCULAR; INTRAVENOUS ONCE
Status: COMPLETED | OUTPATIENT
Start: 2023-05-26 | End: 2023-05-26

## 2023-05-26 RX ORDER — SODIUM CHLORIDE 0.9 % (FLUSH) 0.9 %
5-40 SYRINGE (ML) INJECTION PRN
OUTPATIENT
Start: 2023-06-09

## 2023-05-26 RX ORDER — ACETAMINOPHEN 325 MG/1
650 TABLET ORAL
OUTPATIENT
Start: 2023-06-09

## 2023-05-26 RX ORDER — ALBUTEROL SULFATE 90 UG/1
4 AEROSOL, METERED RESPIRATORY (INHALATION) PRN
OUTPATIENT
Start: 2023-06-02

## 2023-05-26 RX ORDER — FAMOTIDINE 10 MG/ML
20 INJECTION, SOLUTION INTRAVENOUS ONCE
OUTPATIENT
Start: 2023-06-02 | End: 2023-06-02

## 2023-05-26 RX ORDER — PALONOSETRON 0.05 MG/ML
0.25 INJECTION, SOLUTION INTRAVENOUS ONCE
Start: 2023-06-02 | End: 2023-06-02

## 2023-05-26 RX ORDER — PALONOSETRON 0.05 MG/ML
0.25 INJECTION, SOLUTION INTRAVENOUS ONCE
Start: 2023-06-09 | End: 2023-06-09

## 2023-05-26 RX ORDER — ONDANSETRON 2 MG/ML
8 INJECTION INTRAMUSCULAR; INTRAVENOUS
OUTPATIENT
Start: 2023-06-09

## 2023-05-26 RX ORDER — SODIUM CHLORIDE 0.9 % (FLUSH) 0.9 %
5-40 SYRINGE (ML) INJECTION PRN
Status: DISCONTINUED | OUTPATIENT
Start: 2023-05-26 | End: 2023-05-27 | Stop reason: HOSPADM

## 2023-05-26 RX ORDER — EPINEPHRINE 1 MG/ML
0.3 INJECTION, SOLUTION, CONCENTRATE INTRAVENOUS PRN
OUTPATIENT
Start: 2023-06-02

## 2023-05-26 RX ORDER — HEPARIN SODIUM (PORCINE) LOCK FLUSH IV SOLN 100 UNIT/ML 100 UNIT/ML
500 SOLUTION INTRAVENOUS PRN
OUTPATIENT
Start: 2023-06-02

## 2023-05-26 RX ORDER — SODIUM CHLORIDE 9 MG/ML
INJECTION, SOLUTION INTRAVENOUS CONTINUOUS
OUTPATIENT
Start: 2023-06-09

## 2023-05-26 RX ORDER — FAMOTIDINE 10 MG/ML
20 INJECTION, SOLUTION INTRAVENOUS
OUTPATIENT
Start: 2023-06-09

## 2023-05-26 RX ORDER — FAMOTIDINE 10 MG/ML
20 INJECTION, SOLUTION INTRAVENOUS ONCE
OUTPATIENT
Start: 2023-06-09 | End: 2023-06-09

## 2023-05-26 RX ADMIN — FAMOTIDINE 20 MG: 10 INJECTION INTRAVENOUS at 08:36

## 2023-05-26 RX ADMIN — HEPARIN 500 UNITS: 100 SYRINGE at 10:06

## 2023-05-26 RX ADMIN — SODIUM CHLORIDE, PRESERVATIVE FREE 10 ML: 5 INJECTION INTRAVENOUS at 10:06

## 2023-05-26 RX ADMIN — DEXAMETHASONE SODIUM PHOSPHATE: 10 INJECTION, SOLUTION INTRAMUSCULAR; INTRAVENOUS at 08:40

## 2023-05-26 RX ADMIN — SODIUM CHLORIDE 100 ML/HR: 9 INJECTION, SOLUTION INTRAVENOUS at 08:36

## 2023-05-26 RX ADMIN — PACLITAXEL 156 MG: 6 INJECTION, SOLUTION, CONCENTRATE INTRAVENOUS at 09:01

## 2023-05-26 RX ADMIN — DIPHENHYDRAMINE HYDROCHLORIDE 50 MG: 50 INJECTION, SOLUTION INTRAMUSCULAR; INTRAVENOUS at 08:36

## 2023-05-26 RX ADMIN — PALONOSETRON 0.25 MG: 0.05 INJECTION, SOLUTION INTRAVENOUS at 08:36

## 2023-05-26 NOTE — PROGRESS NOTES
Lab Results   Component Value Date    WBC 2.98 (L) 05/26/2023    HGB 9.3 (L) 05/26/2023    HCT 29.8 (L) 05/26/2023    MCV 86.1 05/26/2023     05/26/2023     Lab Results   Component Value Date    NEUTROABS 1.84 05/26/2023     Lab Results   Component Value Date     05/26/2023    K 3.7 05/26/2023     05/26/2023    CO2 27 05/26/2023    BUN 8 05/26/2023    CREATININE 0.6 05/26/2023    GLUCOSE 137 (H) 05/26/2023    CALCIUM 9.0 05/26/2023    PROT 6.7 05/26/2023    LABALBU 3.8 05/26/2023    BILITOT 0.6 05/26/2023    ALKPHOS 60 05/26/2023    AST 28 05/26/2023    ALT 30 05/26/2023    LABGLOM >60 05/26/2023    GLOB 2.9 05/26/2023

## 2023-06-02 ENCOUNTER — HOSPITAL ENCOUNTER (OUTPATIENT)
Dept: INFUSION THERAPY | Age: 68
Discharge: HOME | End: 2023-06-02
Payer: MEDICARE

## 2023-06-02 VITALS
HEART RATE: 97 BPM | OXYGEN SATURATION: 98 % | TEMPERATURE: 98.1 F | WEIGHT: 176.9 LBS | HEIGHT: 68 IN | SYSTOLIC BLOOD PRESSURE: 132 MMHG | RESPIRATION RATE: 18 BRPM | BODY MASS INDEX: 26.81 KG/M2 | DIASTOLIC BLOOD PRESSURE: 63 MMHG

## 2023-06-02 DIAGNOSIS — Z17.0 MALIGNANT NEOPLASM OF LOWER-INNER QUADRANT OF RIGHT BREAST OF FEMALE, ESTROGEN RECEPTOR POSITIVE (HCC): Primary | ICD-10-CM

## 2023-06-02 DIAGNOSIS — C50.311 MALIGNANT NEOPLASM OF LOWER-INNER QUADRANT OF RIGHT BREAST OF FEMALE, ESTROGEN RECEPTOR POSITIVE (HCC): Primary | ICD-10-CM

## 2023-06-02 DIAGNOSIS — Z17.0 MALIGNANT NEOPLASM OF UPPER-INNER QUADRANT OF RIGHT BREAST IN FEMALE, ESTROGEN RECEPTOR POSITIVE (HCC): ICD-10-CM

## 2023-06-02 DIAGNOSIS — E87.6 LOW SERUM POTASSIUM LEVEL: Primary | ICD-10-CM

## 2023-06-02 DIAGNOSIS — C50.211 MALIGNANT NEOPLASM OF UPPER-INNER QUADRANT OF RIGHT BREAST IN FEMALE, ESTROGEN RECEPTOR POSITIVE (HCC): ICD-10-CM

## 2023-06-02 LAB
ALBUMIN SERPL-MCNC: 3.4 G/DL (ref 3.5–5.2)
ALP SERPL-CCNC: 54 U/L (ref 35–104)
ALT SERPL-CCNC: 23 U/L (ref 9–52)
ANION GAP SERPL CALCULATED.3IONS-SCNC: 7 MMOL/L (ref 7–19)
AST SERPL-CCNC: 24 U/L (ref 14–36)
BILIRUB SERPL-MCNC: 0.5 MG/DL (ref 0.2–1.3)
BUN SERPL-MCNC: 10 MG/DL (ref 7–17)
CALCIUM SERPL-MCNC: 9.1 MG/DL (ref 8.4–10.2)
CHLORIDE SERPL-SCNC: 102 MMOL/L (ref 98–111)
CO2 SERPL-SCNC: 28 MMOL/L (ref 22–29)
CREAT SERPL-MCNC: 0.7 MG/DL (ref 0.5–1)
ERYTHROCYTE [DISTWIDTH] IN BLOOD BY AUTOMATED COUNT: 17.3 % (ref 11.7–14.4)
GLOBULIN: 2.7 G/DL
GLUCOSE SERPL-MCNC: 147 MG/DL (ref 74–106)
HCT VFR BLD AUTO: 27.4 % (ref 34.1–44.9)
HGB BLD-MCNC: 8.7 G/DL (ref 11.2–15.7)
LYMPHOCYTES # BLD: 0.39 K/UL (ref 1.18–3.74)
LYMPHOCYTES NFR BLD: 16.8 % (ref 19.3–53.1)
MCH RBC QN AUTO: 27.5 PG (ref 25.6–32.2)
MCHC RBC AUTO-ENTMCNC: 31.8 G/DL (ref 32.3–35.5)
MCV RBC AUTO: 86.7 FL (ref 79.4–94.8)
MONOCYTES # BLD: 0.36 K/UL (ref 0.24–0.82)
MONOCYTES NFR BLD: 15.5 % (ref 4.7–12.5)
NEUTROPHILS # BLD: 1.39 K/UL (ref 1.56–6.13)
NEUTS SEG NFR BLD: 59.9 % (ref 34–71.1)
PLATELET # BLD AUTO: 270 K/UL (ref 182–369)
PMV BLD AUTO: 9.2 FL (ref 7.4–10.4)
POTASSIUM SERPL-SCNC: 3.4 MMOL/L (ref 3.5–5.1)
PROT SERPL-MCNC: 6.1 G/DL (ref 6.3–8.2)
RBC # BLD AUTO: 3.16 M/UL (ref 3.93–5.22)
SODIUM SERPL-SCNC: 137 MMOL/L (ref 137–145)
WBC # BLD AUTO: 2.32 K/UL (ref 3.98–10.04)

## 2023-06-02 PROCEDURE — 36593 DECLOT VASCULAR DEVICE: CPT

## 2023-06-02 PROCEDURE — 96413 CHEMO IV INFUSION 1 HR: CPT

## 2023-06-02 PROCEDURE — 6360000002 HC RX W HCPCS: Performed by: INTERNAL MEDICINE

## 2023-06-02 PROCEDURE — 36415 COLL VENOUS BLD VENIPUNCTURE: CPT

## 2023-06-02 PROCEDURE — 2500000003 HC RX 250 WO HCPCS: Performed by: INTERNAL MEDICINE

## 2023-06-02 PROCEDURE — 2580000003 HC RX 258: Performed by: INTERNAL MEDICINE

## 2023-06-02 PROCEDURE — 96375 TX/PRO/DX INJ NEW DRUG ADDON: CPT

## 2023-06-02 PROCEDURE — 85025 COMPLETE CBC W/AUTO DIFF WBC: CPT

## 2023-06-02 PROCEDURE — 80053 COMPREHEN METABOLIC PANEL: CPT

## 2023-06-02 RX ORDER — HEPARIN SODIUM (PORCINE) LOCK FLUSH IV SOLN 100 UNIT/ML 100 UNIT/ML
500 SOLUTION INTRAVENOUS PRN
Status: CANCELLED | OUTPATIENT
Start: 2023-06-02

## 2023-06-02 RX ORDER — HEPARIN SODIUM (PORCINE) LOCK FLUSH IV SOLN 100 UNIT/ML 100 UNIT/ML
500 SOLUTION INTRAVENOUS PRN
Status: DISCONTINUED | OUTPATIENT
Start: 2023-06-02 | End: 2023-06-03 | Stop reason: HOSPADM

## 2023-06-02 RX ORDER — SODIUM CHLORIDE 9 MG/ML
5-250 INJECTION, SOLUTION INTRAVENOUS PRN
Status: DISCONTINUED | OUTPATIENT
Start: 2023-06-02 | End: 2023-06-03 | Stop reason: HOSPADM

## 2023-06-02 RX ORDER — PALONOSETRON 0.05 MG/ML
0.25 INJECTION, SOLUTION INTRAVENOUS ONCE
Status: COMPLETED | OUTPATIENT
Start: 2023-06-02 | End: 2023-06-02

## 2023-06-02 RX ORDER — DIPHENHYDRAMINE HYDROCHLORIDE 50 MG/ML
50 INJECTION INTRAMUSCULAR; INTRAVENOUS ONCE
Status: COMPLETED | OUTPATIENT
Start: 2023-06-02 | End: 2023-06-02

## 2023-06-02 RX ORDER — FAMOTIDINE 10 MG/ML
20 INJECTION, SOLUTION INTRAVENOUS ONCE
Status: COMPLETED | OUTPATIENT
Start: 2023-06-02 | End: 2023-06-02

## 2023-06-02 RX ORDER — SODIUM CHLORIDE 0.9 % (FLUSH) 0.9 %
5-40 SYRINGE (ML) INJECTION PRN
OUTPATIENT
Start: 2023-06-02

## 2023-06-02 RX ORDER — SODIUM CHLORIDE 9 MG/ML
25 INJECTION, SOLUTION INTRAVENOUS PRN
OUTPATIENT
Start: 2023-06-02

## 2023-06-02 RX ORDER — SODIUM CHLORIDE 0.9 % (FLUSH) 0.9 %
5-40 SYRINGE (ML) INJECTION PRN
Status: DISCONTINUED | OUTPATIENT
Start: 2023-06-02 | End: 2023-06-03 | Stop reason: HOSPADM

## 2023-06-02 RX ADMIN — DIPHENHYDRAMINE HYDROCHLORIDE 25 MG: 50 INJECTION, SOLUTION INTRAMUSCULAR; INTRAVENOUS at 15:19

## 2023-06-02 RX ADMIN — DEXAMETHASONE SODIUM PHOSPHATE: 10 INJECTION, SOLUTION INTRAMUSCULAR; INTRAVENOUS at 15:19

## 2023-06-02 RX ADMIN — ALTEPLASE 2 MG: 2.2 INJECTION, POWDER, LYOPHILIZED, FOR SOLUTION INTRAVENOUS at 14:33

## 2023-06-02 RX ADMIN — FAMOTIDINE 20 MG: 10 INJECTION, SOLUTION INTRAVENOUS at 15:19

## 2023-06-02 RX ADMIN — SODIUM CHLORIDE 25 ML/HR: 9 INJECTION, SOLUTION INTRAVENOUS at 15:19

## 2023-06-02 RX ADMIN — SODIUM CHLORIDE, PRESERVATIVE FREE 10 ML: 5 INJECTION INTRAVENOUS at 16:35

## 2023-06-02 RX ADMIN — PACLITAXEL 156 MG: 6 INJECTION, SOLUTION, CONCENTRATE INTRAVENOUS at 15:32

## 2023-06-02 RX ADMIN — PALONOSETRON 0.25 MG: 0.05 INJECTION, SOLUTION INTRAVENOUS at 15:19

## 2023-06-02 RX ADMIN — HEPARIN 500 UNITS: 100 SYRINGE at 16:35

## 2023-06-03 RX ORDER — POTASSIUM CHLORIDE 750 MG/1
20 CAPSULE, EXTENDED RELEASE ORAL 2 TIMES DAILY
Qty: 60 CAPSULE | Refills: 0 | Status: SHIPPED | OUTPATIENT
Start: 2023-06-03 | End: 2023-06-18

## 2023-06-08 NOTE — PROGRESS NOTES
type (ductal), grade 3. Largest contiguous area of tumor measures 16 mm. ER 99%, KS 0, HER-2 2+/equivocal, FISH-negative, Ki67 55%. MammaPrint: Luminal B/high risk. Right breast, 4:00, core biopsies: Invasive carcinoma of no special type (ductal), grade 2. Largest contiguous area of tumor measures 10 mm. ER 96%, %, HER-2 2+/equivocal, FISH-negative, Ki67 15%. MammaPrint: Luminal B/high risk. Lymph node, right axilla, fine-needle aspiration: Negative for malignant cells. Benign adipose tissue. 1/11/23 MRI bilateral breast: RIGHT breast upper inner quadrant dominant mass, consistent with biopsy proven malignancy. There is nonmass enhancement extending to the medial skin, which appears to have subtle thickening, concerning for invasion. RIGHT breast lower inner quadrant 4:00 position mass, consistent with biopsy proven malignancy. Note that the posterior aspect of the mass is 0.4 cm from the chest wall. RIGHT breast upper outer quadrant with a 0.8 cm circumscribed mass, concerning for satellite disease. Overall appearance of the RIGHT breast concerning for multicentric disease. Suspicious low-lying RIGHT axillary lymph node as above. Although this was negative by fine-needle aspiration, the imaging appearance remains suspicious. No internal mammary or LEFT axillary adenopathy. LEFT 3 cm thyroid nodule, not optimally evaluated by MRI. If not previously performed, consider nonemergent thyroid ultrasound. BI-RADS Final Assessment Category 6: Known Biopsy-Proven Malignancy Management Recommendation: Surgical excision when clinically appropriate. 1/18/22 [facility] 81 gene genetic panel: Negative for pathogenic mutations; VUS BRIP1  1/20/23 CT CHEST W CONTRAST  Multiple small subpleural nodules in the right upper lung field of indeterminate significance. Scattered borderline nonspecific intrathoracic adenopathy, as above.  Suggest a follow-up chest CT in 3 months, and/or comparison with priors, if clinically

## 2023-06-09 ENCOUNTER — HOSPITAL ENCOUNTER (OUTPATIENT)
Dept: INFUSION THERAPY | Age: 68
Discharge: HOME | End: 2023-06-09
Payer: MEDICARE

## 2023-06-09 ENCOUNTER — OFFICE VISIT (OUTPATIENT)
Dept: HEMATOLOGY | Age: 68
End: 2023-06-09
Payer: MEDICARE

## 2023-06-09 VITALS
WEIGHT: 176.2 LBS | BODY MASS INDEX: 26.7 KG/M2 | OXYGEN SATURATION: 99 % | SYSTOLIC BLOOD PRESSURE: 128 MMHG | TEMPERATURE: 98.7 F | HEART RATE: 91 BPM | HEIGHT: 68 IN | DIASTOLIC BLOOD PRESSURE: 67 MMHG | RESPIRATION RATE: 18 BRPM

## 2023-06-09 DIAGNOSIS — T45.1X5A CHEMOTHERAPY-INDUCED NEUROPATHY (HCC): ICD-10-CM

## 2023-06-09 DIAGNOSIS — T45.1X5A ANTINEOPLASTIC CHEMOTHERAPY INDUCED ANEMIA: ICD-10-CM

## 2023-06-09 DIAGNOSIS — Z17.0 MALIGNANT NEOPLASM OF LOWER-INNER QUADRANT OF RIGHT BREAST OF FEMALE, ESTROGEN RECEPTOR POSITIVE (HCC): ICD-10-CM

## 2023-06-09 DIAGNOSIS — D64.81 ANTINEOPLASTIC CHEMOTHERAPY INDUCED ANEMIA: ICD-10-CM

## 2023-06-09 DIAGNOSIS — Z51.11 CHEMOTHERAPY MANAGEMENT, ENCOUNTER FOR: ICD-10-CM

## 2023-06-09 DIAGNOSIS — C50.211 MALIGNANT NEOPLASM OF UPPER-INNER QUADRANT OF RIGHT BREAST IN FEMALE, ESTROGEN RECEPTOR POSITIVE (HCC): Primary | ICD-10-CM

## 2023-06-09 DIAGNOSIS — G62.0 CHEMOTHERAPY-INDUCED NEUROPATHY (HCC): ICD-10-CM

## 2023-06-09 DIAGNOSIS — C50.311 MALIGNANT NEOPLASM OF LOWER-INNER QUADRANT OF RIGHT BREAST OF FEMALE, ESTROGEN RECEPTOR POSITIVE (HCC): ICD-10-CM

## 2023-06-09 DIAGNOSIS — Z17.0 MALIGNANT NEOPLASM OF UPPER-INNER QUADRANT OF RIGHT BREAST IN FEMALE, ESTROGEN RECEPTOR POSITIVE (HCC): Primary | ICD-10-CM

## 2023-06-09 DIAGNOSIS — Z71.89 CARE PLAN DISCUSSED WITH PATIENT: ICD-10-CM

## 2023-06-09 DIAGNOSIS — T45.1X5D ADVERSE EFFECT OF CHEMOTHERAPY, SUBSEQUENT ENCOUNTER: ICD-10-CM

## 2023-06-09 LAB
ALBUMIN SERPL-MCNC: 3.8 G/DL (ref 3.5–5.2)
ALP SERPL-CCNC: 56 U/L (ref 35–104)
ALT SERPL-CCNC: 21 U/L (ref 9–52)
ANION GAP SERPL CALCULATED.3IONS-SCNC: 9 MMOL/L (ref 7–19)
AST SERPL-CCNC: 28 U/L (ref 14–36)
BILIRUB SERPL-MCNC: 0.6 MG/DL (ref 0.2–1.3)
BUN SERPL-MCNC: 9 MG/DL (ref 7–17)
CALCIUM SERPL-MCNC: 8.7 MG/DL (ref 8.4–10.2)
CHLORIDE SERPL-SCNC: 102 MMOL/L (ref 98–111)
CO2 SERPL-SCNC: 26 MMOL/L (ref 22–29)
CREAT SERPL-MCNC: 0.7 MG/DL (ref 0.5–1)
ERYTHROCYTE [DISTWIDTH] IN BLOOD BY AUTOMATED COUNT: 16.9 % (ref 11.7–14.4)
GLOBULIN: 2.7 G/DL
GLUCOSE SERPL-MCNC: 145 MG/DL (ref 74–106)
HCT VFR BLD AUTO: 29 % (ref 34.1–44.9)
HGB BLD-MCNC: 9.2 G/DL (ref 11.2–15.7)
LYMPHOCYTES # BLD: 0.54 K/UL (ref 1.18–3.74)
LYMPHOCYTES NFR BLD: 15.5 % (ref 19.3–53.1)
MCH RBC QN AUTO: 27.1 PG (ref 25.6–32.2)
MCHC RBC AUTO-ENTMCNC: 31.7 G/DL (ref 32.3–35.5)
MCV RBC AUTO: 85.5 FL (ref 79.4–94.8)
MONOCYTES # BLD: 0.49 K/UL (ref 0.24–0.82)
MONOCYTES NFR BLD: 14.1 % (ref 4.7–12.5)
NEUTROPHILS # BLD: 2.14 K/UL (ref 1.56–6.13)
NEUTS SEG NFR BLD: 61.6 % (ref 34–71.1)
PLATELET # BLD AUTO: 277 K/UL (ref 182–369)
PMV BLD AUTO: 9.5 FL (ref 7.4–10.4)
POTASSIUM SERPL-SCNC: 3.7 MMOL/L (ref 3.5–5.1)
PROT SERPL-MCNC: 6.5 G/DL (ref 6.3–8.2)
RBC # BLD AUTO: 3.39 M/UL (ref 3.93–5.22)
SODIUM SERPL-SCNC: 137 MMOL/L (ref 137–145)
WBC # BLD AUTO: 3.48 K/UL (ref 3.98–10.04)

## 2023-06-09 PROCEDURE — 36415 COLL VENOUS BLD VENIPUNCTURE: CPT

## 2023-06-09 PROCEDURE — 96413 CHEMO IV INFUSION 1 HR: CPT

## 2023-06-09 PROCEDURE — 85025 COMPLETE CBC W/AUTO DIFF WBC: CPT

## 2023-06-09 PROCEDURE — 80053 COMPREHEN METABOLIC PANEL: CPT

## 2023-06-09 PROCEDURE — 2580000003 HC RX 258: Performed by: INTERNAL MEDICINE

## 2023-06-09 PROCEDURE — 1123F ACP DISCUSS/DSCN MKR DOCD: CPT | Performed by: INTERNAL MEDICINE

## 2023-06-09 PROCEDURE — 96375 TX/PRO/DX INJ NEW DRUG ADDON: CPT

## 2023-06-09 PROCEDURE — 6360000002 HC RX W HCPCS: Performed by: INTERNAL MEDICINE

## 2023-06-09 PROCEDURE — 2500000003 HC RX 250 WO HCPCS: Performed by: INTERNAL MEDICINE

## 2023-06-09 PROCEDURE — 99214 OFFICE O/P EST MOD 30 MIN: CPT | Performed by: INTERNAL MEDICINE

## 2023-06-09 RX ORDER — DIPHENHYDRAMINE HYDROCHLORIDE 50 MG/ML
50 INJECTION INTRAMUSCULAR; INTRAVENOUS ONCE
Status: COMPLETED | OUTPATIENT
Start: 2023-06-09 | End: 2023-06-09

## 2023-06-09 RX ORDER — SODIUM CHLORIDE 0.9 % (FLUSH) 0.9 %
5-40 SYRINGE (ML) INJECTION PRN
Status: DISCONTINUED | OUTPATIENT
Start: 2023-06-09 | End: 2023-06-10 | Stop reason: HOSPADM

## 2023-06-09 RX ORDER — FAMOTIDINE 10 MG/ML
20 INJECTION, SOLUTION INTRAVENOUS ONCE
Status: COMPLETED | OUTPATIENT
Start: 2023-06-09 | End: 2023-06-09

## 2023-06-09 RX ORDER — LETROZOLE 2.5 MG/1
2.5 TABLET, FILM COATED ORAL DAILY
Qty: 30 TABLET | Refills: 3 | Status: SHIPPED | OUTPATIENT
Start: 2023-06-09

## 2023-06-09 RX ORDER — HEPARIN SODIUM 100 [USP'U]/ML
500 INJECTION, SOLUTION INTRAVENOUS PRN
Status: DISCONTINUED | OUTPATIENT
Start: 2023-06-09 | End: 2023-06-10 | Stop reason: HOSPADM

## 2023-06-09 RX ORDER — SODIUM CHLORIDE 9 MG/ML
5-250 INJECTION, SOLUTION INTRAVENOUS PRN
Status: DISCONTINUED | OUTPATIENT
Start: 2023-06-09 | End: 2023-06-10 | Stop reason: HOSPADM

## 2023-06-09 RX ORDER — PALONOSETRON 0.05 MG/ML
0.25 INJECTION, SOLUTION INTRAVENOUS ONCE
Status: COMPLETED | OUTPATIENT
Start: 2023-06-09 | End: 2023-06-09

## 2023-06-09 RX ADMIN — DEXAMETHASONE SODIUM PHOSPHATE: 10 INJECTION, SOLUTION INTRAMUSCULAR; INTRAVENOUS at 15:12

## 2023-06-09 RX ADMIN — FAMOTIDINE 20 MG: 10 INJECTION, SOLUTION INTRAVENOUS at 15:13

## 2023-06-09 RX ADMIN — PALONOSETRON 0.25 MG: 0.05 INJECTION, SOLUTION INTRAVENOUS at 15:13

## 2023-06-09 RX ADMIN — DIPHENHYDRAMINE HYDROCHLORIDE 50 MG: 50 INJECTION, SOLUTION INTRAMUSCULAR; INTRAVENOUS at 15:13

## 2023-06-09 RX ADMIN — PACLITAXEL 120 MG: 6 INJECTION, SOLUTION, CONCENTRATE INTRAVENOUS at 15:38

## 2023-06-09 RX ADMIN — HEPARIN SODIUM 500 UNITS: 100 INJECTION, SOLUTION INTRAVENOUS at 16:50

## 2023-06-09 RX ADMIN — SODIUM CHLORIDE, PRESERVATIVE FREE 10 ML: 5 INJECTION INTRAVENOUS at 16:50

## 2023-06-09 RX ADMIN — SODIUM CHLORIDE 75 ML/HR: 9 INJECTION, SOLUTION INTRAVENOUS at 15:09

## 2023-06-12 ENCOUNTER — TELEPHONE (OUTPATIENT)
Dept: SURGERY | Age: 68
End: 2023-06-12

## 2023-06-12 NOTE — TELEPHONE ENCOUNTER
Tried to call patient to let her know to keep the appointment with Dr. Alla Heath but we are going to cancel the one with Verner Pebbles because she can do that the same day she sees Dr. Alla Heath

## 2023-06-12 NOTE — TELEPHONE ENCOUNTER
Pt called stating she had her last chemo treatment 06/09 with Dr. Galo Wharton. Pt is asking if her current appt on 07/10/23 needs to be rescheduled or just left on that date. Please contact pt to discuss, thank you.

## 2023-07-10 ENCOUNTER — OFFICE VISIT (OUTPATIENT)
Dept: SURGERY | Age: 68
End: 2023-07-10
Payer: MEDICARE

## 2023-07-10 VITALS — SYSTOLIC BLOOD PRESSURE: 130 MMHG | DIASTOLIC BLOOD PRESSURE: 70 MMHG | HEART RATE: 72 BPM

## 2023-07-10 DIAGNOSIS — Z17.0 MALIGNANT NEOPLASM OF LOWER-INNER QUADRANT OF RIGHT BREAST OF FEMALE, ESTROGEN RECEPTOR POSITIVE (HCC): Primary | ICD-10-CM

## 2023-07-10 DIAGNOSIS — C50.311 MALIGNANT NEOPLASM OF LOWER-INNER QUADRANT OF RIGHT BREAST OF FEMALE, ESTROGEN RECEPTOR POSITIVE (HCC): Primary | ICD-10-CM

## 2023-07-10 PROCEDURE — 99213 OFFICE O/P EST LOW 20 MIN: CPT | Performed by: SURGERY

## 2023-07-10 PROCEDURE — 1123F ACP DISCUSS/DSCN MKR DOCD: CPT | Performed by: SURGERY

## 2023-07-10 NOTE — PROGRESS NOTES
HISTORY OF PRESENT ILLNESS:    Ms. Delfina Olivia presents today for exam following completion of neoadjuvant chemotherapy. She was unable to finish the last 3 due to neuropathy. She is recently status post ultrasound guided breast biopsy on the right x's 2 areas. At 1:00 revealed a 1.6  cm high grade invasive ductal carcinoma. At 4:00 revealed a 1 cm intermediate grade invasive ductal mammary carcinoma. (1:00) ER positive at 99%. IA negative. Her2 positive. Ki67 55%. (4:00) ER positive at 96%. IA positive at 100%. Her2 positive. Ki67 14%. FISH Negative for both. Ultrasound-guided FNA of right axillary node was negative for malignancy    Mammaprint is High Risk Luminal B-Type     MRI-1/11/2023       EXAM: MRI BREAST BILATERAL W WO CONTRAST -- 1/11/2023 8:45 AM   INDICATION: New breast cancer. Evaluate extent of disease. HISTORY: 75 years, Female, C50.211. Surgical pathology report   12/19/2022 \"RIGHT breast 1:00 core biopsies: Invasive carcinoma of no   special type (ductal). RIGHT breast 4:00 core biopsy: Invasive   carcinoma of no special type (ductal). \" Cytology report 12/19/2022   RIGHT axillary lymph node fine-needle aspiration negative for   malignant cells. Initial staging. Patient reports bilateral breast   tenderness. COMPARISON: 12/19/2022, 12/8/2022, 8/9/2021   FAMILY HISTORY OF BREAST CANCER: None reported   TECHNIQUE: Routine protocol MRI performed of the breasts without and   with intravenous contrast. Post processing performed on a separate   workstation. FINDINGS:   Amount of Fibroglandular Tissue: Heterogeneous fibroglandular tissue. Background Parenchymal Enhancement:  Mild.    RIGHT breast with a 4.5 x 2.8 cm (AP by transverse) bilobed or 2   closely adjacent round masses with partially spiculated margins,   heterogeneous enhancement rapid washout kinetics with associated   nonmass enhancement measuring 7.4 x 3.8 cm (AP by transverse) in the   middle third, upper inner quadrant, 1:00

## 2023-07-12 PROBLEM — C50.911 MALIGNANT NEOPLASM OF RIGHT FEMALE BREAST: Status: ACTIVE | Noted: 2023-07-12

## 2023-07-14 DIAGNOSIS — C50.311 MALIGNANT NEOPLASM OF LOWER-INNER QUADRANT OF RIGHT BREAST OF FEMALE, ESTROGEN RECEPTOR POSITIVE (HCC): Primary | ICD-10-CM

## 2023-07-14 DIAGNOSIS — Z17.0 MALIGNANT NEOPLASM OF LOWER-INNER QUADRANT OF RIGHT BREAST OF FEMALE, ESTROGEN RECEPTOR POSITIVE (HCC): Primary | ICD-10-CM

## 2023-07-21 ENCOUNTER — HOSPITAL ENCOUNTER (OUTPATIENT)
Dept: CT IMAGING | Age: 68
Discharge: HOME | End: 2023-07-21
Payer: MEDICARE

## 2023-07-21 DIAGNOSIS — Z17.0 MALIGNANT NEOPLASM OF UPPER-INNER QUADRANT OF RIGHT BREAST IN FEMALE, ESTROGEN RECEPTOR POSITIVE (HCC): ICD-10-CM

## 2023-07-21 DIAGNOSIS — R91.8 PULMONARY NODULES: ICD-10-CM

## 2023-07-21 DIAGNOSIS — C50.311 MALIGNANT NEOPLASM OF LOWER-INNER QUADRANT OF RIGHT BREAST OF FEMALE, ESTROGEN RECEPTOR POSITIVE (HCC): ICD-10-CM

## 2023-07-21 DIAGNOSIS — C50.211 MALIGNANT NEOPLASM OF UPPER-INNER QUADRANT OF RIGHT BREAST IN FEMALE, ESTROGEN RECEPTOR POSITIVE (HCC): ICD-10-CM

## 2023-07-21 DIAGNOSIS — Z17.0 MALIGNANT NEOPLASM OF LOWER-INNER QUADRANT OF RIGHT BREAST OF FEMALE, ESTROGEN RECEPTOR POSITIVE (HCC): ICD-10-CM

## 2023-07-21 PROCEDURE — 6360000004 HC RX CONTRAST MEDICATION: Performed by: INTERNAL MEDICINE

## 2023-07-21 PROCEDURE — 71260 CT THORAX DX C+: CPT

## 2023-07-21 RX ADMIN — IOPAMIDOL 60 ML: 755 INJECTION, SOLUTION INTRAVENOUS at 09:37

## 2023-07-24 ENCOUNTER — HOSPITAL ENCOUNTER (OUTPATIENT)
Dept: MRI IMAGING | Age: 68
Discharge: HOME | End: 2023-07-24
Attending: SURGERY
Payer: MEDICARE

## 2023-07-24 ENCOUNTER — HOSPITAL ENCOUNTER (OUTPATIENT)
Dept: WOMENS IMAGING | Age: 68
Discharge: HOME | End: 2023-07-24
Attending: SURGERY
Payer: MEDICARE

## 2023-07-24 DIAGNOSIS — Z17.0 MALIGNANT NEOPLASM OF LOWER-INNER QUADRANT OF RIGHT BREAST OF FEMALE, ESTROGEN RECEPTOR POSITIVE (HCC): ICD-10-CM

## 2023-07-24 DIAGNOSIS — C50.311 MALIGNANT NEOPLASM OF LOWER-INNER QUADRANT OF RIGHT BREAST OF FEMALE, ESTROGEN RECEPTOR POSITIVE (HCC): ICD-10-CM

## 2023-07-24 LAB
CREAT SERPL-MCNC: 0.5 MG/DL (ref 0.3–1.3)
PERFORMED ON: NORMAL
POC SAMPLE TYPE: NORMAL

## 2023-07-24 PROCEDURE — 6360000004 HC RX CONTRAST MEDICATION: Performed by: SURGERY

## 2023-07-24 PROCEDURE — A9577 INJ MULTIHANCE: HCPCS | Performed by: SURGERY

## 2023-07-24 PROCEDURE — C8908 MRI W/O FOL W/CONT, BREAST,: HCPCS

## 2023-07-24 PROCEDURE — 76642 ULTRASOUND BREAST LIMITED: CPT | Performed by: GENERAL PRACTICE

## 2023-07-24 PROCEDURE — 82565 ASSAY OF CREATININE: CPT

## 2023-07-24 PROCEDURE — 77065 DX MAMMO INCL CAD UNI: CPT | Performed by: GENERAL PRACTICE

## 2023-07-24 PROCEDURE — G0279 TOMOSYNTHESIS, MAMMO: HCPCS

## 2023-07-24 PROCEDURE — 76642 ULTRASOUND BREAST LIMITED: CPT

## 2023-07-24 RX ADMIN — GADOBENATE DIMEGLUMINE 16 ML: 529 INJECTION, SOLUTION INTRAVENOUS at 08:06

## 2023-07-31 ENCOUNTER — OFFICE VISIT (OUTPATIENT)
Dept: SURGERY | Age: 68
End: 2023-07-31
Payer: MEDICARE

## 2023-07-31 VITALS — SYSTOLIC BLOOD PRESSURE: 124 MMHG | DIASTOLIC BLOOD PRESSURE: 70 MMHG | HEART RATE: 76 BPM

## 2023-07-31 DIAGNOSIS — Z17.0 MALIGNANT NEOPLASM OF LOWER-INNER QUADRANT OF RIGHT BREAST OF FEMALE, ESTROGEN RECEPTOR POSITIVE (HCC): Primary | ICD-10-CM

## 2023-07-31 DIAGNOSIS — C50.311 MALIGNANT NEOPLASM OF LOWER-INNER QUADRANT OF RIGHT BREAST OF FEMALE, ESTROGEN RECEPTOR POSITIVE (HCC): Primary | ICD-10-CM

## 2023-07-31 DIAGNOSIS — Z17.0 MALIGNANT NEOPLASM OF UPPER-INNER QUADRANT OF RIGHT BREAST IN FEMALE, ESTROGEN RECEPTOR POSITIVE (HCC): ICD-10-CM

## 2023-07-31 DIAGNOSIS — C50.211 MALIGNANT NEOPLASM OF UPPER-INNER QUADRANT OF RIGHT BREAST IN FEMALE, ESTROGEN RECEPTOR POSITIVE (HCC): ICD-10-CM

## 2023-07-31 PROCEDURE — 1123F ACP DISCUSS/DSCN MKR DOCD: CPT | Performed by: SURGERY

## 2023-07-31 PROCEDURE — 99214 OFFICE O/P EST MOD 30 MIN: CPT | Performed by: SURGERY

## 2023-08-07 ENCOUNTER — TELEPHONE (OUTPATIENT)
Dept: HEMATOLOGY | Age: 68
End: 2023-08-07

## 2023-08-07 ENCOUNTER — TELEPHONE (OUTPATIENT)
Dept: SURGERY | Age: 68
End: 2023-08-07

## 2023-08-07 NOTE — TELEPHONE ENCOUNTER
8/7/2023 Need letter faxed to get out of federal jury duty due to having surgery on 8/15/2023.     Fax # [phone] # [phone]  amanda

## 2023-08-09 ENCOUNTER — OFFICE VISIT (OUTPATIENT)
Dept: OTOLARYNGOLOGY | Facility: CLINIC | Age: 68
End: 2023-08-09
Payer: MEDICARE

## 2023-08-09 VITALS
BODY MASS INDEX: 26.98 KG/M2 | DIASTOLIC BLOOD PRESSURE: 71 MMHG | HEART RATE: 88 BPM | HEIGHT: 68 IN | TEMPERATURE: 98.2 F | WEIGHT: 178 LBS | SYSTOLIC BLOOD PRESSURE: 134 MMHG

## 2023-08-09 DIAGNOSIS — E04.1 THYROID NODULE: Primary | ICD-10-CM

## 2023-08-09 NOTE — PROGRESS NOTES
YOB: 1955  Location: [facility]  Location Address: [address] [place] [address], [facility] [address], [address] [place] [address]  Location Phone: [phone]    Chief Complaint   Patient presents with    Thyroid Problem       History of Present Illness  Karen Tipton is a 68 y.o. female.  Karen Tipton is here for follow up of ENT complaints. The patient has had problems with thyroid nodules   Patient has thyroid nodules that were found incidentally on ct chest during work up for breast cancer   She denies sore throat, hoarseness or dysphagia   Largest nodule underwent fine needle aspiration 2023 which revealed benign pathology     She has a history of breast cancer and is scheduled for bilateral mastectomy 8/15/2023 by Dr. Watt     Fine Needle Aspiration (2023 09:23)    US Thyroid (2023 14:31)   Past Medical History:   Diagnosis Date    Colon polyp     Hypertension        Past Surgical History:   Procedure Laterality Date    BREAST BIOPSY      COLONOSCOPY  06/10/2014    3 polyps destroyed    COLONOSCOPY N/A 2019    Procedure: COLONOSCOPY WITH ANESTHESIA;  Surgeon: Ismael Ramachandran MD;  Location: [facility] ENDOSCOPY;  Service: Gastroenterology       Outpatient Medications Marked as Taking for the 23 encounter (Office Visit) with Raegan Cheema APRN   Medication Sig Dispense Refill    letrozole (FEMARA) 2.5 MG tablet Take 1 tablet by mouth Daily.      lisinopril-hydrochlorothiazide (PRINZIDE,ZESTORETIC) 10-12.5 MG per tablet Take 1 tablet by mouth once daily 90 tablet 0    montelukast (SINGULAIR) 10 MG tablet        Current Facility-Administered Medications for the 23 encounter (Office Visit) with Raegan Cheema APRN   Medication Dose Route Frequency Provider Last Rate Last Admin    lidocaine (XYLOCAINE) 1 % injection 5 mL  5 mL Infiltration Once Michael Brewer MD           Patient has no known allergies.    Family History   Problem Relation Age of Onset    Stroke Mother     Dementia Mother      Cancer Father         protate    Colon cancer Paternal Grandfather        Social History     Socioeconomic History    Marital status:    Tobacco Use    Smoking status: Never    Smokeless tobacco: Never   Vaping Use    Vaping Use: Never used   Substance and Sexual Activity    Alcohol use: Yes     Comment: rare    Drug use: No    Sexual activity: Defer       Review of Systems   Constitutional:  Positive for fatigue.   HENT:  Negative for sore throat, trouble swallowing and voice change.      Vitals:    08/09/23 1507   BP: 134/71   Pulse: 88   Temp: 98.2 øF (36.8 øC)       Body mass index is 27.06 kg/mý.    Objective     Physical Exam  Vitals reviewed.   Constitutional:       Appearance: Normal appearance. She is normal weight.   HENT:      Head: Normocephalic.      Right Ear: Tympanic membrane, ear canal and external ear normal.      Left Ear: Tympanic membrane, ear canal and external ear normal.      Nose: Nose normal.      Mouth/Throat:      Lips: Pink.      Mouth: Mucous membranes are moist.      Pharynx: Uvula midline.   Neck:      Comments: Bilateral palpable thyroid nodules     Musculoskeletal:      Cervical back: Full passive range of motion without pain.   Neurological:      Mental Status: She is alert.       Assessment & Plan   Diagnoses and all orders for this visit:    1. Thyroid nodule (Primary)  -     US Thyroid; Future      * Surgery not found *  Orders Placed This Encounter   Procedures    US Thyroid     Standing Status:   Future     Standing Expiration Date:   8/9/2024     Order Specific Question:   Reason for Exam:     Answer:   Thyroid disease     Return in about 6 months (around 2/9/2024) for Recheck.     Will continue to follow up with serial ultrasound   Call for new/worsening concerns     Patient Instructions   The patient has a thyroid nodule, which is relatively small, and studies do not suggest a malignancy. I have recommended observation with follow-up with me for repeat ultrasound.  I explained the pathology of thyroid nodules including the risks of cancer. The options of surgery were discussed, but the patient wants to pursue an observational course for now, which is reasonable. The patient wishes to continue to defer surgery at this time.

## 2023-08-10 ENCOUNTER — HOSPITAL ENCOUNTER (OUTPATIENT)
Dept: PREADMISSION TESTING | Age: 68
Discharge: HOME | End: 2023-08-14
Payer: MEDICARE

## 2023-08-10 VITALS — WEIGHT: 176 LBS | BODY MASS INDEX: 26.76 KG/M2

## 2023-08-10 PROCEDURE — 93005 ELECTROCARDIOGRAM TRACING: CPT | Performed by: SURGERY

## 2023-08-10 RX ORDER — CELECOXIB 200 MG/1
200 CAPSULE ORAL ONCE
Status: CANCELLED | OUTPATIENT
Start: 2023-08-15

## 2023-08-10 RX ORDER — ACETAMINOPHEN 325 MG/1
975 TABLET ORAL ONCE
Status: CANCELLED | OUTPATIENT
Start: 2023-08-15

## 2023-08-10 RX ORDER — GABAPENTIN 300 MG/1
300 CAPSULE ORAL ONCE
Status: CANCELLED | OUTPATIENT
Start: 2023-08-15

## 2023-08-10 NOTE — DISCHARGE INSTRUCTIONS
surgery. You will be scheduled to arrive at the hospital 2 hours before your surgery, or follow your surgeon's instructions. Dress comfortably. Wear loose clothing that will be easy to remove and comfortable for your trip home. You may wear eyeglasses or contacts but bring your cases with you as they must be remove before your surgery. Hearing aids and dentures will need to be removed before your surgery. Do not wear any jewelry, including body jewelry. All jewelry will need to be removed prior to your surgery. Do not wear fingernail polish or make-up. It is best not to bring any valuables with you. If you are to stay in the hospital overnight, bring your robe, slippers and personal toiletries that you may need. POSTOPERATIVE GUIDELINES AFTER RECEIVING ANESTHESIA    If you are to go home after your surgery, you will need a responsible adult to drive you home. You will not be able to take public transportation after your discharge from the Operative Care Unit unless you are accompanied by a        responsible adult. On returning home, be sure to follow your physician's orders regarding diet, activity and medications. Remember, surgery with general anesthesia or sedation may leave you sleepy, very tired and with a decreased appetite for 12 to 24 hours. If you develop any post-surgical complications or problems, call your surgeon or [facility] Emergency Department ([phone]). [address] Patients-Revised 6-    Visitors for surgery patients are essential for the patient's emotional well-being and care       post operatively. 2.   Visitor Expectations and Limitations  3. One visitor allowed with patients in the preop/postop rooms. 4.  A second visitor may sit in the waiting area. 5.  No children under 13 allowed in the pre-post op areas unless they are the patient.     6.  Two people may be with an underage

## 2023-08-11 LAB
EKG P AXIS: 31 DEGREES
EKG P-R INTERVAL: 194 MS
EKG Q-T INTERVAL: 390 MS
EKG QRS DURATION: 78 MS
EKG QTC CALCULATION (BAZETT): 416 MS
EKG T AXIS: 56 DEGREES

## 2023-08-11 PROCEDURE — 93010 ELECTROCARDIOGRAM REPORT: CPT | Performed by: INTERNAL MEDICINE

## 2023-08-14 ENCOUNTER — HOSPITAL ENCOUNTER (OUTPATIENT)
Dept: WOMENS IMAGING | Age: 68
Discharge: HOME | End: 2023-08-14
Attending: SURGERY
Payer: MEDICARE

## 2023-08-14 DIAGNOSIS — C50.311 MALIGNANT NEOPLASM OF LOWER-INNER QUADRANT OF RIGHT BREAST OF FEMALE, ESTROGEN RECEPTOR POSITIVE (HCC): ICD-10-CM

## 2023-08-14 DIAGNOSIS — Z17.0 MALIGNANT NEOPLASM OF LOWER-INNER QUADRANT OF RIGHT BREAST OF FEMALE, ESTROGEN RECEPTOR POSITIVE (HCC): ICD-10-CM

## 2023-08-14 PROCEDURE — 76642 ULTRASOUND BREAST LIMITED: CPT

## 2023-08-15 ENCOUNTER — ANESTHESIA (OUTPATIENT)
Dept: OPERATING ROOM | Age: 68
End: 2023-08-15
Payer: MEDICARE

## 2023-08-15 ENCOUNTER — ANESTHESIA EVENT (OUTPATIENT)
Dept: OPERATING ROOM | Age: 68
End: 2023-08-15
Payer: MEDICARE

## 2023-08-15 ENCOUNTER — HOSPITAL ENCOUNTER (OUTPATIENT)
Age: 68
Setting detail: OUTPATIENT SURGERY
Discharge: HOME | End: 2023-08-15
Attending: SURGERY | Admitting: SURGERY
Payer: MEDICARE

## 2023-08-15 ENCOUNTER — HOSPITAL ENCOUNTER (OUTPATIENT)
Dept: NUCLEAR MEDICINE | Age: 68
Discharge: HOME | End: 2023-08-17
Attending: SURGERY
Payer: MEDICARE

## 2023-08-15 VITALS
HEART RATE: 74 BPM | TEMPERATURE: 99 F | DIASTOLIC BLOOD PRESSURE: 71 MMHG | RESPIRATION RATE: 18 BRPM | SYSTOLIC BLOOD PRESSURE: 151 MMHG | WEIGHT: 175 LBS | BODY MASS INDEX: 26.52 KG/M2 | HEIGHT: 68 IN | OXYGEN SATURATION: 97 %

## 2023-08-15 DIAGNOSIS — C50.311 MALIGNANT NEOPLASM OF LOWER-INNER QUADRANT OF RIGHT FEMALE BREAST, UNSPECIFIED ESTROGEN RECEPTOR STATUS (HCC): ICD-10-CM

## 2023-08-15 DIAGNOSIS — C50.311 MALIGNANT NEOPLASM OF LOWER-INNER QUADRANT OF RIGHT BREAST OF FEMALE, ESTROGEN RECEPTOR POSITIVE (HCC): ICD-10-CM

## 2023-08-15 DIAGNOSIS — Z17.0 MALIGNANT NEOPLASM OF LOWER-INNER QUADRANT OF RIGHT BREAST OF FEMALE, ESTROGEN RECEPTOR POSITIVE (HCC): Primary | ICD-10-CM

## 2023-08-15 DIAGNOSIS — Z17.0 MALIGNANT NEOPLASM OF LOWER-INNER QUADRANT OF RIGHT BREAST OF FEMALE, ESTROGEN RECEPTOR POSITIVE (HCC): ICD-10-CM

## 2023-08-15 DIAGNOSIS — C50.311 MALIGNANT NEOPLASM OF LOWER-INNER QUADRANT OF RIGHT BREAST OF FEMALE, ESTROGEN RECEPTOR POSITIVE (HCC): Primary | ICD-10-CM

## 2023-08-15 PROCEDURE — 2580000003 HC RX 258: Performed by: SURGERY

## 2023-08-15 PROCEDURE — 2500000003 HC RX 250 WO HCPCS: Performed by: SURGERY

## 2023-08-15 PROCEDURE — 6370000000 HC RX 637 (ALT 250 FOR IP): Performed by: SURGERY

## 2023-08-15 PROCEDURE — 7100000001 HC PACU RECOVERY - ADDTL 15 MIN: Performed by: SURGERY

## 2023-08-15 PROCEDURE — 3600000015 HC SURGERY LEVEL 5 ADDTL 15MIN: Performed by: SURGERY

## 2023-08-15 PROCEDURE — 38525 BIOPSY/REMOVAL LYMPH NODES: CPT | Performed by: SURGERY

## 2023-08-15 PROCEDURE — 7100000010 HC PHASE II RECOVERY - FIRST 15 MIN: Performed by: SURGERY

## 2023-08-15 PROCEDURE — 2709999900 HC NON-CHARGEABLE SUPPLY: Performed by: SURGERY

## 2023-08-15 PROCEDURE — 88307 TISSUE EXAM BY PATHOLOGIST: CPT

## 2023-08-15 PROCEDURE — 2500000003 HC RX 250 WO HCPCS: Performed by: ANESTHESIOLOGY

## 2023-08-15 PROCEDURE — 19303 MAST SIMPLE COMPLETE: CPT | Performed by: SURGERY

## 2023-08-15 PROCEDURE — 88305 TISSUE EXAM BY PATHOLOGIST: CPT

## 2023-08-15 PROCEDURE — A4217 STERILE WATER/SALINE, 500 ML: HCPCS | Performed by: SURGERY

## 2023-08-15 PROCEDURE — 6360000002 HC RX W HCPCS: Performed by: ANESTHESIOLOGY

## 2023-08-15 PROCEDURE — 2580000003 HC RX 258: Performed by: ANESTHESIOLOGY

## 2023-08-15 PROCEDURE — 88329 PATH CONSLTJ DRG SURG: CPT

## 2023-08-15 PROCEDURE — 3700000000 HC ANESTHESIA ATTENDED CARE: Performed by: SURGERY

## 2023-08-15 PROCEDURE — 2500000003 HC RX 250 WO HCPCS: Performed by: NURSE ANESTHETIST, CERTIFIED REGISTERED

## 2023-08-15 PROCEDURE — 7100000011 HC PHASE II RECOVERY - ADDTL 15 MIN: Performed by: SURGERY

## 2023-08-15 PROCEDURE — 88309 TISSUE EXAM BY PATHOLOGIST: CPT

## 2023-08-15 PROCEDURE — 38792 RA TRACER ID OF SENTINL NODE: CPT

## 2023-08-15 PROCEDURE — A4216 STERILE WATER/SALINE, 10 ML: HCPCS | Performed by: ANESTHESIOLOGY

## 2023-08-15 PROCEDURE — A9520 TC99 TILMANOCEPT DIAG 0.5MCI: HCPCS | Performed by: SURGERY

## 2023-08-15 PROCEDURE — 6360000002 HC RX W HCPCS: Performed by: SURGERY

## 2023-08-15 PROCEDURE — 3430000000 HC RX DIAGNOSTIC RADIOPHARMACEUTICAL: Performed by: SURGERY

## 2023-08-15 PROCEDURE — 38900 IO MAP OF SENT LYMPH NODE: CPT | Performed by: SURGERY

## 2023-08-15 PROCEDURE — 38525 BIOPSY/REMOVAL LYMPH NODES: CPT | Performed by: PHYSICIAN ASSISTANT

## 2023-08-15 PROCEDURE — 3700000001 HC ADD 15 MINUTES (ANESTHESIA): Performed by: SURGERY

## 2023-08-15 PROCEDURE — 7100000000 HC PACU RECOVERY - FIRST 15 MIN: Performed by: SURGERY

## 2023-08-15 PROCEDURE — 6360000002 HC RX W HCPCS: Performed by: NURSE ANESTHETIST, CERTIFIED REGISTERED

## 2023-08-15 PROCEDURE — 19303 MAST SIMPLE COMPLETE: CPT | Performed by: PHYSICIAN ASSISTANT

## 2023-08-15 PROCEDURE — 3600000005 HC SURGERY LEVEL 5 BASE: Performed by: SURGERY

## 2023-08-15 PROCEDURE — 2720000010 HC SURG SUPPLY STERILE: Performed by: SURGERY

## 2023-08-15 RX ORDER — SODIUM CHLORIDE, SODIUM LACTATE, POTASSIUM CHLORIDE, CALCIUM CHLORIDE 600; 310; 30; 20 MG/100ML; MG/100ML; MG/100ML; MG/100ML
INJECTION, SOLUTION INTRAVENOUS CONTINUOUS
Status: DISCONTINUED | OUTPATIENT
Start: 2023-08-15 | End: 2023-08-15 | Stop reason: HOSPADM

## 2023-08-15 RX ORDER — GABAPENTIN 300 MG/1
300 CAPSULE ORAL ONCE
Status: COMPLETED | OUTPATIENT
Start: 2023-08-15 | End: 2023-08-15

## 2023-08-15 RX ORDER — HYDROCODONE BITARTRATE AND ACETAMINOPHEN 5; 325 MG/1; MG/1
1 TABLET ORAL EVERY 6 HOURS PRN
Qty: 12 TABLET | Refills: 0 | Status: SHIPPED | OUTPATIENT
Start: 2023-08-15 | End: 2023-08-24

## 2023-08-15 RX ORDER — ACETAMINOPHEN 325 MG/1
975 TABLET ORAL ONCE
Status: COMPLETED | OUTPATIENT
Start: 2023-08-15 | End: 2023-08-15

## 2023-08-15 RX ORDER — SODIUM CHLORIDE 0.9 % (FLUSH) 0.9 %
5-40 SYRINGE (ML) INJECTION EVERY 12 HOURS SCHEDULED
Status: DISCONTINUED | OUTPATIENT
Start: 2023-08-15 | End: 2023-08-15 | Stop reason: HOSPADM

## 2023-08-15 RX ORDER — SODIUM CHLORIDE 9 MG/ML
INJECTION, SOLUTION INTRAVENOUS PRN
Status: DISCONTINUED | OUTPATIENT
Start: 2023-08-15 | End: 2023-08-15 | Stop reason: HOSPADM

## 2023-08-15 RX ORDER — HYDROMORPHONE HYDROCHLORIDE 1 MG/ML
0.5 INJECTION, SOLUTION INTRAMUSCULAR; INTRAVENOUS; SUBCUTANEOUS EVERY 5 MIN PRN
Status: DISCONTINUED | OUTPATIENT
Start: 2023-08-15 | End: 2023-08-15 | Stop reason: HOSPADM

## 2023-08-15 RX ORDER — CEFAZOLIN SODIUM 1 G/3ML
INJECTION, POWDER, FOR SOLUTION INTRAMUSCULAR; INTRAVENOUS PRN
Status: DISCONTINUED | OUTPATIENT
Start: 2023-08-15 | End: 2023-08-15 | Stop reason: SDUPTHER

## 2023-08-15 RX ORDER — DIPHENHYDRAMINE HYDROCHLORIDE 50 MG/ML
INJECTION INTRAMUSCULAR; INTRAVENOUS PRN
Status: DISCONTINUED | OUTPATIENT
Start: 2023-08-15 | End: 2023-08-15 | Stop reason: SDUPTHER

## 2023-08-15 RX ORDER — SODIUM CHLORIDE 0.9 % (FLUSH) 0.9 %
5-40 SYRINGE (ML) INJECTION PRN
Status: DISCONTINUED | OUTPATIENT
Start: 2023-08-15 | End: 2023-08-15 | Stop reason: HOSPADM

## 2023-08-15 RX ORDER — ONDANSETRON 2 MG/ML
4 INJECTION INTRAMUSCULAR; INTRAVENOUS
Status: DISCONTINUED | OUTPATIENT
Start: 2023-08-15 | End: 2023-08-15 | Stop reason: HOSPADM

## 2023-08-15 RX ORDER — PROPOFOL 10 MG/ML
INJECTION, EMULSION INTRAVENOUS PRN
Status: DISCONTINUED | OUTPATIENT
Start: 2023-08-15 | End: 2023-08-15 | Stop reason: SDUPTHER

## 2023-08-15 RX ORDER — CELECOXIB 200 MG/1
200 CAPSULE ORAL ONCE
Status: COMPLETED | OUTPATIENT
Start: 2023-08-15 | End: 2023-08-15

## 2023-08-15 RX ORDER — LIDOCAINE HYDROCHLORIDE 10 MG/ML
INJECTION, SOLUTION EPIDURAL; INFILTRATION; INTRACAUDAL; PERINEURAL PRN
Status: DISCONTINUED | OUTPATIENT
Start: 2023-08-15 | End: 2023-08-15 | Stop reason: SDUPTHER

## 2023-08-15 RX ORDER — GLYCOPYRROLATE 0.2 MG/ML
INJECTION INTRAMUSCULAR; INTRAVENOUS PRN
Status: DISCONTINUED | OUTPATIENT
Start: 2023-08-15 | End: 2023-08-15 | Stop reason: SDUPTHER

## 2023-08-15 RX ORDER — ONDANSETRON 2 MG/ML
INJECTION INTRAMUSCULAR; INTRAVENOUS PRN
Status: DISCONTINUED | OUTPATIENT
Start: 2023-08-15 | End: 2023-08-15 | Stop reason: SDUPTHER

## 2023-08-15 RX ORDER — HYDROMORPHONE HYDROCHLORIDE 1 MG/ML
0.25 INJECTION, SOLUTION INTRAMUSCULAR; INTRAVENOUS; SUBCUTANEOUS EVERY 5 MIN PRN
Status: DISCONTINUED | OUTPATIENT
Start: 2023-08-15 | End: 2023-08-15 | Stop reason: HOSPADM

## 2023-08-15 RX ORDER — DEXAMETHASONE SODIUM PHOSPHATE 4 MG/ML
4 INJECTION, SOLUTION INTRA-ARTICULAR; INTRALESIONAL; INTRAMUSCULAR; INTRAVENOUS; SOFT TISSUE ONCE
Status: COMPLETED | OUTPATIENT
Start: 2023-08-15 | End: 2023-08-15

## 2023-08-15 RX ORDER — FENTANYL CITRATE 50 UG/ML
INJECTION, SOLUTION INTRAMUSCULAR; INTRAVENOUS PRN
Status: DISCONTINUED | OUTPATIENT
Start: 2023-08-15 | End: 2023-08-15 | Stop reason: SDUPTHER

## 2023-08-15 RX ORDER — PROPOFOL 10 MG/ML
INJECTION, EMULSION INTRAVENOUS CONTINUOUS PRN
Status: DISCONTINUED | OUTPATIENT
Start: 2023-08-15 | End: 2023-08-15 | Stop reason: SDUPTHER

## 2023-08-15 RX ADMIN — FENTANYL CITRATE 25 MCG: 0.05 INJECTION, SOLUTION INTRAMUSCULAR; INTRAVENOUS at 10:45

## 2023-08-15 RX ADMIN — FENTANYL CITRATE 25 MCG: 0.05 INJECTION, SOLUTION INTRAMUSCULAR; INTRAVENOUS at 10:39

## 2023-08-15 RX ADMIN — SODIUM CHLORIDE, SODIUM LACTATE, POTASSIUM CHLORIDE, AND CALCIUM CHLORIDE: 600; 310; 30; 20 INJECTION, SOLUTION INTRAVENOUS at 10:12

## 2023-08-15 RX ADMIN — SODIUM CHLORIDE, SODIUM LACTATE, POTASSIUM CHLORIDE, AND CALCIUM CHLORIDE: 600; 310; 30; 20 INJECTION, SOLUTION INTRAVENOUS at 07:10

## 2023-08-15 RX ADMIN — ONDANSETRON 4 MG: 2 INJECTION INTRAMUSCULAR; INTRAVENOUS at 10:48

## 2023-08-15 RX ADMIN — PROPOFOL 30 MG: 10 INJECTION, EMULSION INTRAVENOUS at 08:36

## 2023-08-15 RX ADMIN — FENTANYL CITRATE 25 MCG: 0.05 INJECTION, SOLUTION INTRAMUSCULAR; INTRAVENOUS at 09:29

## 2023-08-15 RX ADMIN — PROPOFOL 20 MG: 10 INJECTION, EMULSION INTRAVENOUS at 09:29

## 2023-08-15 RX ADMIN — HYDROCORTISONE SODIUM SUCCINATE 100 MG: 250 INJECTION, POWDER, FOR SOLUTION INTRAMUSCULAR; INTRAVENOUS at 08:30

## 2023-08-15 RX ADMIN — SODIUM CHLORIDE, SODIUM LACTATE, POTASSIUM CHLORIDE, AND CALCIUM CHLORIDE: 600; 310; 30; 20 INJECTION, SOLUTION INTRAVENOUS at 06:57

## 2023-08-15 RX ADMIN — GABAPENTIN 300 MG: 300 CAPSULE ORAL at 07:08

## 2023-08-15 RX ADMIN — PROPOFOL 50 MG: 10 INJECTION, EMULSION INTRAVENOUS at 08:41

## 2023-08-15 RX ADMIN — DEXAMETHASONE SODIUM PHOSPHATE 4 MG: 4 INJECTION, SOLUTION INTRAMUSCULAR; INTRAVENOUS at 07:08

## 2023-08-15 RX ADMIN — CEFAZOLIN SODIUM 2 G: 1 INJECTION, POWDER, FOR SOLUTION INTRAMUSCULAR; INTRAVENOUS at 08:57

## 2023-08-15 RX ADMIN — PROPOFOL 70 MG: 10 INJECTION, EMULSION INTRAVENOUS at 08:26

## 2023-08-15 RX ADMIN — GLYCOPYRROLATE 0.1 MG: 0.2 INJECTION, SOLUTION INTRAMUSCULAR; INTRAVENOUS at 08:30

## 2023-08-15 RX ADMIN — PROPOFOL 30 MG: 10 INJECTION, EMULSION INTRAVENOUS at 09:34

## 2023-08-15 RX ADMIN — ACETAMINOPHEN 975 MG: 325 TABLET ORAL at 07:08

## 2023-08-15 RX ADMIN — TILMANOCEPT 500 MICRO CURIE: KIT at 08:40

## 2023-08-15 RX ADMIN — PROPOFOL 40 MG: 10 INJECTION, EMULSION INTRAVENOUS at 08:46

## 2023-08-15 RX ADMIN — HYDROMORPHONE HYDROCHLORIDE 0.25 MG: 1 INJECTION, SOLUTION INTRAMUSCULAR; INTRAVENOUS; SUBCUTANEOUS at 11:17

## 2023-08-15 RX ADMIN — CELECOXIB 200 MG: 200 CAPSULE ORAL at 07:08

## 2023-08-15 RX ADMIN — DIPHENHYDRAMINE HYDROCHLORIDE 25 MG: 50 INJECTION, SOLUTION INTRAMUSCULAR; INTRAVENOUS at 08:30

## 2023-08-15 RX ADMIN — FENTANYL CITRATE 25 MCG: 0.05 INJECTION, SOLUTION INTRAMUSCULAR; INTRAVENOUS at 09:18

## 2023-08-15 RX ADMIN — LIDOCAINE HYDROCHLORIDE 50 MG: 10 INJECTION, SOLUTION EPIDURAL; INFILTRATION; INTRACAUDAL; PERINEURAL at 08:26

## 2023-08-15 RX ADMIN — FAMOTIDINE 20 MG: 10 INJECTION INTRAVENOUS at 07:09

## 2023-08-15 RX ADMIN — PROPOFOL 120 MCG/KG/MIN: 10 INJECTION, EMULSION INTRAVENOUS at 08:30

## 2023-08-15 ASSESSMENT — LIFESTYLE VARIABLES: SMOKING_STATUS: 0

## 2023-08-15 ASSESSMENT — PAIN SCALES - GENERAL
PAINLEVEL_OUTOF10: 4
PAINLEVEL_OUTOF10: 3
PAINLEVEL_OUTOF10: 3
PAINLEVEL_OUTOF10: 2

## 2023-08-15 ASSESSMENT — PAIN DESCRIPTION - ORIENTATION
ORIENTATION: RIGHT;LEFT
ORIENTATION: LEFT;RIGHT

## 2023-08-15 ASSESSMENT — PAIN DESCRIPTION - LOCATION
LOCATION: BREAST

## 2023-08-15 ASSESSMENT — PAIN DESCRIPTION - DESCRIPTORS: DESCRIPTORS: ACHING

## 2023-08-15 NOTE — DISCHARGE INSTRUCTIONS
1. You may remove the dressing or Bandaid after 2 days. Leave steri strips on for 1-2 weeks. 2. Wearing a supportive bra, such as a sports bra can significantly reduce postoperative pain and swelling. 3. You may bathe or shower as desired 48 hours after surgery. Always just pat the incision dry, never rub. 4. If a small area of the incision becomes red and inflamed, clean the wound with peroxide 3-4 times daily and apply Neosporin. Call the office with a temperature of 101.1 or greater, [phone].  5. Firmness in the area of the incision will last 1-2 months. 6. You may drive 2 days after surgery. 7. If you become constipated you may use one of the following over-the-counter products, Fleets enema, 1 bottle Magnesium Citrate,Ducolax tablets or suppositories, If no results after using one or more of these call the office. 8. You may ordinarily return to work 2-3 days after surgery. 9.Call the office with any questions during regular hours mon-fri 9:00-4:00 . In case of after hours emergencies the answering service will take your call. 10. Follow up 1 month, call to schedule if it was not scheduled by hospital.   11. Call 1 hour before appointment to see if office is running on time. 12. Call the office for the pathology report in 3 days . 1. You may remove the dressing or Bandaid after 2 days. Leave steri strips on for 1-2 weeks. 2. Wearing a supportive bra, such as a sports bra can significantly reduce postoperative pain and swelling. 3. You may bathe or shower as desired 48 hours after surgery. Always just pat the incision dry, never rub. 4. If a small area of the incision becomes red and inflamed, clean the wound with peroxide 3-4 times daily and apply Neosporin. Call the office with a temperature of 101.1 or greater, [phone].  5. Firmness in the area of the incision will last 1-2 months. 6. You may drive 2 days after surgery.   7. If you become constipated you may use one of the following

## 2023-08-15 NOTE — OP NOTE
Dictated      Operation performed with curative intent: Yes  Tracer(s) used to identify sentinel nodes in the upfront surgery (nonneoadjuvant) setting (select all that apply) : Radioactive tracer  Tracer(s) used to identify sentinel nodes in the neoadjuvant setting (select all that apply): Not Applicable  All nodes (colored or noncolored) present at the end of a dye-filled lymphatic channel were removed: Not Applicable  All significantly radioactive nodes were removed:  Yes  All palpably suspicious nodes were removed: Not Applicable  Biopsy-proven positive nodes marked with clips prior to chemotherapy were identified and removed: Not Applicable

## 2023-08-15 NOTE — BRIEF OP NOTE
Brief Postoperative Note      DATE OF PROCEDURE: 8/15/2023     SURGEON: Shefali Gates MD    PREOPERATIVE DIAGNOSIS:  Malignant neoplasm of lower-inner quadrant of right female breast, unspecified estrogen receptor status ([address]) [C50.311]    POSTOPERATIVE DIAGNOSIS: Same     OPERATION: Procedure(s):  BILATERAL MASTECTOMY WITH PEC BLOCK & RIGHT SNB    ANESTHESIA: Monitor Anesthesia Care    ESTIMATED BLOOD LOSS: Minimal    COMPLICATIONS: None. SPECIMENS:   ID Type Source Tests Collected by Time Destination   A : left breast tissue Tissue Breast SURGICAL PATHOLOGY Shefali Gates MD 8/15/2023 3841    B : right breast tissue-white stitches at tumor site, black stitch at axilla Tissue Breast MD José 8/15/2023 1031    C : right sentinel node Tissue Breast SURGICAL PATHOLOGY Shefali Gates MD 8/15/2023 6678    D : right breast advancement flap Tissue Breast SURGICAL PATHOLOGY Shefali Gates MD 8/15/2023 9622    E : left breast advancement flap Tissue Breast SURGICAL PATHOLOGY Shefali Gates MD 8/15/2023 2652        DRAINS: 4 JPs    The patient tolerated the procedure well.     Electronically signed by Shefali Gates MD  on 8/15/2023 at 11:20 AM

## 2023-08-15 NOTE — PROGRESS NOTES
CLINICAL PHARMACY NOTE: MEDS TO BEDS    Total # of Prescriptions Filled: 2   The following medications were delivered to the patient:  Discharge Medication List as of 8/15/2023 12:10 PM        START taking these medications    Details   HYDROcodone-acetaminophen (NORCO) 5-325 MG per tablet Take 1 tablet by mouth every 6 hours as needed for Pain. Max Daily Amount: 4 tablets, Disp-12 tablet, R-0Normal         Narcan      Additional Documentation:     Delivered Rx's to op-care. Gave Rx's to patients spouse at bedside. Paid with cash.

## 2023-08-15 NOTE — ANESTHESIA PRE PROCEDURE
Neuro/Psych:      (-) seizures, TIA and CVA           GI/Hepatic/Renal:        (-) GERD, liver disease and no renal disease       Endo/Other:    (+) malignancy/cancer (Breast). (-) diabetes mellitus, hypothyroidism, hyperthyroidism               Abdominal:             Vascular:     - DVT. Other Findings:           Anesthesia Plan      MAC     ASA 3     (Preop famotidine, dexamethasone  Discussed conversion to GETA if necessary)  Induction: intravenous. MIPS: Postoperative opioids intended and Prophylactic antiemetics administered. Anesthetic plan and risks discussed with patient and spouse. Use of blood products discussed with patient and spouse whom consented to blood products.                      Adam Flaherty MD   8/15/2023

## 2023-08-16 NOTE — OP NOTE
[facility] [address], [address] [place] [address]                                OPERATIVE REPORT    PATIENT NAME: Nathan Camarillo                     :        1955  MED REC NO:   [id number]                              ROOM:  ACCOUNT NO:   [de-identified]                           ADMIT DATE: 08/15/2023  PROVIDER:     Nguyễn Merritt MD    DATE OF PROCEDURE:  08/15/2023    PREOPERATIVE DIAGNOSIS:  Right breast cancer x2, status post neoadjuvant  chemotherapy. POSTOPERATIVE DIAGNOSIS:  Right breast cancer x2, status post  neoadjuvant chemotherapy. PROCEDURE:  1. Injection of radionuclide. 2.  Lymphatic mapping. 3.  Bilateral pectoral block - 12 levels. 4.  Bilateral simple mastectomy. 5.  Right sentinel lymph node biopsy. SURGEON:  Dr. Nguyễn Merritt. ASSISTANT:  River Santos PA-C. He was present for all portions of  the case including all critical portions as well as closure. ANESTHESIA:  Pec block with MAC. INDICATIONS:  The patient is a very nice 58-year-old lady who presented  with two separate right breast cancers. There was one in the medial  aspect and one in the far lateral, so two completely opposite quadrants. She received neoadjuvant chemotherapy with complete disappearance of the  lateral lesion; the medial lesion was still present. We discussed the  risks and benefits of mastectomy versus mastectomy with reconstruction. She did not want to pursue reconstruction at this time. She also wished  to do bilateral mastectomies. We discussed the risks and benefits of  this at some length with her and her family. All understood and were  agreeable. DESCRIPTION OF PROCEDURE:  The patient was brought to the operating  room, adequately sedated and then we injected the right periareolar  dermis with 500 microcuries of Lymphoseek. We then prepped the skin  with chlorhexidine and began our pectoral block.   We used a solution

## 2023-08-18 NOTE — PROGRESS NOTES
HISTORY OF PRESENT ILLNESS:    Ms. Narayan Mancera presents today for a one week post op following bilateral simple mastectomy with right sentinel lymph node biopsy on 8/15/2023    She was unable to finish the last 3 due to neuropathy. She is recently status post ultrasound guided breast biopsy on the right x's 2 areas. At 1:00 revealed a 1.6  cm high grade invasive ductal carcinoma. At 4:00 revealed a 1 cm intermediate grade invasive ductal mammary carcinoma. (1:00) ER positive at 99%. [place] negative. Her2 positive. Ki67 55%. (4:00) ER positive at 96%. [place] positive at 100%. Her2 positive. Ki67 14%. FISH Negative for both. Ultrasound-guided FNA of right axillary node was negative for malignancy    Mammaprint is High Risk Luminal B-Type     MRI-7/25/2023  FINDINGS:  Amount of Fibroglandular Tissue: Heterogeneous fibroglandular tissue. Background Parenchymal Enhancement:  Mild. Right breast: On prior exam there are 3 areas of abnormal kinetics  described in the right breast. The lesion in the upper inner quadrant  of the right breast does not demonstrate abnormal kinetics. The lesion in the upper outer right breast does not demonstrate  abnormal kinetics. The lesion in the medial proximal third of the right breast  demonstrates persistent abnormal kinetics. This is in the right inner  quadrant of the left breast 5:00 position 1.7 x 0.9 x 1.6 cm. Curve  peak is 80%. This is a biopsy-proven malignancy. Left breast: Heterogeneous fibroglandular tissue is present in the  left breast with no focal regions of architectural distortion or  abnormal kinetics. Nodes: A suspicious node in the right axilla is again noted. Appearance is concerning for willy disease and similar to prior exam  of January 12, 2023  Partially visualized chest/abdomen: Grossly unremarkable  Bones: No suspicious bony findings  IMPRESSION:  1.  Persistent area of abnormal kinetics in the inferior medial right  breast. This correlates with the

## 2023-08-23 ENCOUNTER — OFFICE VISIT (OUTPATIENT)
Dept: SURGERY | Age: 68
End: 2023-08-23

## 2023-08-23 VITALS — DIASTOLIC BLOOD PRESSURE: 84 MMHG | HEART RATE: 80 BPM | SYSTOLIC BLOOD PRESSURE: 138 MMHG

## 2023-08-23 DIAGNOSIS — C50.211 MALIGNANT NEOPLASM OF UPPER-INNER QUADRANT OF RIGHT BREAST IN FEMALE, ESTROGEN RECEPTOR POSITIVE (HCC): Primary | ICD-10-CM

## 2023-08-23 DIAGNOSIS — Z17.0 MALIGNANT NEOPLASM OF UPPER-INNER QUADRANT OF RIGHT BREAST IN FEMALE, ESTROGEN RECEPTOR POSITIVE (HCC): Primary | ICD-10-CM

## 2023-08-23 DIAGNOSIS — Z17.0 MALIGNANT NEOPLASM OF LOWER-INNER QUADRANT OF RIGHT BREAST OF FEMALE, ESTROGEN RECEPTOR POSITIVE (HCC): ICD-10-CM

## 2023-08-23 DIAGNOSIS — Z90.13 STATUS POST BILATERAL MASTECTOMY: ICD-10-CM

## 2023-08-23 DIAGNOSIS — C50.311 MALIGNANT NEOPLASM OF LOWER-INNER QUADRANT OF RIGHT BREAST OF FEMALE, ESTROGEN RECEPTOR POSITIVE (HCC): ICD-10-CM

## 2023-08-23 PROCEDURE — 99024 POSTOP FOLLOW-UP VISIT: CPT | Performed by: SURGERY

## 2023-08-24 ENCOUNTER — OFFICE VISIT (OUTPATIENT)
Dept: HEMATOLOGY | Age: 68
End: 2023-08-24

## 2023-08-24 ENCOUNTER — HOSPITAL ENCOUNTER (OUTPATIENT)
Dept: INFUSION THERAPY | Age: 68
Discharge: HOME | End: 2023-08-24
Payer: MEDICARE

## 2023-08-24 VITALS
OXYGEN SATURATION: 96 % | SYSTOLIC BLOOD PRESSURE: 120 MMHG | DIASTOLIC BLOOD PRESSURE: 72 MMHG | HEART RATE: 80 BPM | WEIGHT: 173.8 LBS | BODY MASS INDEX: 26.34 KG/M2 | HEIGHT: 68 IN

## 2023-08-24 DIAGNOSIS — C50.211 MALIGNANT NEOPLASM OF UPPER-INNER QUADRANT OF RIGHT BREAST IN FEMALE, ESTROGEN RECEPTOR POSITIVE (HCC): ICD-10-CM

## 2023-08-24 DIAGNOSIS — Z17.0 MALIGNANT NEOPLASM OF UPPER-INNER QUADRANT OF RIGHT BREAST IN FEMALE, ESTROGEN RECEPTOR POSITIVE (HCC): ICD-10-CM

## 2023-08-24 DIAGNOSIS — Z71.89 CARE PLAN DISCUSSED WITH PATIENT: Primary | ICD-10-CM

## 2023-08-24 DIAGNOSIS — Z08 ENCOUNTER FOR FOLLOW-UP SURVEILLANCE OF BREAST CANCER: ICD-10-CM

## 2023-08-24 DIAGNOSIS — Z51.81 ENCOUNTER FOR MONITORING AROMATASE INHIBITOR THERAPY: ICD-10-CM

## 2023-08-24 DIAGNOSIS — Z79.811 ENCOUNTER FOR MONITORING AROMATASE INHIBITOR THERAPY: ICD-10-CM

## 2023-08-24 DIAGNOSIS — C50.911 INVASIVE DUCTAL CARCINOMA OF BREAST, FEMALE, RIGHT (HCC): Primary | ICD-10-CM

## 2023-08-24 DIAGNOSIS — Z91.89 AT RISK FOR BONE DENSITY LOSS: ICD-10-CM

## 2023-08-24 DIAGNOSIS — Z85.3 ENCOUNTER FOR FOLLOW-UP SURVEILLANCE OF BREAST CANCER: ICD-10-CM

## 2023-08-24 PROBLEM — Z90.13 STATUS POST BILATERAL MASTECTOMY: Status: ACTIVE | Noted: 2023-08-24

## 2023-08-24 LAB
ERYTHROCYTE [DISTWIDTH] IN BLOOD BY AUTOMATED COUNT: 14.4 % (ref 11.7–14.4)
HCT VFR BLD AUTO: 35.5 % (ref 34.1–44.9)
HGB BLD-MCNC: 11.4 G/DL (ref 11.2–15.7)
LYMPHOCYTES # BLD: 1.37 K/UL (ref 1.18–3.74)
LYMPHOCYTES NFR BLD: 20.2 % (ref 19.3–53.1)
MCH RBC QN AUTO: 23.5 PG (ref 25.6–32.2)
MCHC RBC AUTO-ENTMCNC: 32.1 G/DL (ref 32.3–35.5)
MCV RBC AUTO: 73.2 FL (ref 79.4–94.8)
MONOCYTES # BLD: 0.57 K/UL (ref 0.24–0.82)
MONOCYTES NFR BLD: 8.4 % (ref 4.7–12.5)
NEUTROPHILS # BLD: 4.47 K/UL (ref 1.56–6.13)
NEUTS SEG NFR BLD: 65.9 % (ref 34–71.1)
PLATELET # BLD AUTO: 259 K/UL (ref 182–369)
PMV BLD AUTO: 9.6 FL (ref 7.4–10.4)
RBC # BLD AUTO: 4.85 M/UL (ref 3.93–5.22)
WBC # BLD AUTO: 6.78 K/UL (ref 3.98–10.04)

## 2023-08-24 PROCEDURE — 36415 COLL VENOUS BLD VENIPUNCTURE: CPT

## 2023-08-24 PROCEDURE — 96523 IRRIG DRUG DELIVERY DEVICE: CPT

## 2023-08-24 PROCEDURE — 85025 COMPLETE CBC W/AUTO DIFF WBC: CPT

## 2023-08-24 PROCEDURE — 99211 OFF/OP EST MAY X REQ PHY/QHP: CPT

## 2023-08-24 RX ORDER — SODIUM CHLORIDE 0.9 % (FLUSH) 0.9 %
5-40 SYRINGE (ML) INJECTION PRN
Status: DISCONTINUED | OUTPATIENT
Start: 2023-08-24 | End: 2023-08-24

## 2023-08-24 RX ORDER — HEPARIN 100 UNIT/ML
500 SYRINGE INTRAVENOUS PRN
Status: DISCONTINUED | OUTPATIENT
Start: 2023-08-24 | End: 2023-08-24

## 2023-08-24 RX ORDER — HEPARIN 100 UNIT/ML
500 SYRINGE INTRAVENOUS PRN
Status: CANCELLED | OUTPATIENT
Start: 2023-08-24

## 2023-08-24 RX ORDER — SODIUM CHLORIDE 0.9 % (FLUSH) 0.9 %
5-40 SYRINGE (ML) INJECTION PRN
Status: CANCELLED | OUTPATIENT
Start: 2023-08-24

## 2023-08-24 RX ORDER — SODIUM CHLORIDE 9 MG/ML
25 INJECTION, SOLUTION INTRAVENOUS PRN
Status: CANCELLED | OUTPATIENT
Start: 2023-08-24

## 2023-08-25 ENCOUNTER — TELEPHONE (OUTPATIENT)
Dept: OTHER | Age: 68
End: 2023-08-25

## 2023-08-25 NOTE — PROGRESS NOTES
HISTORY OF PRESENT ILLNESS:    Ms. Fiona Hunter presents today for a one week fluid check following bilateral simple mastectomy with right sentinel lymph node biopsy on 8/15/2023    She was unable to finish the last 3 due to neuropathy. She is recently status post ultrasound guided breast biopsy on the right x's 2 areas. At 1:00 revealed a 1.6  cm high grade invasive ductal carcinoma. At 4:00 revealed a 1 cm intermediate grade invasive ductal mammary carcinoma. (1:00) ER positive at 99%. NV negative. Her2 positive. Ki67 55%. (4:00) ER positive at 96%. NV positive at 100%. Her2 positive. Ki67 14%. FISH Negative for both. Her repeat markers showed ER positive at 80%. NV positive at 3%. HER2 is equivocal 2+ and Ki-67 is 2%. Ultrasound-guided FNA of right axillary node was negative for malignancy    Mammaprint is High Risk Luminal B-Type     MRI-7/25/2023  FINDINGS:  Amount of Fibroglandular Tissue: Heterogeneous fibroglandular tissue. Background Parenchymal Enhancement:  Mild. Right breast: On prior exam there are 3 areas of abnormal kinetics  described in the right breast. The lesion in the upper inner quadrant  of the right breast does not demonstrate abnormal kinetics. The lesion in the upper outer right breast does not demonstrate  abnormal kinetics. The lesion in the medial proximal third of the right breast  demonstrates persistent abnormal kinetics. This is in the right inner  quadrant of the left breast 5:00 position 1.7 x 0.9 x 1.6 cm. Curve  peak is 80%. This is a biopsy-proven malignancy. Left breast: Heterogeneous fibroglandular tissue is present in the  left breast with no focal regions of architectural distortion or  abnormal kinetics. Nodes: A suspicious node in the right axilla is again noted.   Appearance is concerning for willy disease and similar to prior exam  of January 12, 2023  Partially visualized chest/abdomen: Grossly unremarkable  Bones: No suspicious bony

## 2023-08-25 NOTE — TELEPHONE ENCOUNTER
Pt states she is doing well and no questions. Just waiting on appointment with radiation oncology. She will be making some trips and excited about her results. Suturegard Intro: Intraoperative tissue expansion was performed, utilizing the SUTUREGARD device, in order to reduce wound tension.

## 2023-08-30 ENCOUNTER — OFFICE VISIT (OUTPATIENT)
Dept: SURGERY | Age: 68
End: 2023-08-30

## 2023-08-30 VITALS — HEART RATE: 76 BPM | SYSTOLIC BLOOD PRESSURE: 124 MMHG | DIASTOLIC BLOOD PRESSURE: 70 MMHG

## 2023-08-30 DIAGNOSIS — Z17.0 MALIGNANT NEOPLASM OF LOWER-INNER QUADRANT OF RIGHT BREAST OF FEMALE, ESTROGEN RECEPTOR POSITIVE (HCC): ICD-10-CM

## 2023-08-30 DIAGNOSIS — Z90.13 STATUS POST BILATERAL MASTECTOMY: Primary | ICD-10-CM

## 2023-08-30 DIAGNOSIS — Z17.0 MALIGNANT NEOPLASM OF UPPER-INNER QUADRANT OF RIGHT BREAST IN FEMALE, ESTROGEN RECEPTOR POSITIVE (HCC): ICD-10-CM

## 2023-08-30 DIAGNOSIS — C50.211 MALIGNANT NEOPLASM OF UPPER-INNER QUADRANT OF RIGHT BREAST IN FEMALE, ESTROGEN RECEPTOR POSITIVE (HCC): ICD-10-CM

## 2023-08-30 DIAGNOSIS — C50.311 MALIGNANT NEOPLASM OF LOWER-INNER QUADRANT OF RIGHT BREAST OF FEMALE, ESTROGEN RECEPTOR POSITIVE (HCC): ICD-10-CM

## 2023-08-30 PROCEDURE — 99024 POSTOP FOLLOW-UP VISIT: CPT | Performed by: SURGERY

## 2023-09-07 ENCOUNTER — HOSPITAL ENCOUNTER (OUTPATIENT)
Dept: RADIATION ONCOLOGY | Facility: HOSPITAL | Age: 68
Setting detail: RADIATION/ONCOLOGY SERIES
End: 2023-09-07
Payer: MEDICARE

## 2023-09-10 PROBLEM — Z90.13 S/P BILATERAL MASTECTOMY: Status: ACTIVE | Noted: 2023-09-10

## 2023-09-10 PROBLEM — Z78.9 NON-SMOKER: Status: ACTIVE | Noted: 2023-09-10

## 2023-09-10 PROBLEM — Z98.890 S/P LYMPH NODE BIOPSY: Status: ACTIVE | Noted: 2023-09-10

## 2023-09-11 NOTE — PROGRESS NOTES
[facility] PC[facility]ney[facility]REGINALDO Tabor MD      Alex Yadav APRN  _______________________________________________  [facility]  Department of Radiation Oncology  [address] [place] [id number]  Office: [phone]  Fax: [phone]    DATE:  09/13/2023  PATIENT: Karen Tipton 1955                         MEDICAL RECORD #:  [id number]                                                       REASON FOR VISIT  Chief Complaint   Patient presents with    Breast Cancer      is a very pleasant female that has been referred to our office to discuss radiotherapy considerations for breast carcinoma. Reports fatigue. Denies activity change, appetite change, unexpected weight change, nasuea/vomiting, diarrhea, light-headedness, weakness, and headaches. She follows  and .     History of Present Illness:    01/14/2019 - Right breast, 11:00, core biopsies:   Benign fibroadenomatoid changes with associated microcalcifications.     12/08/2022 - Bilateral Diagnostic Mammogram/Right Breast Ultrasound due to palpable right breast mass:  Right breast, BI-RADS 5, palpable mass is highly suggestive of a breast malignancy. Recommendation is for percutaneous core needle biopsy.   Immediately adjacent second satellite mass is also highly suggestive of a breast malignancy. Recommendation is for appropriate action.   Additional third mass in a separate quadrant at 4:00 is highly suggestive of a breast malignancy. Recommendation is for percutaneous core needle biopsy.   At least 2 abnormal axillary lymph nodes are suspicious for metastatic involvement. Recommendation is for fine needle aspiration versus nonvacuum percutaneous biopsy per performing physician preference.   Given the constellation of findings, there is high suggestion of multicentric malignancy. Given the multiple findings recommendation would be for a biopsy of the main palpable mass as well as an additional  biopsy of the mass at 4:00 in a separate quadrant to prove multicentric malignancy.   Additional recommendation as described above would be for testing of the abnormal axillary lymph nodes. This was all discussed in detail with the patient. She will be seen by Annamaria immediately following this appointment and Annamaria is aware.   Overall assessment, BI-RADS 5, highly suggestive of malignancy with recommendation for biopsies.    12/19/2022 - Right breast biopsy:  Right breast, 1:00, core biopsies:   Invasive carcinoma of no special type (ductal), grade 3.   Largest contiguous area of tumor measures 16 mm.   ER +, HI -, Her/2 2+ equivocal, FISH negative  Luminal B/high risk  Right breast, 4:00, core biopsies:   Invasive carcinoma of no special type (ductal), grade 2.   Largest contiguous area of tumor measures 10 mm.   ER +, HI +, Her 2/ 2+ equivocal, FISH negative  Luminal B/high risk    01/11/2023 - MRI Bilateral Breasts with and without contrast:  RIGHT breast upper inner quadrant dominant mass, consistent with biopsy proven malignancy. There is nonmass enhancement extending to the medial skin, which appears to have subtle thickening, concerning for invasion.   RIGHT breast lower inner quadrant 4:00 position mass, consistent with biopsy proven malignancy. Note that the posterior aspect of the mass is 0.4 cm from the chest wall.   RIGHT breast upper outer quadrant with a 0.8 cm circumscribed mass, concerning for satellite disease.   Overall appearance of the RIGHT breast concerning for multicentric disease.   Suspicious low-lying RIGHT axillary lymph node as above. Although this was negative by fine-needle aspiration, the imaging appearance remains suspicious.   No internal mammary or LEFT axillary adenopathy.   LEFT 3 cm thyroid nodule, not optimally evaluated by MRI. If not previously performed, consider nonemergent thyroid ultrasound.   BI-RADS Final Assessment Category 6: Known Biopsy-Proven Malignancy   Management  Recommendation: Surgical excision when clinically appropriate.     01/18/2023 - Genetic Testing:  Negative for 81 out of 81 genes.   A variant of uncertain significance (VUS) was detected in the BRIP1 gene(s). A VUS means that a change in the DNA was detected, but there is not enough information to determine whether or not the change increases the risk of cancer. The [employer] (ACMG) states that VUS should NOT be used in clinical decision making.   Please see below for additional findings.   VUS:   Gene: BRIP1 , Variant: c.205G>A (p.G69R) Note: A heterozygous variant of uncertain significance (VUS) was detected in the BRIP1 gene as tabulated above.     01/20/2023 - CT Abdomen/Pelvis with contrast:  Small sliding hiatal hernia is seen. Mild thickening versus an element of underdistension is seen in the cecum and ascending colon. This could reflect an element of colitis due to infectious / inflammatory process.   No metastatic lesions are seen. No ascites.     01/20/2023 - CT Chest with contrast:  Multiple small subpleural nodules in the right upper lung field of indeterminate significance. Scattered borderline nonspecific intrathoracic adenopathy, as above. Suggest a follow-up chest CT in 3 months, and/or comparison with priors, if clinically appropriate.   Lobulated mass lesions with punctate calcifications in the right breast. These are consistent with known malignant neoplasm of the right breast. Consider correlation with dedicated ultrasound and mammography, if clinically appropriate.   A heterogenous hypodense nodule in the left lobe of the thyroid gland, and cannot exclude a small right thyroid nodule, as above, for which correlation with ultrasound is recommended.     01/20/2023 - Bone Scan:  There is no evidence of osseous metastatic disease.     01/25/2023 - Consult with :  Essentially, clinical T3 N1 M0 disease. I recommend neoadjuvant chemotherapy.    Treatment consent signed for dose dense Adriamycin Cyclophosphamide + GCSF every 2 weeks x 4 cycles followed by weekly Taxol x 12 weeks     02/15/2023 - 06/09/2023 - Chemotherapy course:  Adriamycin/Cyclophosphamide every 2 weeks x 4, followed by weekly Taxol x 9  Chemo stopped due to development of neuropathy. 3 remaining cycles of Taxol omitted.    05/09/2023 - Right Breast Ultrasound:  Right breast, BI-RADS 5, highly suggestive of malignancy involving the non biopsied mass at 1:00 4 cm from the nipple. This shows interim decrease in size most consistent with response to neoadjuvant chemotherapy. As this has not been biopsied, the finding remains highly suggestive of a malignancy. Recommend biopsy, clip placement, or management based on surgical plan.   Biopsy-proven malignant mass at 1:00 5 cm from the nipple has decreased in size, estimated to have decreased by maybe 50%.   Biopsy-proven malignant mass at 4:00 11 cm from the nipple has only slightly decreased in size from approximately 13 mm to 10 mm.   The right axilla was not imaged with today's ultrasound, please refer to prior prebiopsy ultrasound and MRI and fine-needle aspiration performed on December 19, 2022. Appropriate management of the axilla should be based on current surgical management standards for staging of the axilla with an ipsilateral breast malignancy.   Recommend appropriate action based on plans for clinical and surgical management as described above.   Overall assessment BI-RADS 5, highly suggestive of malignancy.     06/09/2023 -  Hormonal Therapy:  Letrozole    07/21/2023 - CT Chest with contrast:  Decreased size of right breast mass.   Stable pulmonary nodules.   No evidence of new or worsening disease.     07/24/2023 - MRI Bilateral Breasts with and without contrast:  Persistent area of abnormal kinetics in the inferior medial right breast. This correlates with the biopsy-proven malignancy.   The upper outer and upper inner right  breast known biopsy-proven malignancies  do not demonstrate abnormal kinetics at this time.   Suspicious right axillary lymph node as above unchanged.   BI-RADS Final Assessment Category 6: Known Biopsy-Proven Malignancy   Management Recommendation: Surgical excision when clinically appropriate.     07/24/2023 - Right Diagnostic Mammogram/Ultrasound:  Known biopsy-proven breast cancer. BI-RADS 6.   RECOMMENDATION: Surgical follow-up.     08/15/2023 -  Bilateral mastectomies and right sentinel lymph node biopsy per :  Breast, right mastectomy:   Infiltrating ductal carcinoma, no special type, grade 1.   Residual invasive carcinoma measures 1.1 cm in greatest dimension.   Invasive carcinoma is located 0.6 cm from the nearest deep surgical excision margin.   Low-grade ductal carcinoma in situ is identified in conjunction with the invasive lesion.   In situ carcinoma is located greater than 1.0 cm from the nearest deep surgical excision margin.   Sections of nipple, negative for evidence of malignancy.   Sections of skin, negative for evidence of malignancy.   Changes consistent with fibrocystic mastopathy as well as a small benign intraductal papilloma are identified in the nonneoplastic breast parenchyma.   Breast, left mastectomy:   Benign breast parenchyma with changes consistent with fibrocystic mastopathy.   Sections of nipple, negative for evidence of malignancy.   Sections of skin, negative for evidence of malignancy.   Lymph node, right sentinel lymph node biopsy:   1 lymph node, negative for evidence of malignancy.   Breast, excision of right breast advancement flap:   Benign skin and subcutaneous tissue.   Breast, excision of left breast advancement flap:   Benign skin and subcutaneous tissue.   AJCC STAGE: ypT1c, (sn)pN0, pMx     08/24/2023 - Appointment with :  Clinical T3 N0 M0->ypT1N0. Stage IIIa invasive ductal carcinoma, 1:00 right breast, Grade 3, ER 99%, MO 0, HER-2  2+/equivocal,  FISH-negative, Ki67 55%, MammaPrint: Luminal B/high risk, Dec 2022. Invasive ductal carcinoma, 4:00 right breast, Grade 2, ER 96%, %, HER-2 2+/equivocal, FISH-negative, Ki67 15%, MammaPrint: Luminal B/high risk, Dec 2022  -Essentially, locally advanced disease.  -S/p neoadjuvant chemo: ddAC x 4 followed by Taxol x 9 weekly cycles  -8/15/23 Bilateral breast mastectomy: AJCC STAGE: ypT1c, (sn)pN0, M0  Plan:  -Continue adjuvant Letrozole x 5 years, at least consider breast cancer index at year 5  -Referral Dr Stephane Tabor opinion adjuvant RT  PLAN:  RTC with MD 4 month for surveillance follow-up  CBC today  Recommend Bone Density to monitor for endocrine therapy  Refer to  at [facility] for radiation therapy  Femara 2.5 mg PO daily to be started in 2 weeks-script sent  Continue follow-up with YANNICK Leavitt/[facility] ENT f  Continue follow-up with /[facility]  Ok to remove port  Return in about 4 months (around 12/24/2023) for CBC, Appointment with Dr. Barba.  Sched Bone Density  Refer to  at [facility] for radiation therapy    08/30/2023 - Appointment with :  PLAN: Follow-up in 2 to 3 weeks for physical exam     History obtained from  PATIENT and CHART    PAST MEDICAL HISTORY  Past Medical History:   Diagnosis Date    Colon polyp     Hypertension       PAST SURGICAL HISTORY  Past Surgical History:   Procedure Laterality Date    BREAST BIOPSY      COLONOSCOPY  06/10/2014    3 polyps destroyed    COLONOSCOPY N/A 8/5/2019    Procedure: COLONOSCOPY WITH ANESTHESIA;  Surgeon: Ismael Ramachandran MD;  Location: [facility] ENDOSCOPY;  Service: Gastroenterology      FAMILY HISTORY  family history includes Cancer in her father; Colon cancer in her paternal grandfather; Dementia in her mother; Stroke in her mother.    SOCIAL HISTORY  Social History     Tobacco Use    Smoking status: Never    Smokeless tobacco: Never   Vaping Use    Vaping Use: Never used   Substance Use Topics     "Alcohol use: Yes     Comment: rare    Drug use: No     ALLERGIES  Patient has no known allergies.     MEDICATIONS    Current Outpatient Medications:     letrozole (FEMARA) 2.5 MG tablet, Take 1 tablet by mouth Daily., Disp: , Rfl:     lisinopril-hydrochlorothiazide (PRINZIDE,ZESTORETIC) 10-12.5 MG per tablet, Take 1 tablet by mouth once daily, Disp: 90 tablet, Rfl: 0    montelukast (SINGULAIR) 10 MG tablet, Take 1 tablet by mouth Every Night., Disp: , Rfl:     Current Facility-Administered Medications:     lidocaine (XYLOCAINE) 1 % injection 5 mL, 5 mL, Infiltration, Once, Michael Brewer MD    The following portions of the patient's history were reviewed and updated as appropriate: allergies, current medications, past family history, past medical history, past social history, past surgical history and problem list.    Current outpatient and discharge medications have been reconciled for the patient.  Reviewed by: Stephane Tabor III, MD    REVIEW OF SYSTEMS  Review of Systems   Constitutional:  Positive for fatigue. Negative for activity change and unexpected weight change.   HENT: Negative.     Respiratory: Negative.  Negative for cough and shortness of breath.    Cardiovascular: Negative.  Negative for chest pain.   Gastrointestinal: Negative.    Genitourinary: Negative.    Musculoskeletal: Negative.    Skin: Negative.    Neurological: Negative.  Negative for dizziness and weakness.   Hematological: Negative.  Negative for adenopathy.   Psychiatric/Behavioral: Negative.     All other systems reviewed and are negative.    PHYSICAL EXAM  VITAL SIGNS:   Vitals:    09/13/23 0936   BP: 142/72   Pulse: 88   Resp: 16   SpO2: 100%   Weight: 79.8 kg (176 lb)   Height: 172.7 cm (68\")   PainSc: 0-No pain       Physical Exam  Vitals reviewed.   Constitutional:       Appearance: Normal appearance.   HENT:      Head: Normocephalic.   Cardiovascular:      Rate and Rhythm: Normal rate and regular rhythm.      Pulses: Normal " pulses.      Heart sounds: Normal heart sounds.   Pulmonary:      Effort: Pulmonary effort is normal. No respiratory distress.      Breath sounds: Normal breath sounds.   Abdominal:      General: Bowel sounds are normal.   Musculoskeletal:         General: Normal range of motion.      Cervical back: Normal range of motion and neck supple.   Skin:     General: Skin is warm.      Capillary Refill: Capillary refill takes less than 2 seconds.   Neurological:      General: No focal deficit present.      Mental Status: She is alert and oriented to person, place, and time.   Psychiatric:         Mood and Affect: Mood normal.         Behavior: Behavior normal.     ECOG: (0) Fully active, able to carry on all predisease performance without restriction    Clinical Quality Measures  -Pain Documented by Standardized Tool, FPS Karen Tipton reports a pain score of 0. Given her pain assessment as noted, treatment options were discussed and the following options were decided upon as a follow-up plan to address the patient's pain:  No pain, no plan given .    Body Mass Index Screening and Follow-Up Plan  Body mass index is 26.76 kg/m².     Tobacco Use: Screening and Cessation Intervention  Social History    Tobacco Use      Smoking status: Never      Smokeless tobacco: Never    Advanced Care Planning   Advance Care Planning  ACP discussion was held with the patient during this visit. Patient has an advance directive in EMR which is still valid.      ASSESSMENT AND PLAN  1. Malignant neoplasm of right female breast, unspecified estrogen receptor status, unspecified site of breast    2. S/P bilateral mastectomy    3. S/P lymph node biopsy    4. Non-smoker      No orders of the defined types were placed in this encounter.    RECOMMENDATIONS:   Karen Tipton was diagnosed with Stage IIIA (T3, N1, cM0) ER+ IDC of right breast.    We discussed the patients goals and plans of care. I have seen, examined and reviewed this patient's  medication list, appropriate labs and imaging studies as well as other physician notes.  I have extensively reviewed the risks, benefits and alternatives.  Risks of radiation therapy includes but is not limited to skin redness, dry, tender, or itchy sunburn-type skin irritation of the targeted area, which may range from mild to intense, skin, breast heaviness, redness, bruised appearance or discoloration of the skin, cumulative general fatigue and the risk of progression of disease in spite of therapy with either local or systemic failure.     We have discussed the role of radiation therapy with this diagnosis as well as the indications and rationale of adjuvant radiation therapy according to the NCCN Guidelines. She has verbalized understanding of our conversation and agrees to the treatment recommendations for postoperative radiation therapy to the right chest wall and involved lymph nodes. following Simple Mastectomy. I anticipate a dose of 5040 cGy with 1000 cGy boost to the tumor bed for a total of 6040 cGy/33 fractions.. We will simulate treatment fields to begin the treatment planning, final dose to be determined.    Discussed post surgery and radiation risk for lymphedema, referral will be placed to lymphedema clinic for initial evaluation/education, she will continue ongoing management per primary care physician and other specialists. Thank you for allowing me to assist in her care.     Patient Instructions   1) Plan on 33 daily treatments (Monday-Friday). Side effects may include blistering and peeling of the skin, fatigue.  2) We will call you when to start in about 2 weeks.    Time Spent: I spent 58 minutes caring for Karen on this date of service. This time includes time spent by me in the following activities: preparing for the visit, reviewing tests, obtaining and/or reviewing a separately obtained history, performing a medically appropriate examination and/or evaluation, counseling and educating the  patient/family/caregiver, ordering medications, tests, or procedures, referring and communicating with other health care professionals, documenting information in the medical record, independently interpreting results and communicating that information with the patient/family/caregiver, and care coordination.   Stephane Tabor III, MD  09/13/2023

## 2023-09-12 ENCOUNTER — HOSPITAL ENCOUNTER (OUTPATIENT)
Dept: WOMENS IMAGING | Age: 68
Discharge: HOME | End: 2023-09-12
Attending: INTERNAL MEDICINE
Payer: MEDICARE

## 2023-09-12 DIAGNOSIS — Z79.811 ENCOUNTER FOR MONITORING AROMATASE INHIBITOR THERAPY: ICD-10-CM

## 2023-09-12 DIAGNOSIS — Z17.0 MALIGNANT NEOPLASM OF UPPER-INNER QUADRANT OF RIGHT BREAST IN FEMALE, ESTROGEN RECEPTOR POSITIVE (HCC): ICD-10-CM

## 2023-09-12 DIAGNOSIS — C50.211 MALIGNANT NEOPLASM OF UPPER-INNER QUADRANT OF RIGHT BREAST IN FEMALE, ESTROGEN RECEPTOR POSITIVE (HCC): ICD-10-CM

## 2023-09-12 DIAGNOSIS — Z51.81 ENCOUNTER FOR MONITORING AROMATASE INHIBITOR THERAPY: ICD-10-CM

## 2023-09-12 PROCEDURE — 77080 DXA BONE DENSITY AXIAL: CPT

## 2023-09-13 ENCOUNTER — CONSULT (OUTPATIENT)
Dept: RADIATION ONCOLOGY | Facility: HOSPITAL | Age: 68
End: 2023-09-13
Payer: MEDICARE

## 2023-09-13 VITALS
HEART RATE: 88 BPM | DIASTOLIC BLOOD PRESSURE: 72 MMHG | BODY MASS INDEX: 26.67 KG/M2 | OXYGEN SATURATION: 100 % | RESPIRATION RATE: 16 BRPM | HEIGHT: 68 IN | SYSTOLIC BLOOD PRESSURE: 142 MMHG | WEIGHT: 176 LBS

## 2023-09-13 DIAGNOSIS — Z78.9 NON-SMOKER: ICD-10-CM

## 2023-09-13 DIAGNOSIS — Z98.890 S/P LYMPH NODE BIOPSY: ICD-10-CM

## 2023-09-13 DIAGNOSIS — C50.911 MALIGNANT NEOPLASM OF RIGHT FEMALE BREAST, UNSPECIFIED ESTROGEN RECEPTOR STATUS, UNSPECIFIED SITE OF BREAST: Primary | ICD-10-CM

## 2023-09-13 DIAGNOSIS — Z90.13 S/P BILATERAL MASTECTOMY: ICD-10-CM

## 2023-09-13 PROCEDURE — 77334 RADIATION TREATMENT AID(S): CPT | Performed by: RADIOLOGY

## 2023-09-13 PROCEDURE — G0463 HOSPITAL OUTPT CLINIC VISIT: HCPCS | Performed by: RADIOLOGY

## 2023-09-13 PROCEDURE — 77290 THER RAD SIMULAJ FIELD CPLX: CPT | Performed by: RADIOLOGY

## 2023-09-13 NOTE — PATIENT INSTRUCTIONS
1) Plan on 33 daily treatments (Monday-Friday). Side effects may include blistering and peeling of the skin, fatigue.  2) We will call you when to start in about 2 weeks.

## 2023-09-19 NOTE — PROGRESS NOTES
HISTORY OF PRESENT ILLNESS:    Ms. Cecy Canales presents today for a one month post op following bilateral simple mastectomy with right sentinel lymph node biopsy on 8/15/2023    She was unable to finish the last 3 due to neuropathy. She is recently status post ultrasound guided breast biopsy on the right x's 2 areas. At 1:00 revealed a 1.6  cm high grade invasive ductal carcinoma. At 4:00 revealed a 1 cm intermediate grade invasive ductal mammary carcinoma. (1:00) ER positive at 99%. NE negative. Her2 positive. Ki67 55%. (4:00) ER positive at 96%. NE positive at 100%. Her2 positive. Ki67 14%. FISH Negative for both. Her repeat markers showed ER positive at 80%. NE positive at 3%. HER2 is equivocal 2+ and Ki-67 is 2%. Ultrasound-guided FNA of right axillary node was negative for malignancy    Mammaprint is High Risk Luminal B-Type     MRI-7/25/2023  FINDINGS:  Amount of Fibroglandular Tissue: Heterogeneous fibroglandular tissue. Background Parenchymal Enhancement:  Mild. Right breast: On prior exam there are 3 areas of abnormal kinetics  described in the right breast. The lesion in the upper inner quadrant  of the right breast does not demonstrate abnormal kinetics. The lesion in the upper outer right breast does not demonstrate  abnormal kinetics. The lesion in the medial proximal third of the right breast  demonstrates persistent abnormal kinetics. This is in the right inner  quadrant of the left breast 5:00 position 1.7 x 0.9 x 1.6 cm. Curve  peak is 80%. This is a biopsy-proven malignancy. Left breast: Heterogeneous fibroglandular tissue is present in the  left breast with no focal regions of architectural distortion or  abnormal kinetics. Nodes: A suspicious node in the right axilla is again noted. Appearance is concerning for willy disease and similar to prior exam  of January 12, 2023  Partially visualized chest/abdomen: Grossly unremarkable  Bones: No suspicious bony findings  IMPRESSION:  1.

## 2023-09-20 ENCOUNTER — OFFICE VISIT (OUTPATIENT)
Dept: SURGERY | Age: 68
End: 2023-09-20
Payer: MEDICARE

## 2023-09-20 VITALS — HEART RATE: 72 BPM | DIASTOLIC BLOOD PRESSURE: 72 MMHG | SYSTOLIC BLOOD PRESSURE: 128 MMHG

## 2023-09-20 DIAGNOSIS — Z90.13 STATUS POST BILATERAL MASTECTOMY: Primary | ICD-10-CM

## 2023-09-20 PROCEDURE — 99213 OFFICE O/P EST LOW 20 MIN: CPT | Performed by: SURGERY

## 2023-09-20 PROCEDURE — 1123F ACP DISCUSS/DSCN MKR DOCD: CPT | Performed by: SURGERY

## 2023-09-21 PROCEDURE — 77334 RADIATION TREATMENT AID(S): CPT | Performed by: RADIOLOGY

## 2023-09-21 PROCEDURE — 77300 RADIATION THERAPY DOSE PLAN: CPT | Performed by: RADIOLOGY

## 2023-09-21 PROCEDURE — 77295 3-D RADIOTHERAPY PLAN: CPT | Performed by: RADIOLOGY

## 2023-09-29 RX ORDER — LISINOPRIL AND HYDROCHLOROTHIAZIDE 12.5; 1 MG/1; MG/1
TABLET ORAL
Qty: 90 TABLET | Refills: 0 | Status: SHIPPED | OUTPATIENT
Start: 2023-09-29

## 2023-10-02 ENCOUNTER — HOSPITAL ENCOUNTER (OUTPATIENT)
Dept: RADIATION ONCOLOGY | Facility: HOSPITAL | Age: 68
Setting detail: RADIATION/ONCOLOGY SERIES
End: 2023-10-02
Payer: MEDICARE

## 2023-10-02 ENCOUNTER — HOSPITAL ENCOUNTER (OUTPATIENT)
Dept: RADIATION ONCOLOGY | Facility: HOSPITAL | Age: 68
Setting detail: RADIATION/ONCOLOGY SERIES
Discharge: HOME | End: 2023-10-02
Payer: MEDICARE

## 2023-10-02 LAB
RAD ONC ARIA COURSE ID: NORMAL
RAD ONC ARIA COURSE LAST TREATMENT DATE: NORMAL
RAD ONC ARIA COURSE START DATE: NORMAL
RAD ONC ARIA COURSE TREATMENT ELAPSED DAYS: 0
RAD ONC ARIA FIRST TREATMENT DATE: NORMAL
RAD ONC ARIA PLAN FRACTIONS TREATED TO DATE: 1
RAD ONC ARIA PLAN ID: NORMAL
RAD ONC ARIA PLAN PRESCRIBED DOSE PER FRACTION: 0.71 GY
RAD ONC ARIA PLAN PRESCRIBED DOSE PER FRACTION: 1.8 GY
RAD ONC ARIA PLAN PRESCRIBED DOSE PER FRACTION: 1.8 GY
RAD ONC ARIA PLAN PRIMARY REFERENCE POINT: NORMAL
RAD ONC ARIA PLAN TOTAL FRACTIONS PRESCRIBED: 28
RAD ONC ARIA PLAN TOTAL PRESCRIBED DOSE: 2000 CGY
RAD ONC ARIA PLAN TOTAL PRESCRIBED DOSE: 5040 CGY
RAD ONC ARIA PLAN TOTAL PRESCRIBED DOSE: 5040 CGY
RAD ONC ARIA REFERENCE POINT DOSAGE GIVEN TO DATE: 1.8 GY
RAD ONC ARIA REFERENCE POINT ID: NORMAL
RAD ONC ARIA REFERENCE POINT SESSION DOSAGE GIVEN: 1.8 GY

## 2023-10-02 PROCEDURE — 77412 RADIATION TX DELIVERY LVL 3: CPT | Performed by: RADIOLOGY

## 2023-10-02 PROCEDURE — 77417 THER RADIOLOGY PORT IMAGE(S): CPT | Performed by: RADIOLOGY

## 2023-10-03 ENCOUNTER — HOSPITAL ENCOUNTER (OUTPATIENT)
Dept: RADIATION ONCOLOGY | Facility: HOSPITAL | Age: 68
Setting detail: RADIATION/ONCOLOGY SERIES
Discharge: HOME | End: 2023-10-03
Payer: MEDICARE

## 2023-10-03 LAB
RAD ONC ARIA COURSE ID: NORMAL
RAD ONC ARIA COURSE LAST TREATMENT DATE: NORMAL
RAD ONC ARIA COURSE START DATE: NORMAL
RAD ONC ARIA COURSE TREATMENT ELAPSED DAYS: 1
RAD ONC ARIA FIRST TREATMENT DATE: NORMAL
RAD ONC ARIA PLAN FRACTIONS TREATED TO DATE: 2
RAD ONC ARIA PLAN ID: NORMAL
RAD ONC ARIA PLAN PRESCRIBED DOSE PER FRACTION: 0.71 GY
RAD ONC ARIA PLAN PRESCRIBED DOSE PER FRACTION: 1.8 GY
RAD ONC ARIA PLAN PRESCRIBED DOSE PER FRACTION: 1.8 GY
RAD ONC ARIA PLAN PRIMARY REFERENCE POINT: NORMAL
RAD ONC ARIA PLAN TOTAL FRACTIONS PRESCRIBED: 28
RAD ONC ARIA PLAN TOTAL PRESCRIBED DOSE: 2000 CGY
RAD ONC ARIA PLAN TOTAL PRESCRIBED DOSE: 5040 CGY
RAD ONC ARIA PLAN TOTAL PRESCRIBED DOSE: 5040 CGY
RAD ONC ARIA REFERENCE POINT DOSAGE GIVEN TO DATE: 3.6 GY
RAD ONC ARIA REFERENCE POINT ID: NORMAL
RAD ONC ARIA REFERENCE POINT SESSION DOSAGE GIVEN: 1.8 GY

## 2023-10-03 PROCEDURE — 77412 RADIATION TX DELIVERY LVL 3: CPT | Performed by: RADIOLOGY

## 2023-10-04 ENCOUNTER — DOCUMENTATION (OUTPATIENT)
Dept: RADIATION ONCOLOGY | Facility: HOSPITAL | Age: 68
End: 2023-10-04
Payer: MEDICARE

## 2023-10-04 ENCOUNTER — HOSPITAL ENCOUNTER (OUTPATIENT)
Dept: RADIATION ONCOLOGY | Facility: HOSPITAL | Age: 68
Setting detail: RADIATION/ONCOLOGY SERIES
Discharge: HOME | End: 2023-10-04
Payer: MEDICARE

## 2023-10-04 LAB
RAD ONC ARIA COURSE ID: NORMAL
RAD ONC ARIA COURSE LAST TREATMENT DATE: NORMAL
RAD ONC ARIA COURSE START DATE: NORMAL
RAD ONC ARIA COURSE TREATMENT ELAPSED DAYS: 2
RAD ONC ARIA FIRST TREATMENT DATE: NORMAL
RAD ONC ARIA PLAN FRACTIONS TREATED TO DATE: 3
RAD ONC ARIA PLAN ID: NORMAL
RAD ONC ARIA PLAN PRESCRIBED DOSE PER FRACTION: 0.71 GY
RAD ONC ARIA PLAN PRESCRIBED DOSE PER FRACTION: 1.8 GY
RAD ONC ARIA PLAN PRESCRIBED DOSE PER FRACTION: 1.8 GY
RAD ONC ARIA PLAN PRIMARY REFERENCE POINT: NORMAL
RAD ONC ARIA PLAN TOTAL FRACTIONS PRESCRIBED: 28
RAD ONC ARIA PLAN TOTAL PRESCRIBED DOSE: 2000 CGY
RAD ONC ARIA PLAN TOTAL PRESCRIBED DOSE: 5040 CGY
RAD ONC ARIA PLAN TOTAL PRESCRIBED DOSE: 5040 CGY
RAD ONC ARIA REFERENCE POINT DOSAGE GIVEN TO DATE: 5.4 GY
RAD ONC ARIA REFERENCE POINT ID: NORMAL
RAD ONC ARIA REFERENCE POINT SESSION DOSAGE GIVEN: 1.8 GY

## 2023-10-04 PROCEDURE — 77336 RADIATION PHYSICS CONSULT: CPT | Performed by: RADIOLOGY

## 2023-10-04 PROCEDURE — 77412 RADIATION TX DELIVERY LVL 3: CPT | Performed by: RADIOLOGY

## 2023-10-04 NOTE — PROGRESS NOTES
GEENA met with Mrs. Tipton who is currently receiving radiation treatment for malignant neoplasm of right female breast. GEENA introduced self and explained role and source of support. She is 68 years old and lives with her . She has a strong support system which includes her spouse, children, and friends. At this time, she does not have any financial or transportation concerns. She does not take any medication for anxiety/depression and does not see a counselor. No needs noted. GEENA encouraged her to call if assistance is needed in the future.

## 2023-10-05 ENCOUNTER — HOSPITAL ENCOUNTER (OUTPATIENT)
Dept: RADIATION ONCOLOGY | Facility: HOSPITAL | Age: 68
Setting detail: RADIATION/ONCOLOGY SERIES
Discharge: HOME | End: 2023-10-05
Payer: MEDICARE

## 2023-10-05 LAB
RAD ONC ARIA COURSE ID: NORMAL
RAD ONC ARIA COURSE LAST TREATMENT DATE: NORMAL
RAD ONC ARIA COURSE START DATE: NORMAL
RAD ONC ARIA COURSE TREATMENT ELAPSED DAYS: 3
RAD ONC ARIA FIRST TREATMENT DATE: NORMAL
RAD ONC ARIA PLAN FRACTIONS TREATED TO DATE: 4
RAD ONC ARIA PLAN ID: NORMAL
RAD ONC ARIA PLAN PRESCRIBED DOSE PER FRACTION: 0.71 GY
RAD ONC ARIA PLAN PRESCRIBED DOSE PER FRACTION: 1.8 GY
RAD ONC ARIA PLAN PRESCRIBED DOSE PER FRACTION: 1.8 GY
RAD ONC ARIA PLAN PRIMARY REFERENCE POINT: NORMAL
RAD ONC ARIA PLAN TOTAL FRACTIONS PRESCRIBED: 28
RAD ONC ARIA PLAN TOTAL PRESCRIBED DOSE: 2000 CGY
RAD ONC ARIA PLAN TOTAL PRESCRIBED DOSE: 5040 CGY
RAD ONC ARIA PLAN TOTAL PRESCRIBED DOSE: 5040 CGY
RAD ONC ARIA REFERENCE POINT DOSAGE GIVEN TO DATE: 7.2 GY
RAD ONC ARIA REFERENCE POINT ID: NORMAL
RAD ONC ARIA REFERENCE POINT SESSION DOSAGE GIVEN: 1.8 GY

## 2023-10-05 PROCEDURE — 77412 RADIATION TX DELIVERY LVL 3: CPT | Performed by: RADIOLOGY

## 2023-10-06 ENCOUNTER — HOSPITAL ENCOUNTER (OUTPATIENT)
Dept: RADIATION ONCOLOGY | Facility: HOSPITAL | Age: 68
Discharge: HOME | End: 2023-10-06
Payer: MEDICARE

## 2023-10-06 LAB
RAD ONC ARIA COURSE ID: NORMAL
RAD ONC ARIA COURSE LAST TREATMENT DATE: NORMAL
RAD ONC ARIA COURSE START DATE: NORMAL
RAD ONC ARIA COURSE TREATMENT ELAPSED DAYS: 4
RAD ONC ARIA FIRST TREATMENT DATE: NORMAL
RAD ONC ARIA PLAN FRACTIONS TREATED TO DATE: 5
RAD ONC ARIA PLAN ID: NORMAL
RAD ONC ARIA PLAN PRESCRIBED DOSE PER FRACTION: 0.71 GY
RAD ONC ARIA PLAN PRESCRIBED DOSE PER FRACTION: 1.8 GY
RAD ONC ARIA PLAN PRESCRIBED DOSE PER FRACTION: 1.8 GY
RAD ONC ARIA PLAN PRIMARY REFERENCE POINT: NORMAL
RAD ONC ARIA PLAN TOTAL FRACTIONS PRESCRIBED: 28
RAD ONC ARIA PLAN TOTAL PRESCRIBED DOSE: 2000 CGY
RAD ONC ARIA PLAN TOTAL PRESCRIBED DOSE: 5040 CGY
RAD ONC ARIA PLAN TOTAL PRESCRIBED DOSE: 5040 CGY
RAD ONC ARIA REFERENCE POINT DOSAGE GIVEN TO DATE: 9 GY
RAD ONC ARIA REFERENCE POINT ID: NORMAL
RAD ONC ARIA REFERENCE POINT SESSION DOSAGE GIVEN: 1.8 GY

## 2023-10-06 PROCEDURE — 77412 RADIATION TX DELIVERY LVL 3: CPT | Performed by: RADIOLOGY

## 2023-10-09 ENCOUNTER — HOSPITAL ENCOUNTER (OUTPATIENT)
Dept: RADIATION ONCOLOGY | Facility: HOSPITAL | Age: 68
Setting detail: RADIATION/ONCOLOGY SERIES
Discharge: HOME | End: 2023-10-09
Payer: MEDICARE

## 2023-10-09 LAB
RAD ONC ARIA COURSE ID: NORMAL
RAD ONC ARIA COURSE LAST TREATMENT DATE: NORMAL
RAD ONC ARIA COURSE START DATE: NORMAL
RAD ONC ARIA COURSE TREATMENT ELAPSED DAYS: 7
RAD ONC ARIA FIRST TREATMENT DATE: NORMAL
RAD ONC ARIA PLAN FRACTIONS TREATED TO DATE: 6
RAD ONC ARIA PLAN ID: NORMAL
RAD ONC ARIA PLAN PRESCRIBED DOSE PER FRACTION: 0.71 GY
RAD ONC ARIA PLAN PRESCRIBED DOSE PER FRACTION: 1.8 GY
RAD ONC ARIA PLAN PRESCRIBED DOSE PER FRACTION: 1.8 GY
RAD ONC ARIA PLAN PRIMARY REFERENCE POINT: NORMAL
RAD ONC ARIA PLAN TOTAL FRACTIONS PRESCRIBED: 28
RAD ONC ARIA PLAN TOTAL PRESCRIBED DOSE: 2000 CGY
RAD ONC ARIA PLAN TOTAL PRESCRIBED DOSE: 5040 CGY
RAD ONC ARIA PLAN TOTAL PRESCRIBED DOSE: 5040 CGY
RAD ONC ARIA REFERENCE POINT DOSAGE GIVEN TO DATE: 10.8 GY
RAD ONC ARIA REFERENCE POINT ID: NORMAL
RAD ONC ARIA REFERENCE POINT SESSION DOSAGE GIVEN: 1.8 GY

## 2023-10-09 PROCEDURE — 77412 RADIATION TX DELIVERY LVL 3: CPT | Performed by: RADIOLOGY

## 2023-10-10 ENCOUNTER — HOSPITAL ENCOUNTER (OUTPATIENT)
Dept: RADIATION ONCOLOGY | Facility: HOSPITAL | Age: 68
Setting detail: RADIATION/ONCOLOGY SERIES
Discharge: HOME | End: 2023-10-10
Payer: MEDICARE

## 2023-10-10 LAB
RAD ONC ARIA COURSE ID: NORMAL
RAD ONC ARIA COURSE LAST TREATMENT DATE: NORMAL
RAD ONC ARIA COURSE START DATE: NORMAL
RAD ONC ARIA COURSE TREATMENT ELAPSED DAYS: 8
RAD ONC ARIA FIRST TREATMENT DATE: NORMAL
RAD ONC ARIA PLAN FRACTIONS TREATED TO DATE: 7
RAD ONC ARIA PLAN ID: NORMAL
RAD ONC ARIA PLAN PRESCRIBED DOSE PER FRACTION: 0.71 GY
RAD ONC ARIA PLAN PRESCRIBED DOSE PER FRACTION: 1.8 GY
RAD ONC ARIA PLAN PRESCRIBED DOSE PER FRACTION: 1.8 GY
RAD ONC ARIA PLAN PRIMARY REFERENCE POINT: NORMAL
RAD ONC ARIA PLAN TOTAL FRACTIONS PRESCRIBED: 28
RAD ONC ARIA PLAN TOTAL PRESCRIBED DOSE: 2000 CGY
RAD ONC ARIA PLAN TOTAL PRESCRIBED DOSE: 5040 CGY
RAD ONC ARIA PLAN TOTAL PRESCRIBED DOSE: 5040 CGY
RAD ONC ARIA REFERENCE POINT DOSAGE GIVEN TO DATE: 12.6 GY
RAD ONC ARIA REFERENCE POINT ID: NORMAL
RAD ONC ARIA REFERENCE POINT SESSION DOSAGE GIVEN: 1.8 GY

## 2023-10-10 PROCEDURE — 77412 RADIATION TX DELIVERY LVL 3: CPT | Performed by: RADIOLOGY

## 2023-10-11 ENCOUNTER — HOSPITAL ENCOUNTER (OUTPATIENT)
Dept: RADIATION ONCOLOGY | Facility: HOSPITAL | Age: 68
Setting detail: RADIATION/ONCOLOGY SERIES
Discharge: HOME | End: 2023-10-11
Payer: MEDICARE

## 2023-10-11 LAB
RAD ONC ARIA COURSE ID: NORMAL
RAD ONC ARIA COURSE LAST TREATMENT DATE: NORMAL
RAD ONC ARIA COURSE START DATE: NORMAL
RAD ONC ARIA COURSE TREATMENT ELAPSED DAYS: 9
RAD ONC ARIA FIRST TREATMENT DATE: NORMAL
RAD ONC ARIA PLAN FRACTIONS TREATED TO DATE: 8
RAD ONC ARIA PLAN ID: NORMAL
RAD ONC ARIA PLAN PRESCRIBED DOSE PER FRACTION: 0.71 GY
RAD ONC ARIA PLAN PRESCRIBED DOSE PER FRACTION: 1.8 GY
RAD ONC ARIA PLAN PRESCRIBED DOSE PER FRACTION: 1.8 GY
RAD ONC ARIA PLAN PRIMARY REFERENCE POINT: NORMAL
RAD ONC ARIA PLAN TOTAL FRACTIONS PRESCRIBED: 28
RAD ONC ARIA PLAN TOTAL PRESCRIBED DOSE: 2000 CGY
RAD ONC ARIA PLAN TOTAL PRESCRIBED DOSE: 5040 CGY
RAD ONC ARIA PLAN TOTAL PRESCRIBED DOSE: 5040 CGY
RAD ONC ARIA REFERENCE POINT DOSAGE GIVEN TO DATE: 14.4 GY
RAD ONC ARIA REFERENCE POINT ID: NORMAL
RAD ONC ARIA REFERENCE POINT SESSION DOSAGE GIVEN: 1.8 GY

## 2023-10-11 PROCEDURE — 77321 SPECIAL TELETX PORT PLAN: CPT | Performed by: RADIOLOGY

## 2023-10-11 PROCEDURE — 77290 THER RAD SIMULAJ FIELD CPLX: CPT | Performed by: RADIOLOGY

## 2023-10-11 PROCEDURE — 77412 RADIATION TX DELIVERY LVL 3: CPT | Performed by: RADIOLOGY

## 2023-10-11 PROCEDURE — 77307 TELETHX ISODOSE PLAN CPLX: CPT | Performed by: RADIOLOGY

## 2023-10-11 PROCEDURE — 77334 RADIATION TREATMENT AID(S): CPT | Performed by: RADIOLOGY

## 2023-10-12 ENCOUNTER — HOSPITAL ENCOUNTER (OUTPATIENT)
Dept: RADIATION ONCOLOGY | Facility: HOSPITAL | Age: 68
Setting detail: RADIATION/ONCOLOGY SERIES
Discharge: HOME | End: 2023-10-12
Payer: MEDICARE

## 2023-10-12 LAB
RAD ONC ARIA COURSE ID: NORMAL
RAD ONC ARIA COURSE LAST TREATMENT DATE: NORMAL
RAD ONC ARIA COURSE START DATE: NORMAL
RAD ONC ARIA COURSE TREATMENT ELAPSED DAYS: 10
RAD ONC ARIA FIRST TREATMENT DATE: NORMAL
RAD ONC ARIA PLAN FRACTIONS TREATED TO DATE: 9
RAD ONC ARIA PLAN ID: NORMAL
RAD ONC ARIA PLAN PRESCRIBED DOSE PER FRACTION: 0.71 GY
RAD ONC ARIA PLAN PRESCRIBED DOSE PER FRACTION: 1.8 GY
RAD ONC ARIA PLAN PRESCRIBED DOSE PER FRACTION: 1.8 GY
RAD ONC ARIA PLAN PRIMARY REFERENCE POINT: NORMAL
RAD ONC ARIA PLAN TOTAL FRACTIONS PRESCRIBED: 28
RAD ONC ARIA PLAN TOTAL PRESCRIBED DOSE: 2000 CGY
RAD ONC ARIA PLAN TOTAL PRESCRIBED DOSE: 5040 CGY
RAD ONC ARIA PLAN TOTAL PRESCRIBED DOSE: 5040 CGY
RAD ONC ARIA REFERENCE POINT DOSAGE GIVEN TO DATE: 16.2 GY
RAD ONC ARIA REFERENCE POINT ID: NORMAL
RAD ONC ARIA REFERENCE POINT SESSION DOSAGE GIVEN: 1.8 GY

## 2023-10-12 PROCEDURE — 77412 RADIATION TX DELIVERY LVL 3: CPT | Performed by: RADIOLOGY

## 2023-10-12 PROCEDURE — 77417 THER RADIOLOGY PORT IMAGE(S): CPT | Performed by: RADIOLOGY

## 2023-10-13 ENCOUNTER — HOSPITAL ENCOUNTER (OUTPATIENT)
Dept: RADIATION ONCOLOGY | Facility: HOSPITAL | Age: 68
Setting detail: RADIATION/ONCOLOGY SERIES
Discharge: HOME | End: 2023-10-13
Payer: MEDICARE

## 2023-10-13 LAB
RAD ONC ARIA COURSE ID: NORMAL
RAD ONC ARIA COURSE LAST TREATMENT DATE: NORMAL
RAD ONC ARIA COURSE START DATE: NORMAL
RAD ONC ARIA COURSE TREATMENT ELAPSED DAYS: 11
RAD ONC ARIA FIRST TREATMENT DATE: NORMAL
RAD ONC ARIA PLAN FRACTIONS TREATED TO DATE: 10
RAD ONC ARIA PLAN ID: NORMAL
RAD ONC ARIA PLAN PRESCRIBED DOSE PER FRACTION: 0.71 GY
RAD ONC ARIA PLAN PRESCRIBED DOSE PER FRACTION: 1.8 GY
RAD ONC ARIA PLAN PRESCRIBED DOSE PER FRACTION: 1.8 GY
RAD ONC ARIA PLAN PRIMARY REFERENCE POINT: NORMAL
RAD ONC ARIA PLAN TOTAL FRACTIONS PRESCRIBED: 28
RAD ONC ARIA PLAN TOTAL PRESCRIBED DOSE: 2000 CGY
RAD ONC ARIA PLAN TOTAL PRESCRIBED DOSE: 5040 CGY
RAD ONC ARIA PLAN TOTAL PRESCRIBED DOSE: 5040 CGY
RAD ONC ARIA REFERENCE POINT DOSAGE GIVEN TO DATE: 18 GY
RAD ONC ARIA REFERENCE POINT ID: NORMAL
RAD ONC ARIA REFERENCE POINT SESSION DOSAGE GIVEN: 1.8 GY

## 2023-10-13 PROCEDURE — 77412 RADIATION TX DELIVERY LVL 3: CPT | Performed by: RADIOLOGY

## 2023-10-13 PROCEDURE — 77336 RADIATION PHYSICS CONSULT: CPT | Performed by: RADIOLOGY

## 2023-10-16 ENCOUNTER — HOSPITAL ENCOUNTER (OUTPATIENT)
Dept: RADIATION ONCOLOGY | Facility: HOSPITAL | Age: 68
Setting detail: RADIATION/ONCOLOGY SERIES
Discharge: HOME | End: 2023-10-16
Payer: MEDICARE

## 2023-10-16 LAB
RAD ONC ARIA COURSE ID: NORMAL
RAD ONC ARIA COURSE LAST TREATMENT DATE: NORMAL
RAD ONC ARIA COURSE START DATE: NORMAL
RAD ONC ARIA COURSE TREATMENT ELAPSED DAYS: 14
RAD ONC ARIA FIRST TREATMENT DATE: NORMAL
RAD ONC ARIA PLAN FRACTIONS TREATED TO DATE: 11
RAD ONC ARIA PLAN ID: NORMAL
RAD ONC ARIA PLAN PRESCRIBED DOSE PER FRACTION: 0.71 GY
RAD ONC ARIA PLAN PRESCRIBED DOSE PER FRACTION: 1.8 GY
RAD ONC ARIA PLAN PRESCRIBED DOSE PER FRACTION: 1.8 GY
RAD ONC ARIA PLAN PRIMARY REFERENCE POINT: NORMAL
RAD ONC ARIA PLAN TOTAL FRACTIONS PRESCRIBED: 28
RAD ONC ARIA PLAN TOTAL PRESCRIBED DOSE: 2000 CGY
RAD ONC ARIA PLAN TOTAL PRESCRIBED DOSE: 5040 CGY
RAD ONC ARIA PLAN TOTAL PRESCRIBED DOSE: 5040 CGY
RAD ONC ARIA REFERENCE POINT DOSAGE GIVEN TO DATE: 19.8 GY
RAD ONC ARIA REFERENCE POINT ID: NORMAL
RAD ONC ARIA REFERENCE POINT SESSION DOSAGE GIVEN: 1.8 GY

## 2023-10-16 PROCEDURE — 77412 RADIATION TX DELIVERY LVL 3: CPT | Performed by: RADIOLOGY

## 2023-10-16 RX ORDER — LETROZOLE 2.5 MG/1
2.5 TABLET, FILM COATED ORAL DAILY
Qty: 30 TABLET | Refills: 3 | Status: SHIPPED | OUTPATIENT
Start: 2023-10-16

## 2023-10-17 ENCOUNTER — HOSPITAL ENCOUNTER (OUTPATIENT)
Dept: RADIATION ONCOLOGY | Facility: HOSPITAL | Age: 68
Setting detail: RADIATION/ONCOLOGY SERIES
Discharge: HOME | End: 2023-10-17
Payer: MEDICARE

## 2023-10-17 LAB
RAD ONC ARIA COURSE ID: NORMAL
RAD ONC ARIA COURSE LAST TREATMENT DATE: NORMAL
RAD ONC ARIA COURSE START DATE: NORMAL
RAD ONC ARIA COURSE TREATMENT ELAPSED DAYS: 15
RAD ONC ARIA FIRST TREATMENT DATE: NORMAL
RAD ONC ARIA PLAN FRACTIONS TREATED TO DATE: 12
RAD ONC ARIA PLAN ID: NORMAL
RAD ONC ARIA PLAN PRESCRIBED DOSE PER FRACTION: 0.71 GY
RAD ONC ARIA PLAN PRESCRIBED DOSE PER FRACTION: 1.8 GY
RAD ONC ARIA PLAN PRESCRIBED DOSE PER FRACTION: 1.8 GY
RAD ONC ARIA PLAN PRIMARY REFERENCE POINT: NORMAL
RAD ONC ARIA PLAN TOTAL FRACTIONS PRESCRIBED: 28
RAD ONC ARIA PLAN TOTAL PRESCRIBED DOSE: 2000 CGY
RAD ONC ARIA PLAN TOTAL PRESCRIBED DOSE: 5040 CGY
RAD ONC ARIA PLAN TOTAL PRESCRIBED DOSE: 5040 CGY
RAD ONC ARIA REFERENCE POINT DOSAGE GIVEN TO DATE: 21.6 GY
RAD ONC ARIA REFERENCE POINT ID: NORMAL
RAD ONC ARIA REFERENCE POINT SESSION DOSAGE GIVEN: 1.8 GY

## 2023-10-17 PROCEDURE — 77412 RADIATION TX DELIVERY LVL 3: CPT | Performed by: RADIOLOGY

## 2023-10-18 ENCOUNTER — HOSPITAL ENCOUNTER (OUTPATIENT)
Dept: RADIATION ONCOLOGY | Facility: HOSPITAL | Age: 68
Setting detail: RADIATION/ONCOLOGY SERIES
Discharge: HOME | End: 2023-10-18
Payer: MEDICARE

## 2023-10-18 LAB
RAD ONC ARIA COURSE ID: NORMAL
RAD ONC ARIA COURSE LAST TREATMENT DATE: NORMAL
RAD ONC ARIA COURSE START DATE: NORMAL
RAD ONC ARIA COURSE TREATMENT ELAPSED DAYS: 16
RAD ONC ARIA FIRST TREATMENT DATE: NORMAL
RAD ONC ARIA PLAN FRACTIONS TREATED TO DATE: 13
RAD ONC ARIA PLAN ID: NORMAL
RAD ONC ARIA PLAN PRESCRIBED DOSE PER FRACTION: 0.71 GY
RAD ONC ARIA PLAN PRESCRIBED DOSE PER FRACTION: 1.8 GY
RAD ONC ARIA PLAN PRESCRIBED DOSE PER FRACTION: 1.8 GY
RAD ONC ARIA PLAN PRIMARY REFERENCE POINT: NORMAL
RAD ONC ARIA PLAN TOTAL FRACTIONS PRESCRIBED: 28
RAD ONC ARIA PLAN TOTAL PRESCRIBED DOSE: 2000 CGY
RAD ONC ARIA PLAN TOTAL PRESCRIBED DOSE: 5040 CGY
RAD ONC ARIA PLAN TOTAL PRESCRIBED DOSE: 5040 CGY
RAD ONC ARIA REFERENCE POINT DOSAGE GIVEN TO DATE: 23.4 GY
RAD ONC ARIA REFERENCE POINT ID: NORMAL
RAD ONC ARIA REFERENCE POINT SESSION DOSAGE GIVEN: 1.8 GY

## 2023-10-18 PROCEDURE — 77412 RADIATION TX DELIVERY LVL 3: CPT | Performed by: RADIOLOGY

## 2023-10-18 PROCEDURE — 77336 RADIATION PHYSICS CONSULT: CPT | Performed by: RADIOLOGY

## 2023-10-19 ENCOUNTER — HOSPITAL ENCOUNTER (OUTPATIENT)
Dept: RADIATION ONCOLOGY | Facility: HOSPITAL | Age: 68
Setting detail: RADIATION/ONCOLOGY SERIES
Discharge: HOME | End: 2023-10-19
Payer: MEDICARE

## 2023-10-19 LAB
RAD ONC ARIA COURSE ID: NORMAL
RAD ONC ARIA COURSE LAST TREATMENT DATE: NORMAL
RAD ONC ARIA COURSE START DATE: NORMAL
RAD ONC ARIA COURSE TREATMENT ELAPSED DAYS: 17
RAD ONC ARIA FIRST TREATMENT DATE: NORMAL
RAD ONC ARIA PLAN FRACTIONS TREATED TO DATE: 14
RAD ONC ARIA PLAN ID: NORMAL
RAD ONC ARIA PLAN PRESCRIBED DOSE PER FRACTION: 0.71 GY
RAD ONC ARIA PLAN PRESCRIBED DOSE PER FRACTION: 1.8 GY
RAD ONC ARIA PLAN PRESCRIBED DOSE PER FRACTION: 1.8 GY
RAD ONC ARIA PLAN PRIMARY REFERENCE POINT: NORMAL
RAD ONC ARIA PLAN TOTAL FRACTIONS PRESCRIBED: 28
RAD ONC ARIA PLAN TOTAL PRESCRIBED DOSE: 2000 CGY
RAD ONC ARIA PLAN TOTAL PRESCRIBED DOSE: 5040 CGY
RAD ONC ARIA PLAN TOTAL PRESCRIBED DOSE: 5040 CGY
RAD ONC ARIA REFERENCE POINT DOSAGE GIVEN TO DATE: 25.2 GY
RAD ONC ARIA REFERENCE POINT ID: NORMAL
RAD ONC ARIA REFERENCE POINT SESSION DOSAGE GIVEN: 1.8 GY

## 2023-10-19 PROCEDURE — 77412 RADIATION TX DELIVERY LVL 3: CPT | Performed by: RADIOLOGY

## 2023-10-19 PROCEDURE — 77417 THER RADIOLOGY PORT IMAGE(S): CPT | Performed by: RADIOLOGY

## 2023-10-20 ENCOUNTER — HOSPITAL ENCOUNTER (OUTPATIENT)
Dept: RADIATION ONCOLOGY | Facility: HOSPITAL | Age: 68
Discharge: HOME | End: 2023-10-20

## 2023-10-20 LAB
RAD ONC ARIA COURSE ID: NORMAL
RAD ONC ARIA COURSE LAST TREATMENT DATE: NORMAL
RAD ONC ARIA COURSE START DATE: NORMAL
RAD ONC ARIA COURSE TREATMENT ELAPSED DAYS: 18
RAD ONC ARIA FIRST TREATMENT DATE: NORMAL
RAD ONC ARIA PLAN FRACTIONS TREATED TO DATE: 15
RAD ONC ARIA PLAN ID: NORMAL
RAD ONC ARIA PLAN PRESCRIBED DOSE PER FRACTION: 0.71 GY
RAD ONC ARIA PLAN PRESCRIBED DOSE PER FRACTION: 1.8 GY
RAD ONC ARIA PLAN PRESCRIBED DOSE PER FRACTION: 1.8 GY
RAD ONC ARIA PLAN PRIMARY REFERENCE POINT: NORMAL
RAD ONC ARIA PLAN TOTAL FRACTIONS PRESCRIBED: 28
RAD ONC ARIA PLAN TOTAL PRESCRIBED DOSE: 2000 CGY
RAD ONC ARIA PLAN TOTAL PRESCRIBED DOSE: 5040 CGY
RAD ONC ARIA PLAN TOTAL PRESCRIBED DOSE: 5040 CGY
RAD ONC ARIA REFERENCE POINT DOSAGE GIVEN TO DATE: 27 GY
RAD ONC ARIA REFERENCE POINT ID: NORMAL
RAD ONC ARIA REFERENCE POINT SESSION DOSAGE GIVEN: 1.8 GY

## 2023-10-20 PROCEDURE — 77412 RADIATION TX DELIVERY LVL 3: CPT | Performed by: RADIOLOGY

## 2023-10-23 ENCOUNTER — HOSPITAL ENCOUNTER (OUTPATIENT)
Dept: RADIATION ONCOLOGY | Facility: HOSPITAL | Age: 68
Setting detail: RADIATION/ONCOLOGY SERIES
Discharge: HOME | End: 2023-10-23
Payer: MEDICARE

## 2023-10-23 LAB
RAD ONC ARIA COURSE ID: NORMAL
RAD ONC ARIA COURSE LAST TREATMENT DATE: NORMAL
RAD ONC ARIA COURSE START DATE: NORMAL
RAD ONC ARIA COURSE TREATMENT ELAPSED DAYS: 21
RAD ONC ARIA FIRST TREATMENT DATE: NORMAL
RAD ONC ARIA PLAN FRACTIONS TREATED TO DATE: 16
RAD ONC ARIA PLAN ID: NORMAL
RAD ONC ARIA PLAN PRESCRIBED DOSE PER FRACTION: 0.71 GY
RAD ONC ARIA PLAN PRESCRIBED DOSE PER FRACTION: 1.8 GY
RAD ONC ARIA PLAN PRESCRIBED DOSE PER FRACTION: 1.8 GY
RAD ONC ARIA PLAN PRIMARY REFERENCE POINT: NORMAL
RAD ONC ARIA PLAN TOTAL FRACTIONS PRESCRIBED: 28
RAD ONC ARIA PLAN TOTAL PRESCRIBED DOSE: 2000 CGY
RAD ONC ARIA PLAN TOTAL PRESCRIBED DOSE: 5040 CGY
RAD ONC ARIA PLAN TOTAL PRESCRIBED DOSE: 5040 CGY
RAD ONC ARIA REFERENCE POINT DOSAGE GIVEN TO DATE: 28.8 GY
RAD ONC ARIA REFERENCE POINT ID: NORMAL
RAD ONC ARIA REFERENCE POINT SESSION DOSAGE GIVEN: 1.8 GY

## 2023-10-23 PROCEDURE — 77412 RADIATION TX DELIVERY LVL 3: CPT | Performed by: RADIOLOGY

## 2023-10-24 ENCOUNTER — HOSPITAL ENCOUNTER (OUTPATIENT)
Dept: RADIATION ONCOLOGY | Facility: HOSPITAL | Age: 68
Setting detail: RADIATION/ONCOLOGY SERIES
Discharge: HOME | End: 2023-10-24
Payer: MEDICARE

## 2023-10-24 LAB
RAD ONC ARIA COURSE ID: NORMAL
RAD ONC ARIA COURSE LAST TREATMENT DATE: NORMAL
RAD ONC ARIA COURSE START DATE: NORMAL
RAD ONC ARIA COURSE TREATMENT ELAPSED DAYS: 22
RAD ONC ARIA FIRST TREATMENT DATE: NORMAL
RAD ONC ARIA PLAN FRACTIONS TREATED TO DATE: 17
RAD ONC ARIA PLAN ID: NORMAL
RAD ONC ARIA PLAN PRESCRIBED DOSE PER FRACTION: 0.71 GY
RAD ONC ARIA PLAN PRESCRIBED DOSE PER FRACTION: 1.8 GY
RAD ONC ARIA PLAN PRESCRIBED DOSE PER FRACTION: 1.8 GY
RAD ONC ARIA PLAN PRIMARY REFERENCE POINT: NORMAL
RAD ONC ARIA PLAN TOTAL FRACTIONS PRESCRIBED: 28
RAD ONC ARIA PLAN TOTAL PRESCRIBED DOSE: 2000 CGY
RAD ONC ARIA PLAN TOTAL PRESCRIBED DOSE: 5040 CGY
RAD ONC ARIA PLAN TOTAL PRESCRIBED DOSE: 5040 CGY
RAD ONC ARIA REFERENCE POINT DOSAGE GIVEN TO DATE: 30.6 GY
RAD ONC ARIA REFERENCE POINT ID: NORMAL
RAD ONC ARIA REFERENCE POINT SESSION DOSAGE GIVEN: 1.8 GY

## 2023-10-24 PROCEDURE — 77412 RADIATION TX DELIVERY LVL 3: CPT | Performed by: RADIOLOGY

## 2023-10-25 ENCOUNTER — HOSPITAL ENCOUNTER (OUTPATIENT)
Dept: RADIATION ONCOLOGY | Facility: HOSPITAL | Age: 68
Setting detail: RADIATION/ONCOLOGY SERIES
Discharge: HOME | End: 2023-10-25
Payer: MEDICARE

## 2023-10-25 LAB
RAD ONC ARIA COURSE ID: NORMAL
RAD ONC ARIA COURSE LAST TREATMENT DATE: NORMAL
RAD ONC ARIA COURSE START DATE: NORMAL
RAD ONC ARIA COURSE TREATMENT ELAPSED DAYS: 23
RAD ONC ARIA FIRST TREATMENT DATE: NORMAL
RAD ONC ARIA PLAN FRACTIONS TREATED TO DATE: 18
RAD ONC ARIA PLAN ID: NORMAL
RAD ONC ARIA PLAN PRESCRIBED DOSE PER FRACTION: 0.71 GY
RAD ONC ARIA PLAN PRESCRIBED DOSE PER FRACTION: 1.8 GY
RAD ONC ARIA PLAN PRESCRIBED DOSE PER FRACTION: 1.8 GY
RAD ONC ARIA PLAN PRIMARY REFERENCE POINT: NORMAL
RAD ONC ARIA PLAN TOTAL FRACTIONS PRESCRIBED: 28
RAD ONC ARIA PLAN TOTAL PRESCRIBED DOSE: 2000 CGY
RAD ONC ARIA PLAN TOTAL PRESCRIBED DOSE: 5040 CGY
RAD ONC ARIA PLAN TOTAL PRESCRIBED DOSE: 5040 CGY
RAD ONC ARIA REFERENCE POINT DOSAGE GIVEN TO DATE: 32.4 GY
RAD ONC ARIA REFERENCE POINT ID: NORMAL
RAD ONC ARIA REFERENCE POINT SESSION DOSAGE GIVEN: 1.8 GY

## 2023-10-25 PROCEDURE — 77412 RADIATION TX DELIVERY LVL 3: CPT | Performed by: RADIOLOGY

## 2023-10-25 PROCEDURE — 77336 RADIATION PHYSICS CONSULT: CPT | Performed by: RADIOLOGY

## 2023-10-26 ENCOUNTER — HOSPITAL ENCOUNTER (OUTPATIENT)
Dept: RADIATION ONCOLOGY | Facility: HOSPITAL | Age: 68
Setting detail: RADIATION/ONCOLOGY SERIES
Discharge: HOME | End: 2023-10-26
Payer: MEDICARE

## 2023-10-26 LAB
RAD ONC ARIA COURSE ID: NORMAL
RAD ONC ARIA COURSE LAST TREATMENT DATE: NORMAL
RAD ONC ARIA COURSE START DATE: NORMAL
RAD ONC ARIA COURSE TREATMENT ELAPSED DAYS: 24
RAD ONC ARIA FIRST TREATMENT DATE: NORMAL
RAD ONC ARIA PLAN FRACTIONS TREATED TO DATE: 19
RAD ONC ARIA PLAN ID: NORMAL
RAD ONC ARIA PLAN PRESCRIBED DOSE PER FRACTION: 0.71 GY
RAD ONC ARIA PLAN PRESCRIBED DOSE PER FRACTION: 1.8 GY
RAD ONC ARIA PLAN PRESCRIBED DOSE PER FRACTION: 1.8 GY
RAD ONC ARIA PLAN PRIMARY REFERENCE POINT: NORMAL
RAD ONC ARIA PLAN TOTAL FRACTIONS PRESCRIBED: 28
RAD ONC ARIA PLAN TOTAL PRESCRIBED DOSE: 2000 CGY
RAD ONC ARIA PLAN TOTAL PRESCRIBED DOSE: 5040 CGY
RAD ONC ARIA PLAN TOTAL PRESCRIBED DOSE: 5040 CGY
RAD ONC ARIA REFERENCE POINT DOSAGE GIVEN TO DATE: 34.2 GY
RAD ONC ARIA REFERENCE POINT ID: NORMAL
RAD ONC ARIA REFERENCE POINT SESSION DOSAGE GIVEN: 1.8 GY

## 2023-10-26 PROCEDURE — 77417 THER RADIOLOGY PORT IMAGE(S): CPT | Performed by: RADIOLOGY

## 2023-10-26 PROCEDURE — 77412 RADIATION TX DELIVERY LVL 3: CPT | Performed by: RADIOLOGY

## 2023-10-27 ENCOUNTER — HOSPITAL ENCOUNTER (OUTPATIENT)
Dept: RADIATION ONCOLOGY | Facility: HOSPITAL | Age: 68
Setting detail: RADIATION/ONCOLOGY SERIES
Discharge: HOME | End: 2023-10-27
Payer: MEDICARE

## 2023-10-27 LAB
RAD ONC ARIA COURSE ID: NORMAL
RAD ONC ARIA COURSE LAST TREATMENT DATE: NORMAL
RAD ONC ARIA COURSE START DATE: NORMAL
RAD ONC ARIA COURSE TREATMENT ELAPSED DAYS: 25
RAD ONC ARIA FIRST TREATMENT DATE: NORMAL
RAD ONC ARIA PLAN FRACTIONS TREATED TO DATE: 20
RAD ONC ARIA PLAN ID: NORMAL
RAD ONC ARIA PLAN PRESCRIBED DOSE PER FRACTION: 0.71 GY
RAD ONC ARIA PLAN PRESCRIBED DOSE PER FRACTION: 1.8 GY
RAD ONC ARIA PLAN PRESCRIBED DOSE PER FRACTION: 1.8 GY
RAD ONC ARIA PLAN PRIMARY REFERENCE POINT: NORMAL
RAD ONC ARIA PLAN TOTAL FRACTIONS PRESCRIBED: 28
RAD ONC ARIA PLAN TOTAL PRESCRIBED DOSE: 2000 CGY
RAD ONC ARIA PLAN TOTAL PRESCRIBED DOSE: 5040 CGY
RAD ONC ARIA PLAN TOTAL PRESCRIBED DOSE: 5040 CGY
RAD ONC ARIA REFERENCE POINT DOSAGE GIVEN TO DATE: 36 GY
RAD ONC ARIA REFERENCE POINT ID: NORMAL
RAD ONC ARIA REFERENCE POINT SESSION DOSAGE GIVEN: 1.8 GY

## 2023-10-27 PROCEDURE — 77412 RADIATION TX DELIVERY LVL 3: CPT | Performed by: RADIOLOGY

## 2023-10-30 ENCOUNTER — HOSPITAL ENCOUNTER (OUTPATIENT)
Dept: RADIATION ONCOLOGY | Facility: HOSPITAL | Age: 68
Setting detail: RADIATION/ONCOLOGY SERIES
Discharge: HOME | End: 2023-10-30
Payer: MEDICARE

## 2023-10-30 LAB
RAD ONC ARIA COURSE ID: NORMAL
RAD ONC ARIA COURSE LAST TREATMENT DATE: NORMAL
RAD ONC ARIA COURSE START DATE: NORMAL
RAD ONC ARIA COURSE TREATMENT ELAPSED DAYS: 28
RAD ONC ARIA FIRST TREATMENT DATE: NORMAL
RAD ONC ARIA PLAN FRACTIONS TREATED TO DATE: 21
RAD ONC ARIA PLAN ID: NORMAL
RAD ONC ARIA PLAN PRESCRIBED DOSE PER FRACTION: 0.71 GY
RAD ONC ARIA PLAN PRESCRIBED DOSE PER FRACTION: 1.8 GY
RAD ONC ARIA PLAN PRESCRIBED DOSE PER FRACTION: 1.8 GY
RAD ONC ARIA PLAN PRIMARY REFERENCE POINT: NORMAL
RAD ONC ARIA PLAN TOTAL FRACTIONS PRESCRIBED: 28
RAD ONC ARIA PLAN TOTAL PRESCRIBED DOSE: 2000 CGY
RAD ONC ARIA PLAN TOTAL PRESCRIBED DOSE: 5040 CGY
RAD ONC ARIA PLAN TOTAL PRESCRIBED DOSE: 5040 CGY
RAD ONC ARIA REFERENCE POINT DOSAGE GIVEN TO DATE: 37.8 GY
RAD ONC ARIA REFERENCE POINT ID: NORMAL
RAD ONC ARIA REFERENCE POINT SESSION DOSAGE GIVEN: 1.8 GY

## 2023-10-30 PROCEDURE — 77412 RADIATION TX DELIVERY LVL 3: CPT | Performed by: RADIOLOGY

## 2023-10-31 ENCOUNTER — HOSPITAL ENCOUNTER (OUTPATIENT)
Dept: RADIATION ONCOLOGY | Facility: HOSPITAL | Age: 68
Discharge: HOME | End: 2023-10-31

## 2023-10-31 LAB
RAD ONC ARIA COURSE ID: NORMAL
RAD ONC ARIA COURSE LAST TREATMENT DATE: NORMAL
RAD ONC ARIA COURSE START DATE: NORMAL
RAD ONC ARIA COURSE TREATMENT ELAPSED DAYS: 29
RAD ONC ARIA FIRST TREATMENT DATE: NORMAL
RAD ONC ARIA PLAN FRACTIONS TREATED TO DATE: 22
RAD ONC ARIA PLAN ID: NORMAL
RAD ONC ARIA PLAN PRESCRIBED DOSE PER FRACTION: 0.71 GY
RAD ONC ARIA PLAN PRESCRIBED DOSE PER FRACTION: 1.8 GY
RAD ONC ARIA PLAN PRESCRIBED DOSE PER FRACTION: 1.8 GY
RAD ONC ARIA PLAN PRIMARY REFERENCE POINT: NORMAL
RAD ONC ARIA PLAN TOTAL FRACTIONS PRESCRIBED: 28
RAD ONC ARIA PLAN TOTAL PRESCRIBED DOSE: 2000 CGY
RAD ONC ARIA PLAN TOTAL PRESCRIBED DOSE: 5040 CGY
RAD ONC ARIA PLAN TOTAL PRESCRIBED DOSE: 5040 CGY
RAD ONC ARIA REFERENCE POINT DOSAGE GIVEN TO DATE: 39.6 GY
RAD ONC ARIA REFERENCE POINT ID: NORMAL
RAD ONC ARIA REFERENCE POINT SESSION DOSAGE GIVEN: 1.8 GY

## 2023-10-31 PROCEDURE — 77412 RADIATION TX DELIVERY LVL 3: CPT | Performed by: RADIOLOGY

## 2023-11-01 ENCOUNTER — HOSPITAL ENCOUNTER (OUTPATIENT)
Dept: RADIATION ONCOLOGY | Facility: HOSPITAL | Age: 68
Setting detail: RADIATION/ONCOLOGY SERIES
Discharge: HOME | End: 2023-11-01
Payer: MEDICARE

## 2023-11-01 ENCOUNTER — HOSPITAL ENCOUNTER (OUTPATIENT)
Dept: RADIATION ONCOLOGY | Facility: HOSPITAL | Age: 68
Setting detail: RADIATION/ONCOLOGY SERIES
End: 2023-11-01
Payer: MEDICARE

## 2023-11-01 LAB
RAD ONC ARIA COURSE ID: NORMAL
RAD ONC ARIA COURSE LAST TREATMENT DATE: NORMAL
RAD ONC ARIA COURSE START DATE: NORMAL
RAD ONC ARIA COURSE TREATMENT ELAPSED DAYS: 30
RAD ONC ARIA FIRST TREATMENT DATE: NORMAL
RAD ONC ARIA PLAN FRACTIONS TREATED TO DATE: 23
RAD ONC ARIA PLAN ID: NORMAL
RAD ONC ARIA PLAN PRESCRIBED DOSE PER FRACTION: 0.71 GY
RAD ONC ARIA PLAN PRESCRIBED DOSE PER FRACTION: 1.8 GY
RAD ONC ARIA PLAN PRESCRIBED DOSE PER FRACTION: 1.8 GY
RAD ONC ARIA PLAN PRIMARY REFERENCE POINT: NORMAL
RAD ONC ARIA PLAN TOTAL FRACTIONS PRESCRIBED: 28
RAD ONC ARIA PLAN TOTAL PRESCRIBED DOSE: 2000 CGY
RAD ONC ARIA PLAN TOTAL PRESCRIBED DOSE: 5040 CGY
RAD ONC ARIA PLAN TOTAL PRESCRIBED DOSE: 5040 CGY
RAD ONC ARIA REFERENCE POINT DOSAGE GIVEN TO DATE: 41.4 GY
RAD ONC ARIA REFERENCE POINT ID: NORMAL
RAD ONC ARIA REFERENCE POINT SESSION DOSAGE GIVEN: 1.8 GY

## 2023-11-01 PROCEDURE — 77412 RADIATION TX DELIVERY LVL 3: CPT | Performed by: RADIOLOGY

## 2023-11-02 ENCOUNTER — HOSPITAL ENCOUNTER (OUTPATIENT)
Dept: RADIATION ONCOLOGY | Facility: HOSPITAL | Age: 68
Setting detail: RADIATION/ONCOLOGY SERIES
Discharge: HOME | End: 2023-11-02
Payer: MEDICARE

## 2023-11-02 LAB
RAD ONC ARIA COURSE ID: NORMAL
RAD ONC ARIA COURSE LAST TREATMENT DATE: NORMAL
RAD ONC ARIA COURSE START DATE: NORMAL
RAD ONC ARIA COURSE TREATMENT ELAPSED DAYS: 31
RAD ONC ARIA FIRST TREATMENT DATE: NORMAL
RAD ONC ARIA PLAN FRACTIONS TREATED TO DATE: 1
RAD ONC ARIA PLAN ID: NORMAL
RAD ONC ARIA PLAN PRESCRIBED DOSE PER FRACTION: 2 GY
RAD ONC ARIA PLAN PRIMARY REFERENCE POINT: NORMAL
RAD ONC ARIA PLAN TOTAL FRACTIONS PRESCRIBED: 5
RAD ONC ARIA PLAN TOTAL PRESCRIBED DOSE: 1000 CGY
RAD ONC ARIA REFERENCE POINT DOSAGE GIVEN TO DATE: 2 GY
RAD ONC ARIA REFERENCE POINT DOSAGE GIVEN TO DATE: 2 GY
RAD ONC ARIA REFERENCE POINT ID: NORMAL
RAD ONC ARIA REFERENCE POINT ID: NORMAL
RAD ONC ARIA REFERENCE POINT SESSION DOSAGE GIVEN: 2 GY
RAD ONC ARIA REFERENCE POINT SESSION DOSAGE GIVEN: 2 GY

## 2023-11-02 PROCEDURE — 77336 RADIATION PHYSICS CONSULT: CPT | Performed by: RADIOLOGY

## 2023-11-02 PROCEDURE — 77332 RADIATION TREATMENT AID(S): CPT | Performed by: RADIOLOGY

## 2023-11-02 PROCEDURE — 77412 RADIATION TX DELIVERY LVL 3: CPT | Performed by: RADIOLOGY

## 2023-11-02 PROCEDURE — 77402 RADIATION TX DELIVERY LVL 1: CPT | Performed by: RADIOLOGY

## 2023-11-03 ENCOUNTER — HOSPITAL ENCOUNTER (OUTPATIENT)
Dept: RADIATION ONCOLOGY | Facility: HOSPITAL | Age: 68
Setting detail: RADIATION/ONCOLOGY SERIES
Discharge: HOME | End: 2023-11-03
Payer: MEDICARE

## 2023-11-03 LAB
RAD ONC ARIA COURSE ID: NORMAL
RAD ONC ARIA COURSE LAST TREATMENT DATE: NORMAL
RAD ONC ARIA COURSE START DATE: NORMAL
RAD ONC ARIA COURSE TREATMENT ELAPSED DAYS: 32
RAD ONC ARIA FIRST TREATMENT DATE: NORMAL
RAD ONC ARIA PLAN FRACTIONS TREATED TO DATE: 2
RAD ONC ARIA PLAN ID: NORMAL
RAD ONC ARIA PLAN PRESCRIBED DOSE PER FRACTION: 2 GY
RAD ONC ARIA PLAN PRIMARY REFERENCE POINT: NORMAL
RAD ONC ARIA PLAN TOTAL FRACTIONS PRESCRIBED: 5
RAD ONC ARIA PLAN TOTAL PRESCRIBED DOSE: 1000 CGY
RAD ONC ARIA REFERENCE POINT DOSAGE GIVEN TO DATE: 4 GY
RAD ONC ARIA REFERENCE POINT DOSAGE GIVEN TO DATE: 4 GY
RAD ONC ARIA REFERENCE POINT ID: NORMAL
RAD ONC ARIA REFERENCE POINT ID: NORMAL
RAD ONC ARIA REFERENCE POINT SESSION DOSAGE GIVEN: 2 GY
RAD ONC ARIA REFERENCE POINT SESSION DOSAGE GIVEN: 2 GY

## 2023-11-03 PROCEDURE — 77412 RADIATION TX DELIVERY LVL 3: CPT | Performed by: RADIOLOGY

## 2023-11-06 ENCOUNTER — HOSPITAL ENCOUNTER (OUTPATIENT)
Dept: RADIATION ONCOLOGY | Facility: HOSPITAL | Age: 68
Setting detail: RADIATION/ONCOLOGY SERIES
Discharge: HOME | End: 2023-11-06
Payer: MEDICARE

## 2023-11-06 LAB
RAD ONC ARIA COURSE ID: NORMAL
RAD ONC ARIA COURSE LAST TREATMENT DATE: NORMAL
RAD ONC ARIA COURSE START DATE: NORMAL
RAD ONC ARIA COURSE TREATMENT ELAPSED DAYS: 35
RAD ONC ARIA FIRST TREATMENT DATE: NORMAL
RAD ONC ARIA PLAN FRACTIONS TREATED TO DATE: 3
RAD ONC ARIA PLAN ID: NORMAL
RAD ONC ARIA PLAN PRESCRIBED DOSE PER FRACTION: 2 GY
RAD ONC ARIA PLAN PRIMARY REFERENCE POINT: NORMAL
RAD ONC ARIA PLAN TOTAL FRACTIONS PRESCRIBED: 5
RAD ONC ARIA PLAN TOTAL PRESCRIBED DOSE: 1000 CGY
RAD ONC ARIA REFERENCE POINT DOSAGE GIVEN TO DATE: 6 GY
RAD ONC ARIA REFERENCE POINT DOSAGE GIVEN TO DATE: 6 GY
RAD ONC ARIA REFERENCE POINT ID: NORMAL
RAD ONC ARIA REFERENCE POINT ID: NORMAL
RAD ONC ARIA REFERENCE POINT SESSION DOSAGE GIVEN: 2 GY
RAD ONC ARIA REFERENCE POINT SESSION DOSAGE GIVEN: 2 GY

## 2023-11-06 PROCEDURE — 77412 RADIATION TX DELIVERY LVL 3: CPT | Performed by: RADIOLOGY

## 2023-11-07 ENCOUNTER — HOSPITAL ENCOUNTER (OUTPATIENT)
Dept: RADIATION ONCOLOGY | Facility: HOSPITAL | Age: 68
Setting detail: RADIATION/ONCOLOGY SERIES
Discharge: HOME | End: 2023-11-07
Payer: MEDICARE

## 2023-11-07 LAB
RAD ONC ARIA COURSE ID: NORMAL
RAD ONC ARIA COURSE LAST TREATMENT DATE: NORMAL
RAD ONC ARIA COURSE START DATE: NORMAL
RAD ONC ARIA COURSE TREATMENT ELAPSED DAYS: 36
RAD ONC ARIA FIRST TREATMENT DATE: NORMAL
RAD ONC ARIA PLAN FRACTIONS TREATED TO DATE: 4
RAD ONC ARIA PLAN ID: NORMAL
RAD ONC ARIA PLAN PRESCRIBED DOSE PER FRACTION: 2 GY
RAD ONC ARIA PLAN PRIMARY REFERENCE POINT: NORMAL
RAD ONC ARIA PLAN TOTAL FRACTIONS PRESCRIBED: 5
RAD ONC ARIA PLAN TOTAL PRESCRIBED DOSE: 1000 CGY
RAD ONC ARIA REFERENCE POINT DOSAGE GIVEN TO DATE: 8 GY
RAD ONC ARIA REFERENCE POINT DOSAGE GIVEN TO DATE: 8 GY
RAD ONC ARIA REFERENCE POINT ID: NORMAL
RAD ONC ARIA REFERENCE POINT ID: NORMAL
RAD ONC ARIA REFERENCE POINT SESSION DOSAGE GIVEN: 2 GY
RAD ONC ARIA REFERENCE POINT SESSION DOSAGE GIVEN: 2 GY

## 2023-11-07 PROCEDURE — 77412 RADIATION TX DELIVERY LVL 3: CPT | Performed by: RADIOLOGY

## 2023-11-08 ENCOUNTER — HOSPITAL ENCOUNTER (OUTPATIENT)
Dept: RADIATION ONCOLOGY | Facility: HOSPITAL | Age: 68
Setting detail: RADIATION/ONCOLOGY SERIES
Discharge: HOME | End: 2023-11-08
Payer: MEDICARE

## 2023-11-08 LAB
RAD ONC ARIA COURSE ID: NORMAL
RAD ONC ARIA COURSE LAST TREATMENT DATE: NORMAL
RAD ONC ARIA COURSE START DATE: NORMAL
RAD ONC ARIA COURSE TREATMENT ELAPSED DAYS: 37
RAD ONC ARIA FIRST TREATMENT DATE: NORMAL
RAD ONC ARIA PLAN FRACTIONS TREATED TO DATE: 5
RAD ONC ARIA PLAN ID: NORMAL
RAD ONC ARIA PLAN PRESCRIBED DOSE PER FRACTION: 2 GY
RAD ONC ARIA PLAN PRIMARY REFERENCE POINT: NORMAL
RAD ONC ARIA PLAN TOTAL FRACTIONS PRESCRIBED: 5
RAD ONC ARIA PLAN TOTAL PRESCRIBED DOSE: 1000 CGY
RAD ONC ARIA REFERENCE POINT DOSAGE GIVEN TO DATE: 10 GY
RAD ONC ARIA REFERENCE POINT DOSAGE GIVEN TO DATE: 10 GY
RAD ONC ARIA REFERENCE POINT ID: NORMAL
RAD ONC ARIA REFERENCE POINT ID: NORMAL
RAD ONC ARIA REFERENCE POINT SESSION DOSAGE GIVEN: 2 GY
RAD ONC ARIA REFERENCE POINT SESSION DOSAGE GIVEN: 2 GY

## 2023-11-08 PROCEDURE — 77336 RADIATION PHYSICS CONSULT: CPT | Performed by: RADIOLOGY

## 2023-11-08 PROCEDURE — 77412 RADIATION TX DELIVERY LVL 3: CPT | Performed by: RADIOLOGY

## 2023-11-09 ENCOUNTER — HOSPITAL ENCOUNTER (OUTPATIENT)
Dept: RADIATION ONCOLOGY | Facility: HOSPITAL | Age: 68
Setting detail: RADIATION/ONCOLOGY SERIES
Discharge: HOME | End: 2023-11-09
Payer: MEDICARE

## 2023-11-09 LAB
RAD ONC ARIA COURSE ID: NORMAL
RAD ONC ARIA COURSE LAST TREATMENT DATE: NORMAL
RAD ONC ARIA COURSE START DATE: NORMAL
RAD ONC ARIA COURSE TREATMENT ELAPSED DAYS: 38
RAD ONC ARIA FIRST TREATMENT DATE: NORMAL
RAD ONC ARIA PLAN FRACTIONS TREATED TO DATE: 24
RAD ONC ARIA PLAN ID: NORMAL
RAD ONC ARIA PLAN PRESCRIBED DOSE PER FRACTION: 0.71 GY
RAD ONC ARIA PLAN PRESCRIBED DOSE PER FRACTION: 1.8 GY
RAD ONC ARIA PLAN PRESCRIBED DOSE PER FRACTION: 1.8 GY
RAD ONC ARIA PLAN PRIMARY REFERENCE POINT: NORMAL
RAD ONC ARIA PLAN TOTAL FRACTIONS PRESCRIBED: 28
RAD ONC ARIA PLAN TOTAL PRESCRIBED DOSE: 2000 CGY
RAD ONC ARIA PLAN TOTAL PRESCRIBED DOSE: 5040 CGY
RAD ONC ARIA PLAN TOTAL PRESCRIBED DOSE: 5040 CGY
RAD ONC ARIA REFERENCE POINT DOSAGE GIVEN TO DATE: 43.2 GY
RAD ONC ARIA REFERENCE POINT ID: NORMAL
RAD ONC ARIA REFERENCE POINT SESSION DOSAGE GIVEN: 1.8 GY

## 2023-11-09 PROCEDURE — 77412 RADIATION TX DELIVERY LVL 3: CPT | Performed by: RADIOLOGY

## 2023-11-09 PROCEDURE — 77417 THER RADIOLOGY PORT IMAGE(S): CPT | Performed by: RADIOLOGY

## 2023-11-10 ENCOUNTER — HOSPITAL ENCOUNTER (OUTPATIENT)
Dept: RADIATION ONCOLOGY | Facility: HOSPITAL | Age: 68
Discharge: HOME | End: 2023-11-10

## 2023-11-10 LAB
RAD ONC ARIA COURSE ID: NORMAL
RAD ONC ARIA COURSE LAST TREATMENT DATE: NORMAL
RAD ONC ARIA COURSE START DATE: NORMAL
RAD ONC ARIA COURSE TREATMENT ELAPSED DAYS: 39
RAD ONC ARIA FIRST TREATMENT DATE: NORMAL
RAD ONC ARIA PLAN FRACTIONS TREATED TO DATE: 25
RAD ONC ARIA PLAN ID: NORMAL
RAD ONC ARIA PLAN PRESCRIBED DOSE PER FRACTION: 0.71 GY
RAD ONC ARIA PLAN PRESCRIBED DOSE PER FRACTION: 1.8 GY
RAD ONC ARIA PLAN PRESCRIBED DOSE PER FRACTION: 1.8 GY
RAD ONC ARIA PLAN PRIMARY REFERENCE POINT: NORMAL
RAD ONC ARIA PLAN TOTAL FRACTIONS PRESCRIBED: 28
RAD ONC ARIA PLAN TOTAL PRESCRIBED DOSE: 2000 CGY
RAD ONC ARIA PLAN TOTAL PRESCRIBED DOSE: 5040 CGY
RAD ONC ARIA PLAN TOTAL PRESCRIBED DOSE: 5040 CGY
RAD ONC ARIA REFERENCE POINT DOSAGE GIVEN TO DATE: 45 GY
RAD ONC ARIA REFERENCE POINT ID: NORMAL
RAD ONC ARIA REFERENCE POINT SESSION DOSAGE GIVEN: 1.8 GY

## 2023-11-10 PROCEDURE — 77412 RADIATION TX DELIVERY LVL 3: CPT | Performed by: RADIOLOGY

## 2023-11-13 ENCOUNTER — HOSPITAL ENCOUNTER (OUTPATIENT)
Dept: RADIATION ONCOLOGY | Facility: HOSPITAL | Age: 68
Discharge: HOME | End: 2023-11-13

## 2023-11-13 LAB
RAD ONC ARIA COURSE ID: NORMAL
RAD ONC ARIA COURSE LAST TREATMENT DATE: NORMAL
RAD ONC ARIA COURSE START DATE: NORMAL
RAD ONC ARIA COURSE TREATMENT ELAPSED DAYS: 42
RAD ONC ARIA FIRST TREATMENT DATE: NORMAL
RAD ONC ARIA PLAN FRACTIONS TREATED TO DATE: 26
RAD ONC ARIA PLAN ID: NORMAL
RAD ONC ARIA PLAN PRESCRIBED DOSE PER FRACTION: 0.71 GY
RAD ONC ARIA PLAN PRESCRIBED DOSE PER FRACTION: 1.8 GY
RAD ONC ARIA PLAN PRESCRIBED DOSE PER FRACTION: 1.8 GY
RAD ONC ARIA PLAN PRIMARY REFERENCE POINT: NORMAL
RAD ONC ARIA PLAN TOTAL FRACTIONS PRESCRIBED: 28
RAD ONC ARIA PLAN TOTAL PRESCRIBED DOSE: 2000 CGY
RAD ONC ARIA PLAN TOTAL PRESCRIBED DOSE: 5040 CGY
RAD ONC ARIA PLAN TOTAL PRESCRIBED DOSE: 5040 CGY
RAD ONC ARIA REFERENCE POINT DOSAGE GIVEN TO DATE: 46.8 GY
RAD ONC ARIA REFERENCE POINT ID: NORMAL
RAD ONC ARIA REFERENCE POINT SESSION DOSAGE GIVEN: 1.8 GY

## 2023-11-13 PROCEDURE — 77412 RADIATION TX DELIVERY LVL 3: CPT | Performed by: RADIOLOGY

## 2023-11-14 ENCOUNTER — HOSPITAL ENCOUNTER (OUTPATIENT)
Dept: RADIATION ONCOLOGY | Facility: HOSPITAL | Age: 68
Setting detail: RADIATION/ONCOLOGY SERIES
Discharge: HOME | End: 2023-11-14
Payer: MEDICARE

## 2023-11-14 LAB
RAD ONC ARIA COURSE ID: NORMAL
RAD ONC ARIA COURSE LAST TREATMENT DATE: NORMAL
RAD ONC ARIA COURSE START DATE: NORMAL
RAD ONC ARIA COURSE TREATMENT ELAPSED DAYS: 43
RAD ONC ARIA FIRST TREATMENT DATE: NORMAL
RAD ONC ARIA PLAN FRACTIONS TREATED TO DATE: 27
RAD ONC ARIA PLAN ID: NORMAL
RAD ONC ARIA PLAN PRESCRIBED DOSE PER FRACTION: 0.71 GY
RAD ONC ARIA PLAN PRESCRIBED DOSE PER FRACTION: 1.8 GY
RAD ONC ARIA PLAN PRESCRIBED DOSE PER FRACTION: 1.8 GY
RAD ONC ARIA PLAN PRIMARY REFERENCE POINT: NORMAL
RAD ONC ARIA PLAN TOTAL FRACTIONS PRESCRIBED: 28
RAD ONC ARIA PLAN TOTAL PRESCRIBED DOSE: 2000 CGY
RAD ONC ARIA PLAN TOTAL PRESCRIBED DOSE: 5040 CGY
RAD ONC ARIA PLAN TOTAL PRESCRIBED DOSE: 5040 CGY
RAD ONC ARIA REFERENCE POINT DOSAGE GIVEN TO DATE: 48.6 GY
RAD ONC ARIA REFERENCE POINT ID: NORMAL
RAD ONC ARIA REFERENCE POINT SESSION DOSAGE GIVEN: 1.8 GY

## 2023-11-14 PROCEDURE — 77412 RADIATION TX DELIVERY LVL 3: CPT | Performed by: RADIOLOGY

## 2023-11-15 ENCOUNTER — HOSPITAL ENCOUNTER (OUTPATIENT)
Dept: RADIATION ONCOLOGY | Facility: HOSPITAL | Age: 68
Setting detail: RADIATION/ONCOLOGY SERIES
Discharge: HOME | End: 2023-11-15
Payer: MEDICARE

## 2023-11-15 LAB
RAD ONC ARIA COURSE ID: NORMAL
RAD ONC ARIA COURSE LAST TREATMENT DATE: NORMAL
RAD ONC ARIA COURSE START DATE: NORMAL
RAD ONC ARIA COURSE TREATMENT ELAPSED DAYS: 44
RAD ONC ARIA FIRST TREATMENT DATE: NORMAL
RAD ONC ARIA PLAN FRACTIONS TREATED TO DATE: 28
RAD ONC ARIA PLAN ID: NORMAL
RAD ONC ARIA PLAN PRESCRIBED DOSE PER FRACTION: 0.71 GY
RAD ONC ARIA PLAN PRESCRIBED DOSE PER FRACTION: 1.8 GY
RAD ONC ARIA PLAN PRESCRIBED DOSE PER FRACTION: 1.8 GY
RAD ONC ARIA PLAN PRIMARY REFERENCE POINT: NORMAL
RAD ONC ARIA PLAN TOTAL FRACTIONS PRESCRIBED: 28
RAD ONC ARIA PLAN TOTAL PRESCRIBED DOSE: 2000 CGY
RAD ONC ARIA PLAN TOTAL PRESCRIBED DOSE: 5040 CGY
RAD ONC ARIA PLAN TOTAL PRESCRIBED DOSE: 5040 CGY
RAD ONC ARIA REFERENCE POINT DOSAGE GIVEN TO DATE: 50.4 GY
RAD ONC ARIA REFERENCE POINT ID: NORMAL
RAD ONC ARIA REFERENCE POINT SESSION DOSAGE GIVEN: 1.8 GY

## 2023-11-15 PROCEDURE — 77412 RADIATION TX DELIVERY LVL 3: CPT | Performed by: RADIOLOGY

## 2023-11-15 PROCEDURE — 77336 RADIATION PHYSICS CONSULT: CPT | Performed by: RADIOLOGY

## 2023-12-22 ENCOUNTER — HOSPITAL ENCOUNTER (OUTPATIENT)
Dept: PREADMISSION TESTING | Age: 68
Discharge: HOME | End: 2023-12-26
Payer: MEDICARE

## 2023-12-22 VITALS — BODY MASS INDEX: 26.61 KG/M2 | WEIGHT: 175 LBS

## 2023-12-22 LAB
ANION GAP SERPL CALCULATED.3IONS-SCNC: 13 MMOL/L (ref 7–19)
BUN SERPL-MCNC: 12 MG/DL (ref 8–23)
CALCIUM SERPL-MCNC: 9.9 MG/DL (ref 8.8–10.2)
CHLORIDE SERPL-SCNC: 102 MMOL/L (ref 98–111)
CO2 SERPL-SCNC: 27 MMOL/L (ref 22–29)
CREAT SERPL-MCNC: 0.5 MG/DL (ref 0.5–0.9)
ERYTHROCYTE [DISTWIDTH] IN BLOOD BY AUTOMATED COUNT: 15.7 % (ref 11.5–14.5)
GLUCOSE SERPL-MCNC: 87 MG/DL (ref 74–109)
HCT VFR BLD AUTO: 38.8 % (ref 37–47)
HGB BLD-MCNC: 12 G/DL (ref 12–16)
MCH RBC QN AUTO: 23.6 PG (ref 27–31)
MCHC RBC AUTO-ENTMCNC: 30.9 G/DL (ref 33–37)
MCV RBC AUTO: 76.2 FL (ref 81–99)
PLATELET # BLD AUTO: 261 K/UL (ref 130–400)
PMV BLD AUTO: 9.9 FL (ref 9.4–12.3)
POTASSIUM SERPL-SCNC: 4.3 MMOL/L (ref 3.5–5)
RBC # BLD AUTO: 5.09 M/UL (ref 4.2–5.4)
SODIUM SERPL-SCNC: 142 MMOL/L (ref 136–145)
WBC # BLD AUTO: 4.7 K/UL (ref 4.8–10.8)

## 2023-12-22 PROCEDURE — 85027 COMPLETE CBC AUTOMATED: CPT

## 2023-12-22 PROCEDURE — 80048 BASIC METABOLIC PNL TOTAL CA: CPT

## 2023-12-22 RX ORDER — ACETAMINOPHEN 325 MG/1
975 TABLET ORAL ONCE
Status: CANCELLED | OUTPATIENT
Start: 2023-12-29

## 2023-12-22 RX ORDER — GABAPENTIN 300 MG/1
300 CAPSULE ORAL ONCE
Status: CANCELLED | OUTPATIENT
Start: 2023-12-29

## 2023-12-22 RX ORDER — CELECOXIB 200 MG/1
200 CAPSULE ORAL ONCE
Status: CANCELLED | OUTPATIENT
Start: 2023-12-29

## 2023-12-22 RX ORDER — ASPIRIN 81 MG/1
81 TABLET ORAL DAILY
COMMUNITY

## 2023-12-22 NOTE — DISCHARGE INSTRUCTIONS
The day before surgery you will receive a phone call from the surgery nurse to let you know what time to arrive on the day of surgery. This call will usually be between 2-4 PM. If you do not receive a phone call by 4 PM the day before your surgery please call [phone] and let them know you have not received an arrival time. If your surgery is on Monday, your call will be on the Friday before your Monday surgery. The morning of surgery, you may take all your prescribed medications with a sip of water. Any exceptions to this would be listed below:    Chlorhexidine Gluconate 4% Solution    Patient should shower with this soap  the night before surgery and the morning of surgery) washing from the neck down (avoiding contact with genitalia). DO  [facility] YOUR HAIR OR FACE WITH THIS SOAP. When washing with this soap, apply enough to suds up the body thoroughly, turn the water away from your body and allow the soap suds to remain on the body for 2 full minutes, then rinse body completely. After using this soap on the body, please do not apply powders or lotions to your body. PREOPERATIVE GUIDELINES WHEN RECEIVING ANESTHESIA    Do not eat or drink anything after midnight, the night before your surgery. No gum or candy the morning of surgery. This is extremely important for your safety. Take a bath (or shower) the night before your surgery and you may brush your teeth the morning of your surgery. You will be scheduled to arrive at the hospital 2 hours before your surgery, or follow your surgeon's instructions. Dress comfortably. Wear loose clothing that will be easy to remove and comfortable for your trip home. You may wear eyeglasses or contacts but bring your cases with you as they must be remove before your surgery. Hearing aids and dentures will need to be removed before your surgery. Do not wear any jewelry, including body jewelry.   All jewelry will need to be removed prior to your

## 2023-12-29 ENCOUNTER — HOSPITAL ENCOUNTER (OUTPATIENT)
Age: 68
Setting detail: OUTPATIENT SURGERY
Discharge: HOME | End: 2023-12-29
Attending: SURGERY | Admitting: SURGERY
Payer: MEDICARE

## 2023-12-29 ENCOUNTER — ANESTHESIA EVENT (OUTPATIENT)
Dept: OPERATING ROOM | Age: 68
End: 2023-12-29
Payer: MEDICARE

## 2023-12-29 ENCOUNTER — ANESTHESIA (OUTPATIENT)
Dept: OPERATING ROOM | Age: 68
End: 2023-12-29
Payer: MEDICARE

## 2023-12-29 VITALS
HEART RATE: 58 BPM | TEMPERATURE: 97.5 F | DIASTOLIC BLOOD PRESSURE: 48 MMHG | WEIGHT: 173 LBS | HEIGHT: 68 IN | SYSTOLIC BLOOD PRESSURE: 135 MMHG | OXYGEN SATURATION: 100 % | RESPIRATION RATE: 16 BRPM | BODY MASS INDEX: 26.22 KG/M2

## 2023-12-29 PROCEDURE — 3700000000 HC ANESTHESIA ATTENDED CARE: Performed by: SURGERY

## 2023-12-29 PROCEDURE — 7100000000 HC PACU RECOVERY - FIRST 15 MIN: Performed by: SURGERY

## 2023-12-29 PROCEDURE — 3700000001 HC ADD 15 MINUTES (ANESTHESIA): Performed by: SURGERY

## 2023-12-29 PROCEDURE — 3600000002 HC SURGERY LEVEL 2 BASE: Performed by: SURGERY

## 2023-12-29 PROCEDURE — 2709999900 HC NON-CHARGEABLE SUPPLY: Performed by: SURGERY

## 2023-12-29 PROCEDURE — 2580000003 HC RX 258: Performed by: NURSE ANESTHETIST, CERTIFIED REGISTERED

## 2023-12-29 PROCEDURE — 6360000002 HC RX W HCPCS: Performed by: NURSE ANESTHETIST, CERTIFIED REGISTERED

## 2023-12-29 PROCEDURE — 7100000011 HC PHASE II RECOVERY - ADDTL 15 MIN: Performed by: SURGERY

## 2023-12-29 PROCEDURE — 6370000000 HC RX 637 (ALT 250 FOR IP): Performed by: SURGERY

## 2023-12-29 PROCEDURE — 2580000003 HC RX 258: Performed by: ANESTHESIOLOGY

## 2023-12-29 PROCEDURE — 3600000012 HC SURGERY LEVEL 2 ADDTL 15MIN: Performed by: SURGERY

## 2023-12-29 PROCEDURE — 2500000003 HC RX 250 WO HCPCS: Performed by: NURSE ANESTHETIST, CERTIFIED REGISTERED

## 2023-12-29 PROCEDURE — 2580000003 HC RX 258: Performed by: SURGERY

## 2023-12-29 PROCEDURE — 6360000002 HC RX W HCPCS: Performed by: SURGERY

## 2023-12-29 PROCEDURE — 2500000003 HC RX 250 WO HCPCS: Performed by: SURGERY

## 2023-12-29 PROCEDURE — 36590 REMOVAL TUNNELED CV CATH: CPT | Performed by: SURGERY

## 2023-12-29 PROCEDURE — 7100000001 HC PACU RECOVERY - ADDTL 15 MIN: Performed by: SURGERY

## 2023-12-29 PROCEDURE — A4217 STERILE WATER/SALINE, 500 ML: HCPCS | Performed by: SURGERY

## 2023-12-29 PROCEDURE — 7100000010 HC PHASE II RECOVERY - FIRST 15 MIN: Performed by: SURGERY

## 2023-12-29 RX ORDER — HYDRALAZINE HYDROCHLORIDE 20 MG/ML
10 INJECTION INTRAMUSCULAR; INTRAVENOUS
Status: DISCONTINUED | OUTPATIENT
Start: 2023-12-29 | End: 2023-12-29 | Stop reason: HOSPADM

## 2023-12-29 RX ORDER — CELECOXIB 200 MG/1
200 CAPSULE ORAL ONCE
Status: COMPLETED | OUTPATIENT
Start: 2023-12-29 | End: 2023-12-29

## 2023-12-29 RX ORDER — LIDOCAINE HYDROCHLORIDE 10 MG/ML
INJECTION, SOLUTION EPIDURAL; INFILTRATION; INTRACAUDAL; PERINEURAL PRN
Status: DISCONTINUED | OUTPATIENT
Start: 2023-12-29 | End: 2023-12-29 | Stop reason: SDUPTHER

## 2023-12-29 RX ORDER — HYDROMORPHONE HYDROCHLORIDE 1 MG/ML
0.25 INJECTION, SOLUTION INTRAMUSCULAR; INTRAVENOUS; SUBCUTANEOUS EVERY 5 MIN PRN
Status: DISCONTINUED | OUTPATIENT
Start: 2023-12-29 | End: 2023-12-29 | Stop reason: HOSPADM

## 2023-12-29 RX ORDER — LABETALOL HYDROCHLORIDE 5 MG/ML
10 INJECTION, SOLUTION INTRAVENOUS
Status: DISCONTINUED | OUTPATIENT
Start: 2023-12-29 | End: 2023-12-29 | Stop reason: HOSPADM

## 2023-12-29 RX ORDER — SODIUM CHLORIDE 0.9 % (FLUSH) 0.9 %
5-40 SYRINGE (ML) INJECTION EVERY 12 HOURS SCHEDULED
Status: DISCONTINUED | OUTPATIENT
Start: 2023-12-29 | End: 2023-12-29 | Stop reason: HOSPADM

## 2023-12-29 RX ORDER — IPRATROPIUM BROMIDE AND ALBUTEROL SULFATE 2.5; .5 MG/3ML; MG/3ML
1 SOLUTION RESPIRATORY (INHALATION)
Status: DISCONTINUED | OUTPATIENT
Start: 2023-12-29 | End: 2023-12-29 | Stop reason: HOSPADM

## 2023-12-29 RX ORDER — SODIUM CHLORIDE, SODIUM LACTATE, POTASSIUM CHLORIDE, CALCIUM CHLORIDE 600; 310; 30; 20 MG/100ML; MG/100ML; MG/100ML; MG/100ML
INJECTION, SOLUTION INTRAVENOUS CONTINUOUS PRN
Status: DISCONTINUED | OUTPATIENT
Start: 2023-12-29 | End: 2023-12-29 | Stop reason: SDUPTHER

## 2023-12-29 RX ORDER — HYDROMORPHONE HYDROCHLORIDE 1 MG/ML
0.5 INJECTION, SOLUTION INTRAMUSCULAR; INTRAVENOUS; SUBCUTANEOUS EVERY 5 MIN PRN
Status: DISCONTINUED | OUTPATIENT
Start: 2023-12-29 | End: 2023-12-29 | Stop reason: HOSPADM

## 2023-12-29 RX ORDER — METOCLOPRAMIDE HYDROCHLORIDE 5 MG/ML
10 INJECTION INTRAMUSCULAR; INTRAVENOUS
Status: DISCONTINUED | OUTPATIENT
Start: 2023-12-29 | End: 2023-12-29 | Stop reason: HOSPADM

## 2023-12-29 RX ORDER — SODIUM CHLORIDE 0.9 % (FLUSH) 0.9 %
5-40 SYRINGE (ML) INJECTION PRN
Status: DISCONTINUED | OUTPATIENT
Start: 2023-12-29 | End: 2023-12-29 | Stop reason: HOSPADM

## 2023-12-29 RX ORDER — DIPHENHYDRAMINE HYDROCHLORIDE 50 MG/ML
12.5 INJECTION INTRAMUSCULAR; INTRAVENOUS
Status: DISCONTINUED | OUTPATIENT
Start: 2023-12-29 | End: 2023-12-29 | Stop reason: HOSPADM

## 2023-12-29 RX ORDER — ACETAMINOPHEN 325 MG/1
975 TABLET ORAL ONCE
Status: COMPLETED | OUTPATIENT
Start: 2023-12-29 | End: 2023-12-29

## 2023-12-29 RX ORDER — SODIUM CHLORIDE, SODIUM LACTATE, POTASSIUM CHLORIDE, CALCIUM CHLORIDE 600; 310; 30; 20 MG/100ML; MG/100ML; MG/100ML; MG/100ML
INJECTION, SOLUTION INTRAVENOUS CONTINUOUS
Status: DISCONTINUED | OUTPATIENT
Start: 2023-12-29 | End: 2023-12-29 | Stop reason: HOSPADM

## 2023-12-29 RX ORDER — PROPOFOL 10 MG/ML
INJECTION, EMULSION INTRAVENOUS PRN
Status: DISCONTINUED | OUTPATIENT
Start: 2023-12-29 | End: 2023-12-29 | Stop reason: SDUPTHER

## 2023-12-29 RX ORDER — SODIUM CHLORIDE 9 MG/ML
INJECTION, SOLUTION INTRAVENOUS PRN
Status: DISCONTINUED | OUTPATIENT
Start: 2023-12-29 | End: 2023-12-29 | Stop reason: HOSPADM

## 2023-12-29 RX ADMIN — CELECOXIB 200 MG: 200 CAPSULE ORAL at 09:23

## 2023-12-29 RX ADMIN — LIDOCAINE HYDROCHLORIDE 50 MG: 10 INJECTION, SOLUTION EPIDURAL; INFILTRATION; INTRACAUDAL; PERINEURAL at 13:03

## 2023-12-29 RX ADMIN — ACETAMINOPHEN 975 MG: 325 TABLET ORAL at 09:23

## 2023-12-29 RX ADMIN — SODIUM CHLORIDE, POTASSIUM CHLORIDE, SODIUM LACTATE AND CALCIUM CHLORIDE: 600; 310; 30; 20 INJECTION, SOLUTION INTRAVENOUS at 09:22

## 2023-12-29 RX ADMIN — SODIUM CHLORIDE, SODIUM LACTATE, POTASSIUM CHLORIDE, AND CALCIUM CHLORIDE: 600; 310; 30; 20 INJECTION, SOLUTION INTRAVENOUS at 12:58

## 2023-12-29 RX ADMIN — PROPOFOL 160 MCG/KG/MIN: 10 INJECTION, EMULSION INTRAVENOUS at 13:04

## 2023-12-29 RX ADMIN — PROPOFOL 70 MG: 10 INJECTION, EMULSION INTRAVENOUS at 13:03

## 2023-12-29 ASSESSMENT — PAIN - FUNCTIONAL ASSESSMENT
PAIN_FUNCTIONAL_ASSESSMENT: NONE - DENIES PAIN
PAIN_FUNCTIONAL_ASSESSMENT: NONE - DENIES PAIN

## 2023-12-29 NOTE — ANESTHESIA POSTPROCEDURE EVALUATION
Department of Anesthesiology  Postprocedure Note    Patient: Mahesh Castillo  MRN: [id number]  YOB: 1955  Date of evaluation: 12/29/2023    Procedure Summary       Date: 12/29/23 Room / Location: [facility] OR  / [facility]    Anesthesia Start: 2426 Anesthesia Stop: 5096    Procedure: PORT REMOVAL Diagnosis:       Malignant neoplasm of upper-inner quadrant of right female breast, unspecified estrogen receptor status ([address])      Malignant neoplasm of lower-inner quadrant of right female breast, unspecified estrogen receptor status ([address])      (Malignant neoplasm of upper-inner quadrant of right female breast, unspecified estrogen receptor status ([address]) [C50.211])      (Malignant neoplasm of lower-inner quadrant of right female breast, unspecified estrogen receptor status ([address]) Misbah Seymour)    Surgeons: Destiney Segovia MD Responsible Provider: RUTH Ambrose CRNA    Anesthesia Type: MAC ASA Status: 2            Anesthesia Type: No value filed. Elder Phase I: Elder Score: 9    Elder Phase II:      Anesthesia Post Evaluation    Patient location during evaluation: PACU  Patient participation: complete - patient participated  Level of consciousness: sleepy but conscious  Pain score: 0  Airway patency: patent  Nausea & Vomiting: no nausea and no vomiting  Cardiovascular status: blood pressure returned to baseline  Respiratory status: acceptable  Pain management: adequate        No notable events documented.

## 2023-12-29 NOTE — H&P
HISTORY OF PRESENT ILLNESS:     Ms. Ileana Dias presents today for a breast exam following bilateral simple mastectomy with right sentinel lymph node biopsy on 8/15/2023     She has finished radiation therapy      She is originally status post ultrasound guided breast biopsy on the right x's 2 areas. At 1:00 revealed a 1.6  cm high grade invasive ductal carcinoma. At 4:00 revealed a 1 cm intermediate grade invasive ductal mammary carcinoma. (1:00) ER positive at 99%. WI negative. Her2 positive. Ki67 55%. (4:00) ER positive at 96%. WI positive at 100%. Her2 positive. Ki67 14%. FISH Negative for both. Her repeat markers showed ER positive at 80%. WI positive at 3%. HER2 is equivocal 2+ and Ki-67 is 2%. Ultrasound-guided FNA of right axillary node was negative for malignancy     Mammaprint is High Risk Luminal B-Type      MRI-7/25/2023  FINDINGS:  Amount of Fibroglandular Tissue: Heterogeneous fibroglandular tissue. Background Parenchymal Enhancement:  Mild. Right breast: On prior exam there are 3 areas of abnormal kinetics  described in the right breast. The lesion in the upper inner quadrant  of the right breast does not demonstrate abnormal kinetics. The lesion in the upper outer right breast does not demonstrate  abnormal kinetics. The lesion in the medial proximal third of the right breast  demonstrates persistent abnormal kinetics. This is in the right inner  quadrant of the left breast 5:00 position 1.7 x 0.9 x 1.6 cm. Curve  peak is 80%. This is a biopsy-proven malignancy. Left breast: Heterogeneous fibroglandular tissue is present in the  left breast with no focal regions of architectural distortion or  abnormal kinetics. Nodes: A suspicious node in the right axilla is again noted. Appearance is concerning for willy disease and similar to prior exam  of January 12, 2023  Partially visualized chest/abdomen: Grossly unremarkable  Bones: No suspicious bony findings  IMPRESSION:  1.  Persistent area

## 2023-12-29 NOTE — DISCHARGE INSTRUCTIONS
1. You may shower in 48 hours. 2. If a small are of the wound separates or becomes red and inflamed, call the office to make an appointment. 3. Pain medication may cause constipation. You may take the laxative of your choice. 4. Call the office if you have any further questions during regular hours, mon-fri 9:00-4:00. In case of emergency the answering service will take your call.

## 2023-12-29 NOTE — BRIEF OP NOTE
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 SPECIMENS: * No specimens in log *    DRAINS: None    The patient tolerated the procedure well.     Electronically signed by Nida Alfaro MD  on 12/29/2023 at 1:30 PM

## 2024-01-01 ENCOUNTER — APPOINTMENT (OUTPATIENT)
Dept: RADIATION ONCOLOGY | Facility: HOSPITAL | Age: 69
End: 2024-01-01
Payer: MEDICARE

## 2024-01-01 NOTE — OP NOTE
[address] [address]                                OPERATIVE REPORT    PATIENT NAME: EDY SAXENA                     :        1955  MED REC NO:   [id number]                              ROOM:  ACCOUNT NO:   [id number]                           ADMIT DATE: 2023  PROVIDER:     Brandon Marrero MD    DATE OF PROCEDURE:  2023    PREOPERATIVE DIAGNOSIS:  Breast cancer status post chemotherapy.    POSTOPERATIVE DIAGNOSIS:  Breast cancer status post chemotherapy.    PROCEDURE:  Removal of LifePort catheter.    SURGEON:  Brandon Marrero MD    ASSISTANT:  Kenneth Suggs PA-C    ANESTHESIA  Local MAC.    INDICATIONS:  The patient is a 68-year-old lady who has completed her  chemotherapy for breast cancer.  She had an excellent response.    OPERATIVE PROCEDURE:  Today, she was brought to the operating room,  adequately sedated, and prepped and draped in the sterile fashion.  The  area around the port was anesthetized with 1/16% Xylocaine.  We then  opened the old transverse incision and dissected down to the catheter.   We then placed a 3-0 Vicryl pursestring around the catheter insertion  site, withdrew the catheter and tied down our pursestring.  We then  excised the port with its capsule.  We irrigated copiously, obtained  good hemostasis, closed with 3-0 Vicryl for the subcutaneous and 4-0  Stratafix for the skin.  A Dermabond dressing was applied.  Estimated  blood loss, minimal.  Complication, none.  She tolerated the procedure  well.        BRANDON MARRERO MD    D: 2023 10:37:02      T: 2023 10:39:23     [email or web address]  Job#: [id number]     Doc#: [id number]    CC:

## 2024-01-02 ENCOUNTER — HOSPITAL ENCOUNTER (OUTPATIENT)
Dept: RADIATION ONCOLOGY | Facility: HOSPITAL | Age: 69
Setting detail: RADIATION/ONCOLOGY SERIES
End: 2024-01-02
Payer: MEDICARE

## 2024-01-02 PROBLEM — Z92.3 HISTORY OF RADIATION THERAPY: Status: ACTIVE | Noted: 2024-01-02

## 2024-01-02 NOTE — PROGRESS NOTES
[facility], CourtneySREGINALDO Tabor MD      Alex Yadav APRN  _______________________________________________  [facility]  Department of Radiation Oncology  [address] [place] [id number]  Office: [phone]  Fax: [phone]    DATE:  01/03/2024  PATIENT: Karen Tipton 1955                         MEDICAL RECORD #:  [id number]                                                       Reason for Visit:   Chief Complaint   Patient presents with    Breast Cancer     Karen Tipton is a very pleasant 68 y.o. patient that has completed radiation therapy for carcinoma of the breast and returns to the clinic today for inital follow up exam. Denies activity change, appetite change, unexpected weight change, nasuea/vomiting, diarrhea, light-headedness, weakness, and headaches. She continues to follow  and .    HISTORY OF PRESENT ILLNESS  Diagnosed with Stage IIIA (T3, N1, cM0) ER+ IDC of right breast. She completed 6040 cGy in 33 fractions to the right chest wall 1/15/2023.    01/14/2019 - Right breast, 11:00, core biopsies:   Benign fibroadenomatoid changes with associated microcalcifications.     12/08/2022 - Bilateral Diagnostic Mammogram/Right Breast Ultrasound due to palpable right breast mass:  Right breast, BI-RADS 5, palpable mass is highly suggestive of a breast malignancy. Recommendation is for percutaneous core needle biopsy.   Immediately adjacent second satellite mass is also highly suggestive of a breast malignancy. Recommendation is for appropriate action.   Additional third mass in a separate quadrant at 4:00 is highly suggestive of a breast malignancy. Recommendation is for percutaneous core needle biopsy.   At least 2 abnormal axillary lymph nodes are suspicious for metastatic involvement. Recommendation is for fine needle aspiration versus nonvacuum percutaneous biopsy per performing physician preference.   Given the constellation of  findings, there is high suggestion of multicentric malignancy. Given the multiple findings recommendation would be for a biopsy of the main palpable mass as well as an additional biopsy of the mass at 4:00 in a separate quadrant to prove multicentric malignancy.   Additional recommendation as described above would be for testing of the abnormal axillary lymph nodes. This was all discussed in detail with the patient. She will be seen by Annamaria immediately following this appointment and Annamaria is aware.   Overall assessment, BI-RADS 5, highly suggestive of malignancy with recommendation for biopsies.    12/19/2022 - Right breast biopsy:  Right breast, 1:00, core biopsies:   Invasive carcinoma of no special type (ductal), grade 3.   Largest contiguous area of tumor measures 16 mm.   ER +, MA -, Her/2 2+ equivocal, FISH negative  Luminal B/high risk  Right breast, 4:00, core biopsies:   Invasive carcinoma of no special type (ductal), grade 2.   Largest contiguous area of tumor measures 10 mm.   ER +, MA +, Her 2/ 2+ equivocal, FISH negative  Luminal B/high risk    01/11/2023 - MRI Bilateral Breasts with and without contrast:  RIGHT breast upper inner quadrant dominant mass, consistent with biopsy proven malignancy. There is nonmass enhancement extending to the medial skin, which appears to have subtle thickening, concerning for invasion.   RIGHT breast lower inner quadrant 4:00 position mass, consistent with biopsy proven malignancy. Note that the posterior aspect of the mass is 0.4 cm from the chest wall.   RIGHT breast upper outer quadrant with a 0.8 cm circumscribed mass, concerning for satellite disease.   Overall appearance of the RIGHT breast concerning for multicentric disease.   Suspicious low-lying RIGHT axillary lymph node as above. Although this was negative by fine-needle aspiration, the imaging appearance remains suspicious.   No internal mammary or LEFT axillary adenopathy.   LEFT 3 cm thyroid nodule, not  optimally evaluated by MRI. If not previously performed, consider nonemergent thyroid ultrasound.   BI-RADS Final Assessment Category 6: Known Biopsy-Proven Malignancy   Management Recommendation: Surgical excision when clinically appropriate.     01/18/2023 - Genetic Testing:  Negative for 81 out of 81 genes.   A variant of uncertain significance (VUS) was detected in the BRIP1 gene(s). A VUS means that a change in the DNA was detected, but there is not enough information to determine whether or not the change increases the risk of cancer. The [employer] ([employer]MG) states that VUS should NOT be used in clinical decision making.   Please see below for additional findings.   VUS:   Gene: BRIP1 , Variant: c.205G>A (p.G69R) Note: A heterozygous variant of uncertain significance (VUS) was detected in the BRIP1 gene as tabulated above.     01/20/2023 - CT Abdomen/Pelvis with contrast:  Small sliding hiatal hernia is seen. Mild thickening versus an element of underdistension is seen in the cecum and ascending colon. This could reflect an element of colitis due to infectious / inflammatory process.   No metastatic lesions are seen. No ascites.     01/20/2023 - CT Chest with contrast:  Multiple small subpleural nodules in the right upper lung field of indeterminate significance. Scattered borderline nonspecific intrathoracic adenopathy, as above. Suggest a follow-up chest CT in 3 months, and/or comparison with priors, if clinically appropriate.   Lobulated mass lesions with punctate calcifications in the right breast. These are consistent with known malignant neoplasm of the right breast. Consider correlation with dedicated ultrasound and mammography, if clinically appropriate.   A heterogenous hypodense nodule in the left lobe of the thyroid gland, and cannot exclude a small right thyroid nodule, as above, for which correlation with ultrasound is recommended.     01/20/2023 - Bone  Scan:  There is no evidence of osseous metastatic disease.     01/25/2023 - Consult with :  Essentially, clinical T3 N1 M0 disease. I recommend neoadjuvant chemotherapy.   Treatment consent signed for dose dense Adriamycin Cyclophosphamide + GCSF every 2 weeks x 4 cycles followed by weekly Taxol x 12 weeks     02/15/2023 - 06/09/2023 - Chemotherapy course:  Adriamycin/Cyclophosphamide every 2 weeks x 4, followed by weekly Taxol x 9  Chemo stopped due to development of neuropathy. 3 remaining cycles of Taxol omitted.    05/09/2023 - Right Breast Ultrasound:  Right breast, BI-RADS 5, highly suggestive of malignancy involving the non biopsied mass at 1:00 4 cm from the nipple. This shows interim decrease in size most consistent with response to neoadjuvant chemotherapy. As this has not been biopsied, the finding remains highly suggestive of a malignancy. Recommend biopsy, clip placement, or management based on surgical plan.   Biopsy-proven malignant mass at 1:00 5 cm from the nipple has decreased in size, estimated to have decreased by maybe 50%.   Biopsy-proven malignant mass at 4:00 11 cm from the nipple has only slightly decreased in size from approximately 13 mm to 10 mm.   The right axilla was not imaged with today's ultrasound, please refer to prior prebiopsy ultrasound and MRI and fine-needle aspiration performed on December 19, 2022. Appropriate management of the axilla should be based on current surgical management standards for staging of the axilla with an ipsilateral breast malignancy.   Recommend appropriate action based on plans for clinical and surgical management as described above.   Overall assessment BI-RADS 5, highly suggestive of malignancy.     06/09/2023 -  Hormonal Therapy:  Letrozole    07/21/2023 - CT Chest with contrast:  Decreased size of right breast mass.   Stable pulmonary nodules.   No evidence of new or worsening disease.     07/24/2023 - MRI Bilateral Breasts with and  without contrast:  Persistent area of abnormal kinetics in the inferior medial right breast. This correlates with the biopsy-proven malignancy.   The upper outer and upper inner right breast known biopsy-proven malignancies  do not demonstrate abnormal kinetics at this time.   Suspicious right axillary lymph node as above unchanged.   BI-RADS Final Assessment Category 6: Known Biopsy-Proven Malignancy   Management Recommendation: Surgical excision when clinically appropriate.     07/24/2023 - Right Diagnostic Mammogram/Ultrasound:  Known biopsy-proven breast cancer. BI-RADS 6.   RECOMMENDATION: Surgical follow-up.     08/15/2023 -  Bilateral mastectomies and right sentinel lymph node biopsy per :  Breast, right mastectomy:   Infiltrating ductal carcinoma, no special type, grade 1.   Residual invasive carcinoma measures 1.1 cm in greatest dimension.   Invasive carcinoma is located 0.6 cm from the nearest deep surgical excision margin.   Low-grade ductal carcinoma in situ is identified in conjunction with the invasive lesion.   In situ carcinoma is located greater than 1.0 cm from the nearest deep surgical excision margin.   Sections of nipple, negative for evidence of malignancy.   Sections of skin, negative for evidence of malignancy.   Changes consistent with fibrocystic mastopathy as well as a small benign intraductal papilloma are identified in the nonneoplastic breast parenchyma.   Breast, left mastectomy:   Benign breast parenchyma with changes consistent with fibrocystic mastopathy.   Sections of nipple, negative for evidence of malignancy.   Sections of skin, negative for evidence of malignancy.   Lymph node, right sentinel lymph node biopsy:   1 lymph node, negative for evidence of malignancy.   Breast, excision of right breast advancement flap:   Benign skin and subcutaneous tissue.   Breast, excision of left breast advancement flap:   Benign skin and subcutaneous tissue.   AJCC STAGE: ypT1c,  (sn)pN0, pMx     08/24/2023 - Appointment with :  Clinical T3 N0 M0->ypT1N0. Stage IIIa invasive ductal carcinoma, 1:00 right breast, Grade 3, ER 99%, NV 0, HER-2 2+/equivocal,  FISH-negative, Ki67 55%, MammaPrint: Luminal B/high risk, Dec 2022. Invasive ductal carcinoma, 4:00 right breast, Grade 2, ER 96%, %, HER-2 2+/equivocal, FISH-negative, Ki67 15%, MammaPrint: Luminal B/high risk, Dec 2022  -Essentially, locally advanced disease.  -S/p neoadjuvant chemo: ddAC x 4 followed by Taxol x 9 weekly cycles  -8/15/23 Bilateral breast mastectomy: AJCC STAGE: ypT1c, (sn)pN0, M0  Plan:  -Continue adjuvant Letrozole x 5 years, at least consider breast cancer index at year 5  -Referral Dr Stephane Tabor opinion adjuvant RT  PLAN:  RTC with MD 4 month for surveillance follow-up  CBC today  Recommend Bone Density to monitor for endocrine therapy  Refer to  at [facility] for radiation therapy  Femara 2.5 mg PO daily to be started in 2 weeks-script sent  Continue follow-up with YANNICK Leavitt/[facility] ENT f  Continue follow-up with /[facility]  Ok to remove port  Return in about 4 months (around 12/24/2023) for CBC, Appointment with Dr. Barba.  Sched Bone Density  Refer to  at [facility] for radiation therapy    08/30/2023 - Appointment with :  PLAN: Follow-up in 2 to 3 weeks for physical exam     09/13/2023 - Consult with :  We have discussed the role of radiation therapy with this diagnosis as well as the indications and rationale of adjuvant radiation therapy according to the NCCN Guidelines. She has verbalized understanding of our conversation and agrees to the treatment recommendations for postoperative radiation therapy to the right chest wall and involved lymph nodes. following Simple Mastectomy. I anticipate a dose of 5040 cGy with 1000 cGy boost to the tumor bed for a total of 6040 cGy/33 fractions.. We will simulate treatment fields to begin the treatment planning,  final dose to be determined.  Discussed post surgery and radiation risk for lymphedema, referral will be placed to lymphedema clinic for initial evaluation/education, she will continue ongoing management per primary care physician and other specialists. Thank you for allowing me to assist in her care.     10/02/2023 - 11/15/2023 - Completed radiation course:  Received 6040 cGy in 33 fractions to the right chest wall via External Beam Radiation - EBRT.     12/18/2023 - Appointment with :  PLAN: I will see her back in 6 month for exam. Try and schedule her for port removal before the end of the year.      History obtained from  PATIENT and CHART    PAST MEDICAL HISTORY  Past Medical History:   Diagnosis Date    Colon polyp     Hypertension       PAST SURGICAL HISTORY  Past Surgical History:   Procedure Laterality Date    BREAST BIOPSY      COLONOSCOPY  06/10/2014    3 polyps destroyed    COLONOSCOPY N/A 8/5/2019    Procedure: COLONOSCOPY WITH ANESTHESIA;  Surgeon: Ismael Ramachandran MD;  Location: [facility] ENDOSCOPY;  Service: Gastroenterology      FAMILY HISTORY  family history includes Cancer in her father; Colon cancer in her paternal grandfather; Dementia in her mother; Stroke in her mother.    SOCIAL HISTORY  Social History     Tobacco Use    Smoking status: Never    Smokeless tobacco: Never   Vaping Use    Vaping Use: Never used   Substance Use Topics    Alcohol use: Yes     Comment: rare    Drug use: No      ALLERGIES  Patient has no known allergies.     MEDICATIONS  Current Outpatient Medications   Medication Sig Dispense Refill    letrozole (FEMARA) 2.5 MG tablet Take 1 tablet by mouth Daily.      lisinopril-hydrochlorothiazide (PRINZIDE,ZESTORETIC) 10-12.5 MG per tablet Take 1 tablet by mouth once daily 90 tablet 0    montelukast (SINGULAIR) 10 MG tablet Take 1 tablet by mouth Every Night.       Current Facility-Administered Medications   Medication Dose Route Frequency Provider Last Rate Last Admin  "   lidocaine (XYLOCAINE) 1 % injection 5 mL  5 mL Infiltration Once [place], Michael HAMILTON MD          The following portions of the patient's history were reviewed and updated as appropriate: allergies, current medications, past family history, past medical history, past social history, past surgical history and problem list.    REVIEW OF SYSTEMS  Review of Systems   Constitutional: Negative.  Negative for activity change, fatigue and unexpected weight change.   HENT: Negative.     Respiratory: Negative.  Negative for cough and shortness of breath.    Cardiovascular: Negative.  Negative for chest pain.   Gastrointestinal: Negative.    Genitourinary: Negative.    Musculoskeletal: Negative.    Skin: Negative.    Neurological: Negative.  Negative for dizziness and weakness.   Hematological:  Negative for adenopathy.   Psychiatric/Behavioral: Negative.     All other systems reviewed and are negative.      I have reviewed and confirmed the accuracy of the ROS as documented by the MA/LPN/RN Stephane Tabor III, MD    PHYSICAL EXAM  VITAL SIGNS:   Vitals:    01/03/24 1302   BP: 144/58   Pulse: 89   SpO2: 97%   Weight: 80.2 kg (176 lb 14.4 oz)   Height: 172.7 cm (68\")   PainSc: 0-No pain        Physical Exam  Vitals reviewed.   Constitutional:       Appearance: Normal appearance.   HENT:      Head: Normocephalic.   Cardiovascular:      Rate and Rhythm: Normal rate and regular rhythm.      Pulses: Normal pulses.      Heart sounds: Normal heart sounds.   Pulmonary:      Effort: Pulmonary effort is normal.      Breath sounds: Normal breath sounds.   Chest:   Breasts:     Right: Absent.      Left: Absent.      Comments: Breasts s/p bilateral mastectomies.  Right chest wall s/p radiation therapy, well-healed; no palpable masses.  Abdominal:      General: Bowel sounds are normal.   Musculoskeletal:      Cervical back: Normal range of motion and neck supple.   Lymphadenopathy:      Cervical: No cervical adenopathy.      Upper " Body:      Right upper body: No supraclavicular, axillary or pectoral adenopathy.      Left upper body: No supraclavicular, axillary or pectoral adenopathy.   Skin:     General: Skin is warm.      Capillary Refill: Capillary refill takes less than 2 seconds.   Neurological:      General: No focal deficit present.      Mental Status: She is alert and oriented to person, place, and time.   Psychiatric:         Mood and Affect: Mood normal.         Behavior: Behavior normal.       Performance Status: ECOG (0) Fully active, able to carry on all predisease performance without restriction    Clinical Quality Measures  -Pain Documented by Standardized Tool, FPS Karen Tipton reports a pain score of 0.  Given her pain assessment as noted, treatment options were discussed and the following options were decided upon as a follow-up plan to address the patient's pain:  No pain, no plan given .     Body Mass Index Screening and Follow-Up Plan Body mass index is 26.9 kg/m².    Tobacco Use: Screening and Cessation Intervention  Social History    Tobacco Use      Smoking status: Never      Smokeless tobacco: Never    Advanced Care Planning Advance Care Planning    ACP discussion was held with the patient during this visit. Patient has an advance directive in EMR which is still valid.     ASSESSMENT AND PLAN  1. Malignant neoplasm of right female breast, unspecified estrogen receptor status, unspecified site of breast    2. S/P bilateral mastectomy    3. S/P lymph node biopsy    4. History of radiation therapy    5. Non-smoker      No orders of the defined types were placed in this encounter.    Recommendations:  Karen Tipton is status post completion of adjuvant radiation therapy to the right chest wall and presents to our clinic today for follow up exam. Diagnosed with Stage IIIA (T3, N1, cM0) ER+ IDC of right breast. She completed 6040 cGy in 33 fractions to the right chest wall 1/15/2023.    We discussed expected post  radiation long and short term effects, monthly self exams and NCCN surveillance recommendations. She is doing well and has no evidence of recurrent or metastatic disease today. Continue ongoing management with other physicians, will follow up with us in 1 year or sooner if needed.    Patient Instructions   1) Return in 1 year    Time Spent: I spent 32 minutes caring for Karen on this date of service. This time includes time spent by me in the following activities: preparing for the visit, reviewing tests, obtaining and/or reviewing a separately obtained history, performing a medically appropriate examination and/or evaluation, counseling and educating the patient/family/caregiver, ordering medications, tests, or procedures, referring and communicating with other health care professionals, documenting information in the medical record, independently interpreting results and communicating that information with the patient/family/caregiver, and care coordination.   Stephane Tabor III, MD  01/03/2024

## 2024-01-03 ENCOUNTER — OFFICE VISIT (OUTPATIENT)
Dept: RADIATION ONCOLOGY | Facility: HOSPITAL | Age: 69
End: 2024-01-03
Payer: MEDICARE

## 2024-01-03 VITALS
HEIGHT: 68 IN | SYSTOLIC BLOOD PRESSURE: 144 MMHG | WEIGHT: 176.9 LBS | DIASTOLIC BLOOD PRESSURE: 58 MMHG | OXYGEN SATURATION: 97 % | HEART RATE: 89 BPM | BODY MASS INDEX: 26.81 KG/M2

## 2024-01-03 DIAGNOSIS — Z78.9 NON-SMOKER: ICD-10-CM

## 2024-01-03 DIAGNOSIS — Z90.13 S/P BILATERAL MASTECTOMY: ICD-10-CM

## 2024-01-03 DIAGNOSIS — Z92.3 HISTORY OF RADIATION THERAPY: ICD-10-CM

## 2024-01-03 DIAGNOSIS — Z98.890 S/P LYMPH NODE BIOPSY: ICD-10-CM

## 2024-01-03 DIAGNOSIS — C50.911 MALIGNANT NEOPLASM OF RIGHT FEMALE BREAST, UNSPECIFIED ESTROGEN RECEPTOR STATUS, UNSPECIFIED SITE OF BREAST: Primary | ICD-10-CM

## 2024-01-03 PROCEDURE — G0463 HOSPITAL OUTPT CLINIC VISIT: HCPCS | Performed by: RADIOLOGY

## 2024-01-08 ENCOUNTER — TELEPHONE (OUTPATIENT)
Dept: HEMATOLOGY | Age: 69
End: 2024-01-08

## 2024-01-08 NOTE — PROGRESS NOTES
MEDICAL ONCOLOGY PROGRESS NOTE       Pt Name: Katie Cardenas  MRN: [id number]  YOB: 1955  Date of evaluation: 1/9/2024      HISTORY OF PRESENT ILLNESS:    Reason for MD visit-toxicity assessment/disease management.  The patient is currently status post completion of neoadjuvant chemotherapy for diagnosis of IDC of the right breast, grade 3, stage IIIa.  She underwent bilateral mastectomy on 8/15/2023.  She is status post adjuvant radiation.  She is currently receiving adjuvant therapy therapy with letrozole.  She is tolerated treatment relatively well except for complaints of very mild hot flashes.  Denies any significant arthralgia.  She had bone density done September 2023.    Diagnosis  Invasive ductal carcinoma, 1:00 right breast, Dec 2022  Grade 3  ER 99%, NV 0, HER-2 2+/equivocal, FISH-negative, Ki67 55%  MammaPrint: Luminal B/high risk.  Invasive ductal carcinoma, 4:00 right breast, Dec 2022  ER 96%, %, HER-2 2+/equivocal, FISH-negative, Ki67 15%  MammaPrint: Luminal B/high risk.  Grade 2  Clinical T3 N1 M0, grade 3, stage IIIa->ypT1N0(sn)  Germline Mina 81 gene genetic panel: Negative for pathogenic mutations; VUS BRIP1    Treatment Summary  2/15/23-6/9/23 Initiated dose dense Adriamycin Cyclophosphamide + GCSF every 2 weeks x4 cycles followed by weekly Taxol x 9 weekly cycles. Chemotherapy stopped due to development of neuropathy. We will omit the 3 remaining weekly cycles of Taxol.  8/15/23 Bilateral mastectomy with negative right SLNB (0/1) by Dr Brandon Jhaveri/[facility]  6/9/23 Initiated endocrine hormone therapy letrozole 2.5mg daily  10/2/23-11/15/23 Completed Radiation Therapy 5040 cGy over 28 fractions to right axilla by  at [facility]    Cancer History:  Katie Cardenas was first seen by me 1/25/2023.  She felt a palpable mass in the right breast.  Family history significant for history of breast cancer paternal grandmother.  No prior use of hormone replacement

## 2024-01-09 ENCOUNTER — OFFICE VISIT (OUTPATIENT)
Dept: FAMILY MEDICINE CLINIC | Facility: CLINIC | Age: 69
End: 2024-01-09
Payer: MEDICARE

## 2024-01-09 ENCOUNTER — OFFICE VISIT (OUTPATIENT)
Dept: HEMATOLOGY | Age: 69
End: 2024-01-09

## 2024-01-09 ENCOUNTER — HOSPITAL ENCOUNTER (OUTPATIENT)
Dept: INFUSION THERAPY | Age: 69
Discharge: HOME | End: 2024-01-09
Payer: MEDICARE

## 2024-01-09 VITALS
WEIGHT: 175 LBS | TEMPERATURE: 98.3 F | RESPIRATION RATE: 16 BRPM | HEART RATE: 70 BPM | SYSTOLIC BLOOD PRESSURE: 128 MMHG | BODY MASS INDEX: 26.52 KG/M2 | DIASTOLIC BLOOD PRESSURE: 70 MMHG | OXYGEN SATURATION: 98 % | HEIGHT: 68 IN

## 2024-01-09 VITALS
HEIGHT: 68 IN | DIASTOLIC BLOOD PRESSURE: 70 MMHG | SYSTOLIC BLOOD PRESSURE: 132 MMHG | WEIGHT: 174.8 LBS | BODY MASS INDEX: 26.49 KG/M2 | TEMPERATURE: 97.3 F | HEART RATE: 83 BPM | OXYGEN SATURATION: 95 %

## 2024-01-09 DIAGNOSIS — Z85.3 ENCOUNTER FOR FOLLOW-UP SURVEILLANCE OF BREAST CANCER: ICD-10-CM

## 2024-01-09 DIAGNOSIS — I10 ESSENTIAL HYPERTENSION: Primary | ICD-10-CM

## 2024-01-09 DIAGNOSIS — Z51.81 ENCOUNTER FOR MONITORING AROMATASE INHIBITOR THERAPY: ICD-10-CM

## 2024-01-09 DIAGNOSIS — Z79.811 ENCOUNTER FOR MONITORING AROMATASE INHIBITOR THERAPY: ICD-10-CM

## 2024-01-09 DIAGNOSIS — C50.911 INVASIVE DUCTAL CARCINOMA OF BREAST, FEMALE, RIGHT (HCC): ICD-10-CM

## 2024-01-09 DIAGNOSIS — C50.911 MALIGNANT NEOPLASM OF RIGHT FEMALE BREAST, UNSPECIFIED ESTROGEN RECEPTOR STATUS, UNSPECIFIED SITE OF BREAST: ICD-10-CM

## 2024-01-09 DIAGNOSIS — C50.211 MALIGNANT NEOPLASM OF UPPER-INNER QUADRANT OF RIGHT BREAST IN FEMALE, ESTROGEN RECEPTOR POSITIVE (HCC): Primary | ICD-10-CM

## 2024-01-09 DIAGNOSIS — Z17.0 MALIGNANT NEOPLASM OF UPPER-INNER QUADRANT OF RIGHT BREAST IN FEMALE, ESTROGEN RECEPTOR POSITIVE (HCC): Primary | ICD-10-CM

## 2024-01-09 DIAGNOSIS — Z71.89 CARE PLAN DISCUSSED WITH PATIENT: ICD-10-CM

## 2024-01-09 DIAGNOSIS — Z08 ENCOUNTER FOR FOLLOW-UP SURVEILLANCE OF BREAST CANCER: ICD-10-CM

## 2024-01-09 PROCEDURE — 99212 OFFICE O/P EST SF 10 MIN: CPT

## 2024-01-09 RX ORDER — LETROZOLE 2.5 MG/1
2.5 TABLET, FILM COATED ORAL DAILY
Qty: 90 TABLET | Refills: 3 | Status: SHIPPED | OUTPATIENT
Start: 2024-01-09

## 2024-01-09 NOTE — PROGRESS NOTES
The ABCs of the Annual Wellness Visit  Subsequent Medicare Wellness Visit    Subjective      Karen Tipton is a 68 y.o. female who presents for a Subsequent Medicare Wellness Visit.    The following portions of the patient's history were reviewed and   updated as appropriate: allergies, current medications, past family history, past medical history, past social history, past surgical history, and problem list.    Compared to one year ago, the patient feels her physical   health is the same.    Compared to one year ago, the patient feels her mental   health is the same.    Recent Hospitalizations:  She was not admitted to the hospital during the last year.       Current Medical Providers:  Patient Care Team:  Jamin Brambila MD as PCP - General  Ismael Ramachandran MD as Consulting Physician (Gastroenterology)    Outpatient Medications Prior to Visit   Medication Sig Dispense Refill    letrozole (FEMARA) 2.5 MG tablet Take 1 tablet by mouth Daily.      lisinopril-hydrochlorothiazide (PRINZIDE,ZESTORETIC) 10-12.5 MG per tablet Take 1 tablet by mouth once daily 90 tablet 0    montelukast (SINGULAIR) 10 MG tablet Take 1 tablet by mouth Every Night.       Facility-Administered Medications Prior to Visit   Medication Dose Route Frequency Provider Last Rate Last Admin    lidocaine (XYLOCAINE) 1 % injection 5 mL  5 mL Infiltration Once Michael Brewer MD           No opioid medication identified on active medication list. I have reviewed chart for other potential  high risk medication/s and harmful drug interactions in the elderly.        Aspirin is not on active medication list.  Aspirin use is not indicated based on review of current medical condition/s. Risk of harm outweighs potential benefits.  .    Patient Active Problem List   Diagnosis    Essential hypertension    Low back pain without sciatica    Hx of colonic polyp    Bilateral impacted cerumen    Malignant neoplasm of right female breast    S/P  "bilateral mastectomy    S/P lymph node biopsy    Non-smoker    History of radiation therapy     Advance Care Planning   Advance Care Planning     Advance Directive is on file.  ACP discussion was declined by the patient. Patient has an advance directive in EMR which is still valid.      Objective    Vitals:    24 0750   BP: 128/70   Pulse: 70   Resp: 16   Temp: 98.3 °F (36.8 °C)   SpO2: 98%   Weight: 79.4 kg (175 lb)   Height: 172.7 cm (67.99\")     Estimated body mass index is 26.61 kg/m² as calculated from the following:    Height as of this encounter: 172.7 cm (67.99\").    Weight as of this encounter: 79.4 kg (175 lb).    BMI is >= 25 and <30. (Overweight) The following options were offered after discussion;: nutrition counseling/recommendations      Does the patient have evidence of cognitive impairment?   No            HEALTH RISK ASSESSMENT    Smoking Status:  Social History     Tobacco Use   Smoking Status Never   Smokeless Tobacco Never     Alcohol Consumption:  Social History     Substance and Sexual Activity   Alcohol Use Yes    Comment: rare     Fall Risk Screen:    STEADI Fall Risk Assessment was completed, and patient is at LOW risk for falls.Assessment completed on:2024    Depression Screenin/9/2024     7:50 AM   PHQ-2/PHQ-9 Depression Screening   Little Interest or Pleasure in Doing Things 0-->not at all   Feeling Down, Depressed or Hopeless 0-->not at all   PHQ-9: Brief Depression Severity Measure Score 0       Health Habits and Functional and Cognitive Screenin/9/2024     7:00 AM   Functional & Cognitive Status   Do you have difficulty preparing food and eating? No   Do you have difficulty bathing yourself, getting dressed or grooming yourself? No   Do you have difficulty using the toilet? No   Do you have difficulty moving around from place to place? No   Do you have trouble with steps or getting out of a bed or a chair? No   Current Diet Well Balanced Diet   Dental Exam " Up to date   Eye Exam Up to date   Exercise (times per week) 5 times per week   Current Exercises Include House Cleaning   Do you need help using the phone?  No   Are you deaf or do you have serious difficulty hearing?  No   Do you need help to go to places out of walking distance? No   Do you need help shopping? No   Do you need help preparing meals?  No   Do you need help with housework?  No   Do you need help with laundry? No   Do you need help taking your medications? No   Do you need help managing money? No   Do you ever drive or ride in a car without wearing a seat belt? No   Have you felt unusual stress, anger or loneliness in the last month? No   Who do you live with? Spouse   If you need help, do you have trouble finding someone available to you? No   Have you been bothered in the last four weeks by sexual problems? No   Do you have difficulty concentrating, remembering or making decisions? No       Age-appropriate Screening Schedule:  Refer to the list below for future screening recommendations based on patient's age, sex and/or medical conditions. Orders for these recommended tests are listed in the plan section. The patient has been provided with a written plan.    Health Maintenance   Topic Date Due    Pneumococcal Vaccine 65+ (1 of 2 - PCV) Never done    TDAP/TD VACCINES (1 - Tdap) Never done    ZOSTER VACCINE (1 of 2) Never done    HEPATITIS C SCREENING  Never done    PAP SMEAR  Never done    COVID-19 Vaccine (3 - Moderna risk series) 12/17/2021    ANNUAL WELLNESS VISIT  01/04/2024    COLORECTAL CANCER SCREENING  08/05/2024    BMI FOLLOWUP  01/09/2025    DXA SCAN  09/12/2025    INFLUENZA VACCINE  Completed                  CMS Preventative Services Quick Reference  Risk Factors Identified During Encounter:    Fall Risk-High or Moderate: Discussed Fall Prevention in the home    The above risks/problems have been discussed with the patient.  Pertinent information has been shared with the patient in the  After Visit Summary.    Diagnoses and all orders for this visit:    1. Essential hypertension (Primary)    2. Malignant neoplasm of right female breast, unspecified estrogen receptor status, unspecified site of breast        Follow Up:   Next Medicare Wellness visit to be scheduled in 1 year.      An After Visit Summary and PPPS were made available to the patient.

## 2024-01-09 NOTE — PROGRESS NOTES
"Subjective   Karen Tipton is a 68 y.o. female.     Chief Complaint   Patient presents with    Hypertension     Wants her ears checked    Medicare Wellness-subsequent        Hypertension         Bp is stable without cp or ha      Current Outpatient Medications:     letrozole (FEMARA) 2.5 MG tablet, Take 1 tablet by mouth Daily., Disp: , Rfl:     lisinopril-hydrochlorothiazide (PRINZIDE,ZESTORETIC) 10-12.5 MG per tablet, Take 1 tablet by mouth once daily, Disp: 90 tablet, Rfl: 0    montelukast (SINGULAIR) 10 MG tablet, Take 1 tablet by mouth Every Night., Disp: , Rfl:     Current Facility-Administered Medications:     lidocaine (XYLOCAINE) 1 % injection 5 mL, 5 mL, Infiltration, Once, Michael Brewer MD  No Known Allergies    BMI is >= 25 and <30. (Overweight) The following options were offered after discussion;: nutrition counseling/recommendations      Facility age limit for growth %chet is 20 years.    Past Medical History:   Diagnosis Date    Colon polyp     Hypertension      Past Surgical History:   Procedure Laterality Date    BREAST BIOPSY      COLONOSCOPY  06/10/2014    3 polyps destroyed    COLONOSCOPY N/A 8/5/2019    Procedure: COLONOSCOPY WITH ANESTHESIA;  Surgeon: Ismael Ramachandran MD;  Location: [facility] ENDOSCOPY;  Service: Gastroenterology       Review of Systems   Constitutional: Negative.    HENT: Negative.     Eyes: Negative.    Respiratory: Negative.     Cardiovascular: Negative.    Gastrointestinal: Negative.    Endocrine: Negative.    Genitourinary: Negative.    Musculoskeletal: Negative.    Skin: Negative.    Allergic/Immunologic: Negative.    Neurological: Negative.    Hematological: Negative.    Psychiatric/Behavioral: Negative.         Objective /70   Pulse 70   Temp 98.3 °F (36.8 °C)   Resp 16   Ht 172.7 cm (67.99\")   Wt 79.4 kg (175 lb)   SpO2 98%   BMI 26.61 kg/m²    Physical Exam  Vitals and nursing note reviewed.   Constitutional:       Appearance: Normal appearance. "   HENT:      Head: Normocephalic.      Nose: Nose normal.      Mouth/Throat:      Mouth: Mucous membranes are moist.   Eyes:      Pupils: Pupils are equal, round, and reactive to light.   Cardiovascular:      Rate and Rhythm: Normal rate and regular rhythm.      Pulses: Normal pulses.      Heart sounds: Normal heart sounds.   Pulmonary:      Effort: Pulmonary effort is normal.   Abdominal:      General: Abdomen is flat.   Musculoskeletal:         General: Normal range of motion.      Cervical back: Normal range of motion and neck supple.   Skin:     General: Skin is warm.      Capillary Refill: Capillary refill takes less than 2 seconds.   Neurological:      General: No focal deficit present.      Mental Status: She is alert and oriented to person, place, and time. Mental status is at baseline.   Psychiatric:         Mood and Affect: Mood normal.         Assessment & Plan   Diagnoses and all orders for this visit:    1. Essential hypertension (Primary)    2. Malignant neoplasm of right female breast, unspecified estrogen receptor status, unspecified site of breast      She will motrmiro bp and keep me infoemd           No orders of the defined types were placed in this encounter.      Follow up: 6 month(s)

## 2024-01-14 DIAGNOSIS — Z90.13 STATUS POST BILATERAL MASTECTOMY: ICD-10-CM

## 2024-01-15 RX ORDER — MONTELUKAST SODIUM 10 MG/1
10 TABLET ORAL DAILY
Qty: 30 TABLET | Refills: 3 | Status: SHIPPED | OUTPATIENT
Start: 2024-01-15

## 2024-01-15 RX ORDER — LISINOPRIL AND HYDROCHLOROTHIAZIDE 12.5; 1 MG/1; MG/1
TABLET ORAL
Qty: 90 TABLET | Refills: 0 | Status: SHIPPED | OUTPATIENT
Start: 2024-01-15

## 2024-01-15 NOTE — TELEPHONE ENCOUNTER
Rx Refill Note  Requested Prescriptions     Pending Prescriptions Disp Refills    lisinopril-hydrochlorothiazide (PRINZIDE,ZESTORETIC) 10-12.5 MG per tablet [Pharmacy Med Name: Lisinopril-hydroCHLOROthiazide 10-12.5 MG Oral Tablet] 90 tablet 0     Sig: Take 1 tablet by mouth once daily      Last office visit with prescribing clinician: 1/9/2024   Last telemedicine visit with prescribing clinician: Visit date not found   Next office visit with prescribing clinician: 7/9/2024                         Would you like a call back once the refill request has been completed: [] Yes [] No    If the office needs to give you a call back, can they leave a voicemail: [] Yes [] No    Jordyn Lee, PCT  01/15/24, 08:29 CST    never used

## 2024-02-07 ENCOUNTER — LAB (OUTPATIENT)
Dept: LAB | Facility: HOSPITAL | Age: 69
End: 2024-02-07
Payer: MEDICARE

## 2024-02-07 ENCOUNTER — OFFICE VISIT (OUTPATIENT)
Dept: OTOLARYNGOLOGY | Facility: CLINIC | Age: 69
End: 2024-02-07
Payer: MEDICARE

## 2024-02-07 VITALS
HEIGHT: 68 IN | TEMPERATURE: 98 F | SYSTOLIC BLOOD PRESSURE: 115 MMHG | BODY MASS INDEX: 26.22 KG/M2 | DIASTOLIC BLOOD PRESSURE: 60 MMHG | WEIGHT: 173 LBS | HEART RATE: 80 BPM

## 2024-02-07 DIAGNOSIS — E04.1 THYROID NODULE: Primary | ICD-10-CM

## 2024-02-07 DIAGNOSIS — E04.1 THYROID NODULE: ICD-10-CM

## 2024-02-07 DIAGNOSIS — H61.23 BILATERAL IMPACTED CERUMEN: ICD-10-CM

## 2024-02-07 PROCEDURE — 84439 ASSAY OF FREE THYROXINE: CPT

## 2024-02-07 PROCEDURE — 84480 ASSAY TRIIODOTHYRONINE (T3): CPT

## 2024-02-07 PROCEDURE — 84443 ASSAY THYROID STIM HORMONE: CPT

## 2024-02-07 PROCEDURE — 36415 COLL VENOUS BLD VENIPUNCTURE: CPT

## 2024-02-07 NOTE — PROGRESS NOTES
YOB: 1955  Location: [facility]  Location Address: [address] [place] [address], [facility], [address] [place] [address]  Location Phone: [phone]    Chief Complaint   Patient presents with    Thyroid Problem       History of Present Illness  Karen Tipton is a 69 y.o. female.  Karen Tipton is here for follow up of ENT complaints. The patient has had problems with thyroid nodules   Nodules were found incidentally on ct chest during work up for breast cancer   She denies sore throat, hoarseness or dysphagia.   Largest nodule underwent fine needle aspiration 2023 with benign pathology     Fine Needle Aspiration (2023 09:23)   US Thyroid (2024 13:56)      Past Medical History:   Diagnosis Date    Colon polyp     Hypertension        Past Surgical History:   Procedure Laterality Date    BREAST BIOPSY      COLONOSCOPY  06/10/2014    3 polyps destroyed    COLONOSCOPY N/A 2019    Procedure: COLONOSCOPY WITH ANESTHESIA;  Surgeon: Ismael Ramachandran MD;  Location: [facility] ENDOSCOPY;  Service: Gastroenterology    MASTECTOMY Bilateral        Outpatient Medications Marked as Taking for the 24 encounter (Office Visit) with Raegan Cheema APRN   Medication Sig Dispense Refill    letrozole (FEMARA) 2.5 MG tablet Take 1 tablet by mouth Daily.      lisinopril-hydrochlorothiazide (PRINZIDE,ZESTORETIC) 10-12.5 MG per tablet Take 1 tablet by mouth once daily 90 tablet 0    montelukast (SINGULAIR) 10 MG tablet Take 1 tablet by mouth Every Night.       Current Facility-Administered Medications for the 24 encounter (Office Visit) with Raegan Cheema APRN   Medication Dose Route Frequency Provider Last Rate Last Admin    lidocaine (XYLOCAINE) 1 % injection 5 mL  5 mL Infiltration Once Michael Brewer MD           Patient has no known allergies.    Family History   Problem Relation Age of Onset    Stroke Mother     Dementia Mother     Cancer Father         protate    Colon cancer Paternal Grandfather         Social History     Socioeconomic History    Marital status:    Tobacco Use    Smoking status: Never    Smokeless tobacco: Never   Vaping Use    Vaping Use: Never used   Substance and Sexual Activity    Alcohol use: Yes     Comment: rare    Drug use: No    Sexual activity: Defer       Review of Systems   Constitutional: Negative.    HENT: Negative.         Vitals:    02/07/24 1405   BP: 115/60   Pulse: 80   Temp: 98 °F (36.7 °C)       Body mass index is 26.3 kg/m².    Objective     Physical Exam  Vitals reviewed.   Constitutional:       Appearance: Normal appearance. She is normal weight.   HENT:      Head: Normocephalic.      Right Ear: Tympanic membrane, ear canal and external ear normal.      Left Ear: Tympanic membrane, ear canal and external ear normal.      Nose: Nose normal.      Mouth/Throat:      Lips: Pink.      Mouth: Mucous membranes are moist.      Pharynx: Uvula midline.   Neck:      Comments: Bilateral palpable thyroid nodules     Musculoskeletal:      Cervical back: Full passive range of motion without pain.   Neurological:      Mental Status: She is alert.         Assessment & Plan   Diagnoses and all orders for this visit:    1. Thyroid nodule (Primary)  -     TSH; Future  -     T4, Free; Future  -     T3; Future  -     US Thyroid; Future    2. Bilateral impacted cerumen      * Surgery not found *  Orders Placed This Encounter   Procedures    US Thyroid     Standing Status:   Future     Standing Expiration Date:   2/7/2025     Order Specific Question:   Reason for Exam:     Answer:   Thyroid disease     Order Specific Question:   Release to patient     Answer:   Routine Release [[id number]]    TSH     Standing Status:   Future     Number of Occurrences:   1     Standing Expiration Date:   2/7/2025     Order Specific Question:   Release to patient     Answer:   Routine Release [[id number]]    T4, Free     Standing Status:   Future     Standing Expiration Date:   2/7/2025     Order  Specific Question:   Release to patient     Answer:   Routine Release [[id number]]    T3     Standing Status:   Future     Standing Expiration Date:   2/7/2025     Order Specific Question:   Release to patient     Answer:   Routine Release [[id number]]     Return in about 6 months (around 8/7/2024) for Recheck.     Thyroid labs today   Repeat ultrasound in 6 months   Call for new/worsening concerns     Patient Instructions   The patient has a thyroid nodule, which is relatively small, and studies do not suggest a malignancy. I have recommended observation with follow-up with me for repeat ultrasound. I explained the pathology of thyroid nodules including the risks of cancer.

## 2024-02-08 ENCOUNTER — TELEPHONE (OUTPATIENT)
Dept: OTOLARYNGOLOGY | Facility: CLINIC | Age: 69
End: 2024-02-08
Payer: MEDICARE

## 2024-02-08 LAB
T3 SERPL-MCNC: 119 NG/DL (ref 80–200)
T4 FREE SERPL-MCNC: 1.08 NG/DL (ref 0.93–1.7)
TSH SERPL DL<=0.05 MIU/L-ACNC: 1.01 UIU/ML (ref 0.27–4.2)

## 2024-02-08 NOTE — TELEPHONE ENCOUNTER
Patient notified    ----- Message from YANNICK Leavitt sent at 2/8/2024  9:54 AM CST -----  Labs normal

## 2024-04-09 RX ORDER — LISINOPRIL AND HYDROCHLOROTHIAZIDE 12.5; 1 MG/1; MG/1
TABLET ORAL
Qty: 90 TABLET | Refills: 0 | Status: SHIPPED | OUTPATIENT
Start: 2024-04-09

## 2024-05-12 DIAGNOSIS — Z90.13 STATUS POST BILATERAL MASTECTOMY: ICD-10-CM

## 2024-05-13 RX ORDER — MONTELUKAST SODIUM 10 MG/1
10 TABLET ORAL DAILY
Qty: 30 TABLET | Refills: 3 | Status: SHIPPED | OUTPATIENT
Start: 2024-05-13

## 2024-06-11 NOTE — PROGRESS NOTES
HISTORY OF PRESENT ILLNESS:    Ms. Cardenas presents today for a breast exam following bilateral simple mastectomy with right sentinel lymph node biopsy on 8/15/2023    She has finished radiation therapy     She is originally status post ultrasound guided breast biopsy on the right x's 2 areas. At 1:00 revealed a 1.6  cm high grade invasive ductal carcinoma. At 4:00 revealed a 1 cm intermediate grade invasive ductal mammary carcinoma. (1:00) ER positive at 99%. MN negative. Her2 positive. Ki67 55%.  (4:00) ER positive at 96%. MN positive at 100%. Her2 positive. Ki67 14%.  FISH Negative for both.     Her repeat markers showed ER positive at 80%.  MN positive at 3%.  HER2 is equivocal 2+ and Ki-67 is 2%.    Ultrasound-guided FNA of right axillary node was negative for malignancy    Mammaprint is High Risk Luminal B-Type     MRI-7/25/2023  FINDINGS:  Amount of Fibroglandular Tissue: Heterogeneous fibroglandular tissue.  Background Parenchymal Enhancement:  Mild.  Right breast: On prior exam there are 3 areas of abnormal kinetics  described in the right breast. The lesion in the upper inner quadrant  of the right breast does not demonstrate abnormal kinetics.  The lesion in the upper outer right breast does not demonstrate  abnormal kinetics.  The lesion in the medial proximal third of the right breast  demonstrates persistent abnormal kinetics. This is in the right inner  quadrant of the left breast 5:00 position 1.7 x 0.9 x 1.6 cm. Curve  peak is 80%. This is a biopsy-proven malignancy.  Left breast: Heterogeneous fibroglandular tissue is present in the  left breast with no focal regions of architectural distortion or  abnormal kinetics.  Nodes: A suspicious node in the right axilla is again noted.  Appearance is concerning for willy disease and similar to prior exam  of January 12, 2023  Partially visualized chest/abdomen: Grossly unremarkable  Bones: No suspicious bony findings  IMPRESSION:  1. Persistent area of

## 2024-06-13 ENCOUNTER — OFFICE VISIT (OUTPATIENT)
Dept: SURGERY | Age: 69
End: 2024-06-13

## 2024-06-13 VITALS — BODY MASS INDEX: 26.07 KG/M2 | WEIGHT: 172 LBS | HEART RATE: 68 BPM | HEIGHT: 68 IN | OXYGEN SATURATION: 99 %

## 2024-06-13 DIAGNOSIS — C50.211 MALIGNANT NEOPLASM OF UPPER-INNER QUADRANT OF RIGHT BREAST IN FEMALE, ESTROGEN RECEPTOR POSITIVE (HCC): ICD-10-CM

## 2024-06-13 DIAGNOSIS — Z17.0 MALIGNANT NEOPLASM OF UPPER-INNER QUADRANT OF RIGHT BREAST IN FEMALE, ESTROGEN RECEPTOR POSITIVE (HCC): ICD-10-CM

## 2024-06-13 DIAGNOSIS — C50.311 MALIGNANT NEOPLASM OF LOWER-INNER QUADRANT OF RIGHT BREAST OF FEMALE, ESTROGEN RECEPTOR POSITIVE (HCC): ICD-10-CM

## 2024-06-13 DIAGNOSIS — Z17.0 MALIGNANT NEOPLASM OF LOWER-INNER QUADRANT OF RIGHT BREAST OF FEMALE, ESTROGEN RECEPTOR POSITIVE (HCC): ICD-10-CM

## 2024-06-13 DIAGNOSIS — Z90.13 STATUS POST BILATERAL MASTECTOMY: Primary | ICD-10-CM

## 2024-08-07 ENCOUNTER — OFFICE VISIT (OUTPATIENT)
Dept: OTOLARYNGOLOGY | Facility: CLINIC | Age: 69
End: 2024-08-07
Payer: MEDICARE

## 2024-08-07 VITALS
DIASTOLIC BLOOD PRESSURE: 68 MMHG | BODY MASS INDEX: 21.22 KG/M2 | SYSTOLIC BLOOD PRESSURE: 132 MMHG | HEART RATE: 75 BPM | WEIGHT: 140 LBS | HEIGHT: 68 IN | TEMPERATURE: 98 F

## 2024-08-07 DIAGNOSIS — E04.1 THYROID NODULE: Primary | ICD-10-CM

## 2024-08-07 NOTE — PROGRESS NOTES
YOB: 1955  Location: [facility]  Location Address: [address] [place] [address], [facility] [address], [address] [place] [address]  Location Phone: [phone]    Chief Complaint   Patient presents with    Thyroid Problem       History of Present Illness  Karen Tipton is a 69 y.o. female.  Karen Tipton is here for follow up of ENT complaints. The patient has had problems with thyroid nodules.  Nodules were found incidentally on CT chest during workup for breast cancer.  Patient denies sore throat hoarseness or dysphagia.  Dominant nodule underwent fine-needle aspiration and 2023 with benign pathology   patient is currently not taking thyroid replacement therapy      Past Medical History:   Diagnosis Date    Colon polyp     Hypertension        Past Surgical History:   Procedure Laterality Date    BREAST BIOPSY      COLONOSCOPY  06/10/2014    3 polyps destroyed    COLONOSCOPY N/A 2019    Procedure: COLONOSCOPY WITH ANESTHESIA;  Surgeon: Ismael Ramachandran MD;  Location: [facility] ENDOSCOPY;  Service: Gastroenterology    MASTECTOMY Bilateral        Outpatient Medications Marked as Taking for the 24 encounter (Office Visit) with Raegan Cheema APRN   Medication Sig Dispense Refill    letrozole (FEMARA) 2.5 MG tablet Take 1 tablet by mouth Daily.      lisinopril-hydrochlorothiazide (PRINZIDE,ZESTORETIC) 10-12.5 MG per tablet Take 1 tablet by mouth once daily 90 tablet 0    montelukast (SINGULAIR) 10 MG tablet Take 1 tablet by mouth Every Night.       Current Facility-Administered Medications for the 24 encounter (Office Visit) with Raegan Cheema APRN   Medication Dose Route Frequency Provider Last Rate Last Admin    lidocaine (XYLOCAINE) 1 % injection 5 mL  5 mL Infiltration Once Michael Brewer MD           Patient has no known allergies.    Family History   Problem Relation Age of Onset    Stroke Mother     Dementia Mother     Cancer Father         protate    Stroke Father         My dad also had  a stroke    Colon cancer Paternal Grandfather     Cancer Paternal Grandmother         Breast cancer       Social History     Socioeconomic History    Marital status:    Tobacco Use    Smoking status: Never    Smokeless tobacco: Never   Vaping Use    Vaping status: Never Used   Substance and Sexual Activity    Alcohol use: Yes     Comment: Very rarely    Drug use: No    Sexual activity: Defer       Review of Systems   Constitutional: Negative.    HENT: Negative.         Vitals:    08/07/24 1406   BP: 132/68   Pulse: 75   Temp: 98 °F (36.7 °C)       Body mass index is 21.29 kg/m².    Objective     Physical Exam  Vitals reviewed.   Constitutional:       Appearance: Normal appearance. She is normal weight.   HENT:      Head: Normocephalic.      Right Ear: Tympanic membrane, ear canal and external ear normal.      Left Ear: Tympanic membrane, ear canal and external ear normal.      Nose: Nose normal.      Mouth/Throat:      Lips: Pink.      Mouth: Mucous membranes are moist.      Pharynx: Uvula midline.   Neck:      Comments: Bilateral palpable thyroid nodules     Musculoskeletal:      Cervical back: Full passive range of motion without pain.   Neurological:      Mental Status: She is alert.         Assessment & Plan   Diagnoses and all orders for this visit:    1. Thyroid nodule (Primary)  -     US Thyroid; Future  -     T4, Free; Future  -     TSH; Future  -     T3; Future      * Surgery not found *  Orders Placed This Encounter   Procedures    US Thyroid     Standing Status:   Future     Standing Expiration Date:   8/7/2025     Order Specific Question:   Reason for Exam:     Answer:   Thyroid disease     Order Specific Question:   Release to patient     Answer:   Routine Release [[id number]]    T4, Free     Standing Status:   Future     Standing Expiration Date:   8/7/2025     Order Specific Question:   Release to patient     Answer:   Routine Release [[id number]]    TSH     Standing Status:   Future      Standing Expiration Date:   8/7/2025     Order Specific Question:   Release to patient     Answer:   Routine Release [[id number]]    T3     Standing Status:   Future     Standing Expiration Date:   8/7/2025     Order Specific Question:   Release to patient     Answer:   Routine Release [[id number]]     No follow-ups on file.     Thyroid labs today.  Repeat ultrasound in 6 months  Patient Instructions   The patient has a thyroid nodule, which is relatively small, and studies do not suggest a malignancy. I have recommended observation with follow-up with me for repeat ultrasound. I explained the pathology of thyroid nodules including the risks of cancer.

## 2024-08-12 NOTE — PROGRESS NOTES
[facility]astroenterology    Primary Physician Elizabeth Pitts APRN    8/13/2024    Karen Tipton   1955      Chief Complaint   Patient presents with    Colonoscopy       Subjective     HPI    Karen Tipton is a 69 y.o. female who presents as a referral for preventative maintenance. She has no complaints of nausea or vomiting. No change in bowels. No wt loss. No BRBPR. No melena. No abdominal pain.         COLONOSCOPY (08/05/2019 07:48) recall 5 years.   Tissue Pathology Exam (08/05/2019 08:05) adenomatous.        Paternal GF had colon cancer.         Past Medical History:   Diagnosis Date    Colon polyp     Hypertension        Past Surgical History:   Procedure Laterality Date    BREAST BIOPSY      COLONOSCOPY  06/10/2014    3 polyps destroyed    COLONOSCOPY N/A 08/05/2019    Procedure: COLONOSCOPY WITH ANESTHESIA;  Surgeon: Ismael Ramachandran MD;  Location: [facility] ENDOSCOPY;  Service: Gastroenterology    MASTECTOMY Bilateral        Outpatient Medications Marked as Taking for the 8/13/24 encounter (Office Visit) with Mary Rodgers APRN   Medication Sig Dispense Refill    letrozole (FEMARA) 2.5 MG tablet Take 1 tablet by mouth Daily.      lisinopril-hydrochlorothiazide (PRINZIDE,ZESTORETIC) 10-12.5 MG per tablet Take 1 tablet by mouth once daily 90 tablet 0    montelukast (SINGULAIR) 10 MG tablet Take 1 tablet by mouth Every Night.       Current Facility-Administered Medications for the 8/13/24 encounter (Office Visit) with Mary Rodgers APRN   Medication Dose Route Frequency Provider Last Rate Last Admin    lidocaine (XYLOCAINE) 1 % injection 5 mL  5 mL Infiltration Once Michael Brewer MD           No Known Allergies    Social History     Socioeconomic History    Marital status:    Tobacco Use    Smoking status: Never    Smokeless tobacco: Never   Vaping Use    Vaping status: Never Used   Substance and Sexual Activity    Alcohol use: Yes     Comment: Very rarely    Drug use: No     Sexual activity: Defer       Family History   Problem Relation Age of Onset    Stroke Mother     Dementia Mother     Cancer Father         protate    Stroke Father         My dad also had a stroke    Colon cancer Paternal Grandfather     Cancer Paternal Grandmother         Breast cancer       Review of Systems   Constitutional:  Negative for chills, fever and unexpected weight change.   Respiratory:  Negative for shortness of breath.    Cardiovascular:  Negative for chest pain.   Gastrointestinal:  Negative for abdominal distention, abdominal pain, anal bleeding, blood in stool, constipation, diarrhea, nausea and vomiting.       Objective     Vitals:    08/13/24 0843   BP: 154/70   Pulse: 82   Temp: 97.8 °F (36.6 °C)   SpO2: 98%         08/13/24  0843   Weight: 78 kg (172 lb)     Body mass index is 26.15 kg/m².    Physical Exam  Vitals reviewed.   Constitutional:       General: She is not in acute distress.  Cardiovascular:      Rate and Rhythm: Normal rate and regular rhythm.      Heart sounds: Normal heart sounds.   Pulmonary:      Effort: Pulmonary effort is normal.      Breath sounds: Normal breath sounds.   Abdominal:      General: Bowel sounds are normal. There is no distension.      Palpations: Abdomen is soft.      Tenderness: There is no abdominal tenderness.   Skin:     General: Skin is warm and dry.   Neurological:      Mental Status: She is alert.         Imaging Results (Most Recent)       None            Assessment & Plan     Diagnoses and all orders for this visit:    1. History of adenomatous polyp of colon (Primary)  -     Cancel: Case Request; Standing  -     Cancel: Case Request  -     External Facility Surgical/Procedural Request; Future    2. Family hx of colon cancer  Comments:  SDR  Orders:  -     External Facility Surgical/Procedural Request; Future    Other orders  -     Cancel: Implement Anesthesia Orders Day of Procedure; Standing  -     Cancel: Obtain Informed Consent;  Standing      Schedule colonoscopy. Miralax prep.                COLONOSCOPY WITH ANESTHESIA (N/A)  All risks, benefits, alternatives, and indications of colonoscopy procedure have been discussed with the patient. Risks to include perforation of the colon requiring possible surgery or colostomy, risk of bleeding from biopsies or removal of colon tissue, possibility of missing a colon polyp or cancer, or adverse drug reaction.  Benefits to include the diagnosis and management of disease of the colon and rectum. Alternatives to include barium enema, radiographic evaluation, lab testing or no intervention. Pt verbalizes understanding and agrees.     There are no Patient Instructions on file for this visit.    Mary Rodgers, APRN

## 2024-08-13 ENCOUNTER — OFFICE VISIT (OUTPATIENT)
Dept: GASTROENTEROLOGY | Facility: CLINIC | Age: 69
End: 2024-08-13
Payer: MEDICARE

## 2024-08-13 VITALS
DIASTOLIC BLOOD PRESSURE: 70 MMHG | HEART RATE: 82 BPM | TEMPERATURE: 97.8 F | WEIGHT: 172 LBS | OXYGEN SATURATION: 98 % | BODY MASS INDEX: 26.07 KG/M2 | HEIGHT: 68 IN | SYSTOLIC BLOOD PRESSURE: 154 MMHG

## 2024-08-13 DIAGNOSIS — Z86.010 HISTORY OF ADENOMATOUS POLYP OF COLON: Primary | ICD-10-CM

## 2024-08-13 DIAGNOSIS — Z80.0 FAMILY HX OF COLON CANCER: ICD-10-CM

## 2024-08-15 ENCOUNTER — TELEPHONE (OUTPATIENT)
Dept: HEMATOLOGY | Age: 69
End: 2024-08-15

## 2024-08-15 DIAGNOSIS — C50.211 MALIGNANT NEOPLASM OF UPPER-INNER QUADRANT OF RIGHT BREAST IN FEMALE, ESTROGEN RECEPTOR POSITIVE (HCC): Primary | ICD-10-CM

## 2024-08-15 DIAGNOSIS — Z17.0 MALIGNANT NEOPLASM OF UPPER-INNER QUADRANT OF RIGHT BREAST IN FEMALE, ESTROGEN RECEPTOR POSITIVE (HCC): Primary | ICD-10-CM

## 2024-08-15 NOTE — PROGRESS NOTES
Progress Note      Pt Name: Katie Cardenas  YOB: 1955  MRN: [id number]    Date of evaluation: 8/16/2024  History Obtained From:  patient, electronic medical record    CHIEF COMPLAINT:    Chief Complaint   Patient presents with    Follow-up     Malignant neoplasm of upper-inner quadrant of right breast in female, estrogen receptor positive (HCC)     HISTORY OF PRESENT ILLNESS:    Katie Cardenas is a 69 y.o.  female who is currently being followed for invasive ductal carcinoma of the right breast with 2 separate lesions, stage IIIa (1:00 lesion ER 99%, ID 0, HER2 2+/equivocal, FISH negative, Ki-67 55% grade 3 and 4:00 lesion 96% ER, %, HER2 2+/equivocal, FISH negative, Ki-67 15%, grade 2).  She is status post neoadjuvant chemotherapy, bilateral mastectomy, adjuvant radiation therapy and current recommendation is adjuvant aromatase inhibitor therapy with letrozole 2.5 mg p.o. daily for 5 years, through June 2028.  She has had no evidence to suggest recurrence of disease.  Katie returns today in scheduled follow-up for evaluation, lab monitoring and further treatment recommendations.    Today's clinic visit to include physical assessment, review of systems, any lab or radiographic findings that were available and plan of care are documented below.    ONCOLOGIC HISTORY:     Diagnosis  Invasive ductal carcinoma, 1:00 right breast, Dec 2022  Grade 3  ER 99%, ID 0, HER-2 2+/equivocal, FISH-negative, Ki67 55%  MammaPrint: Luminal B/high risk.  Invasive ductal carcinoma, 4:00 right breast, Dec 2022  ER 96%, %, HER-2 2+/equivocal, FISH-negative, Ki67 15%  MammaPrint: Luminal B/high risk.  Grade 2  Clinical T3 N1 M0, grade 3, stage IIIa->ypT1N0(sn)  Germline Mina 81 gene genetic panel: Negative for pathogenic mutations; VUS BRIP1     Treatment Summary  2/15/23-6/9/23 Initiated dose dense Adriamycin Cyclophosphamide + GCSF every 2 weeks x4 cycles followed by weekly Taxol x 9 weekly

## 2024-08-15 NOTE — TELEPHONE ENCOUNTER
I called and left patient a detailed voicemail with their appointment date and time for 08/16/24 and to come at the follow up appointment time and not the lab appointment time. I also made them aware of what that time was.  I made patient aware that we are in the [facility] and where it is located and gave the address in case, they use GPS. Left message for patient to call our office if they could not keep this appointment or if they did not know where our new building is located.

## 2024-08-16 ENCOUNTER — HOSPITAL ENCOUNTER (OUTPATIENT)
Dept: INFUSION THERAPY | Age: 69
Discharge: HOME | End: 2024-08-16
Payer: MEDICARE

## 2024-08-16 ENCOUNTER — OFFICE VISIT (OUTPATIENT)
Dept: HEMATOLOGY | Age: 69
End: 2024-08-16
Payer: MEDICARE

## 2024-08-16 VITALS
DIASTOLIC BLOOD PRESSURE: 78 MMHG | TEMPERATURE: 97.8 F | BODY MASS INDEX: 26.04 KG/M2 | OXYGEN SATURATION: 98 % | HEIGHT: 68 IN | SYSTOLIC BLOOD PRESSURE: 134 MMHG | WEIGHT: 171.8 LBS | HEART RATE: 62 BPM

## 2024-08-16 DIAGNOSIS — Z17.0 MALIGNANT NEOPLASM OF UPPER-INNER QUADRANT OF RIGHT BREAST IN FEMALE, ESTROGEN RECEPTOR POSITIVE (HCC): Primary | ICD-10-CM

## 2024-08-16 DIAGNOSIS — T45.1X5A HOT FLASHES RELATED TO AROMATASE INHIBITOR THERAPY: ICD-10-CM

## 2024-08-16 DIAGNOSIS — Z51.81 ENCOUNTER FOR MONITORING AROMATASE INHIBITOR THERAPY: ICD-10-CM

## 2024-08-16 DIAGNOSIS — R23.2 HOT FLASHES RELATED TO AROMATASE INHIBITOR THERAPY: ICD-10-CM

## 2024-08-16 DIAGNOSIS — E04.1 THYROID NODULE: ICD-10-CM

## 2024-08-16 DIAGNOSIS — C50.211 MALIGNANT NEOPLASM OF UPPER-INNER QUADRANT OF RIGHT BREAST IN FEMALE, ESTROGEN RECEPTOR POSITIVE (HCC): ICD-10-CM

## 2024-08-16 DIAGNOSIS — Z79.811 ENCOUNTER FOR MONITORING AROMATASE INHIBITOR THERAPY: ICD-10-CM

## 2024-08-16 DIAGNOSIS — Z17.0 MALIGNANT NEOPLASM OF UPPER-INNER QUADRANT OF RIGHT BREAST IN FEMALE, ESTROGEN RECEPTOR POSITIVE (HCC): ICD-10-CM

## 2024-08-16 DIAGNOSIS — C50.211 MALIGNANT NEOPLASM OF UPPER-INNER QUADRANT OF RIGHT BREAST IN FEMALE, ESTROGEN RECEPTOR POSITIVE (HCC): Primary | ICD-10-CM

## 2024-08-16 DIAGNOSIS — Z91.89 AT RISK FOR BONE DENSITY LOSS: ICD-10-CM

## 2024-08-16 LAB
ALBUMIN SERPL-MCNC: 4.1 G/DL (ref 3.5–5.2)
ALP SERPL-CCNC: 97 U/L (ref 35–104)
ALT SERPL-CCNC: 20 U/L (ref 5–33)
ANION GAP SERPL CALCULATED.3IONS-SCNC: 10 MMOL/L (ref 7–19)
AST SERPL-CCNC: 24 U/L (ref 5–32)
BASOPHILS # BLD: 0.06 K/UL (ref 0.01–0.08)
BASOPHILS NFR BLD: 1.3 % (ref 0.1–1.2)
BILIRUB SERPL-MCNC: 0.4 MG/DL (ref 0–1.2)
BUN SERPL-MCNC: 12 MG/DL (ref 8–23)
CALCIUM SERPL-MCNC: 9.6 MG/DL (ref 8.8–10.2)
CHLORIDE SERPL-SCNC: 106 MMOL/L (ref 98–107)
CO2 SERPL-SCNC: 26 MMOL/L (ref 22–29)
CREAT SERPL-MCNC: 0.7 MG/DL (ref 0.5–0.9)
EOSINOPHIL # BLD: 0.24 K/UL (ref 0.04–0.54)
EOSINOPHIL NFR BLD: 5.2 % (ref 0.7–7)
ERYTHROCYTE [DISTWIDTH] IN BLOOD BY AUTOMATED COUNT: 15.2 % (ref 11.7–14.4)
GLUCOSE SERPL-MCNC: 97 MG/DL (ref 70–99)
HCT VFR BLD AUTO: 39 % (ref 34.1–44.9)
HGB BLD-MCNC: 12.3 G/DL (ref 11.2–15.7)
LYMPHOCYTES # BLD: 1.34 K/UL (ref 1.18–3.74)
LYMPHOCYTES NFR BLD: 29 % (ref 19.3–53.1)
MCH RBC QN AUTO: 24.1 PG (ref 25.6–32.2)
MCHC RBC AUTO-ENTMCNC: 31.5 G/DL (ref 32.3–35.5)
MCV RBC AUTO: 76.5 FL (ref 79.4–94.8)
MONOCYTES # BLD: 0.44 K/UL (ref 0.24–0.82)
MONOCYTES NFR BLD: 9.5 % (ref 4.7–12.5)
NEUTROPHILS # BLD: 2.53 K/UL (ref 1.56–6.13)
NEUTS SEG NFR BLD: 54.8 % (ref 34–71.1)
PLATELET # BLD AUTO: 233 K/UL (ref 182–369)
PMV BLD AUTO: 9.4 FL (ref 7.4–10.4)
POTASSIUM SERPL-SCNC: 4.9 MMOL/L (ref 3.5–5.1)
PROT SERPL-MCNC: 6.8 G/DL (ref 6.4–8.3)
RBC # BLD AUTO: 5.1 M/UL (ref 3.93–5.22)
SODIUM SERPL-SCNC: 142 MMOL/L (ref 136–145)
WBC # BLD AUTO: 4.62 K/UL (ref 3.98–10.04)

## 2024-08-16 PROCEDURE — 85025 COMPLETE CBC W/AUTO DIFF WBC: CPT

## 2024-08-16 PROCEDURE — 99204 OFFICE O/P NEW MOD 45 MIN: CPT | Performed by: NURSE PRACTITIONER

## 2024-08-16 PROCEDURE — 80053 COMPREHEN METABOLIC PANEL: CPT

## 2024-08-16 PROCEDURE — 99212 OFFICE O/P EST SF 10 MIN: CPT

## 2024-08-16 PROCEDURE — 36415 COLL VENOUS BLD VENIPUNCTURE: CPT

## 2024-08-16 PROCEDURE — 1123F ACP DISCUSS/DSCN MKR DOCD: CPT | Performed by: NURSE PRACTITIONER

## 2024-08-22 ASSESSMENT — ENCOUNTER SYMPTOMS
NAUSEA: 0
EYE PAIN: 0
RESPIRATORY NEGATIVE: 1
VOMITING: 0
ABDOMINAL PAIN: 0
EYES NEGATIVE: 1
EYE DISCHARGE: 0
WHEEZING: 0
CONSTIPATION: 0
DIARRHEA: 0
SHORTNESS OF BREATH: 0
SORE THROAT: 0
EYE REDNESS: 0
BLOOD IN STOOL: 0
BACK PAIN: 0
GASTROINTESTINAL NEGATIVE: 1
COUGH: 0

## 2024-09-25 ENCOUNTER — OUTSIDE FACILITY SERVICE (OUTPATIENT)
Dept: GASTROENTEROLOGY | Facility: CLINIC | Age: 69
End: 2024-09-25
Payer: MEDICARE

## 2024-09-25 PROCEDURE — 88305 TISSUE EXAM BY PATHOLOGIST: CPT | Performed by: INTERNAL MEDICINE

## 2024-09-26 ENCOUNTER — LAB REQUISITION (OUTPATIENT)
Dept: LAB | Facility: HOSPITAL | Age: 69
End: 2024-09-26
Payer: MEDICARE

## 2024-09-26 DIAGNOSIS — K63.5 POLYP OF COLON: ICD-10-CM

## 2024-09-27 LAB
CYTO UR: NORMAL
LAB AP CASE REPORT: NORMAL
LAB AP CLINICAL INFORMATION: NORMAL
Lab: NORMAL
PATH REPORT.FINAL DX SPEC: NORMAL
PATH REPORT.GROSS SPEC: NORMAL

## 2024-11-07 DIAGNOSIS — Z17.0 MALIGNANT NEOPLASM OF UPPER-INNER QUADRANT OF RIGHT BREAST IN FEMALE, ESTROGEN RECEPTOR POSITIVE (HCC): ICD-10-CM

## 2024-11-07 DIAGNOSIS — C50.211 MALIGNANT NEOPLASM OF UPPER-INNER QUADRANT OF RIGHT BREAST IN FEMALE, ESTROGEN RECEPTOR POSITIVE (HCC): ICD-10-CM

## 2024-11-07 RX ORDER — LETROZOLE 2.5 MG/1
2.5 TABLET, FILM COATED ORAL DAILY
Qty: 90 TABLET | Refills: 3 | Status: SHIPPED | OUTPATIENT
Start: 2024-11-07

## 2024-12-10 NOTE — PROGRESS NOTES
abnormal kinetics in the inferior medial right  breast. This correlates with the biopsy-proven malignancy.  2. The upper outer and upper inner right breast known biopsy-proven  malignancies  do not demonstrate abnormal kinetics at this time.  3. Suspicious right axillary lymph node as above unchanged.  BI-RADS Final Assessment Category 6: Known Biopsy-Proven Malignancy  Management Recommendation: Surgical excision when clinically  appropriate.  Signed by Dr Lakhwinder Cruz    PATHOLOGY REVEALS:  FINAL DIAGNOSIS:     A.  Breast, right mastectomy:   1.  Infiltrating ductal carcinoma, no special type, grade 1.   2.  Residual invasive carcinoma measures 1.1 cm in greatest dimension.   3.  Invasive carcinoma is located 0.6 cm from the nearest deep surgical   excision margin.   4.  Low-grade ductal carcinoma in situ is identified in conjunction with   the invasive lesion.   5.  In situ carcinoma is located greater than 1.0 cm from the nearest   deep surgical excision margin.   6.  Sections of nipple, negative for evidence of malignancy.   7.  Sections of skin, negative for evidence of malignancy.   8.  Changes consistent with fibrocystic mastopathy as well as a small   benign intraductal papilloma are identified in the nonneoplastic breast   parenchyma.     B.  Breast, left mastectomy:   1.  Benign breast parenchyma with changes consistent with fibrocystic   mastopathy.   2.  Sections of nipple, negative for evidence of malignancy.   3.  Sections of skin, negative for evidence of malignancy.     C.  Lymph node, right sentinel lymph node biopsy: 1 lymph node, negative   for evidence of malignancy.     D.  Breast, excision of right breast advancement flap: Benign skin and   subcutaneous tissue.     E.  Breast, excision of left breast advancement flap: Benign skin and   subcutaneous tissue.     AJCC STAGE: ypT1c, (sn)pN0, pMx     The residual looks different that the original and I am going to repeat markers    PHYSICAL

## 2024-12-16 ENCOUNTER — OFFICE VISIT (OUTPATIENT)
Dept: SURGERY | Age: 69
End: 2024-12-16

## 2024-12-16 VITALS — BODY MASS INDEX: 26.07 KG/M2 | WEIGHT: 172 LBS | HEART RATE: 80 BPM | OXYGEN SATURATION: 98 % | HEIGHT: 68 IN

## 2024-12-16 DIAGNOSIS — Z17.0 MALIGNANT NEOPLASM OF LOWER-INNER QUADRANT OF RIGHT BREAST OF FEMALE, ESTROGEN RECEPTOR POSITIVE (HCC): ICD-10-CM

## 2024-12-16 DIAGNOSIS — C50.311 MALIGNANT NEOPLASM OF LOWER-INNER QUADRANT OF RIGHT BREAST OF FEMALE, ESTROGEN RECEPTOR POSITIVE (HCC): ICD-10-CM

## 2024-12-16 DIAGNOSIS — C50.211 MALIGNANT NEOPLASM OF UPPER-INNER QUADRANT OF RIGHT BREAST IN FEMALE, ESTROGEN RECEPTOR POSITIVE (HCC): ICD-10-CM

## 2024-12-16 DIAGNOSIS — Z17.0 MALIGNANT NEOPLASM OF UPPER-INNER QUADRANT OF RIGHT BREAST IN FEMALE, ESTROGEN RECEPTOR POSITIVE (HCC): ICD-10-CM

## 2024-12-16 DIAGNOSIS — Z90.13 STATUS POST BILATERAL MASTECTOMY: Primary | ICD-10-CM

## 2024-12-27 ENCOUNTER — OFFICE VISIT (OUTPATIENT)
Dept: OBSTETRICS AND GYNECOLOGY | Age: 69
End: 2024-12-27
Payer: MEDICARE

## 2024-12-27 VITALS
SYSTOLIC BLOOD PRESSURE: 128 MMHG | WEIGHT: 172 LBS | BODY MASS INDEX: 26.07 KG/M2 | DIASTOLIC BLOOD PRESSURE: 74 MMHG | HEIGHT: 68 IN

## 2024-12-27 DIAGNOSIS — Z01.419 WELL WOMAN EXAM WITH ROUTINE GYNECOLOGICAL EXAM: Primary | ICD-10-CM

## 2024-12-27 DIAGNOSIS — N95.0 PMB (POSTMENOPAUSAL BLEEDING): ICD-10-CM

## 2024-12-27 DIAGNOSIS — N89.8 VAGINAL MASS: ICD-10-CM

## 2024-12-27 PROBLEM — Z90.13 STATUS POST BILATERAL MASTECTOMY: Status: ACTIVE | Noted: 2023-08-24

## 2024-12-27 PROBLEM — C50.211 MALIGNANT NEOPLASM OF UPPER-INNER QUADRANT OF RIGHT FEMALE BREAST: Status: ACTIVE | Noted: 2022-12-21

## 2024-12-27 PROBLEM — R92.30 DENSE BREASTS: Status: ACTIVE | Noted: 2017-05-25

## 2024-12-27 NOTE — PROGRESS NOTES
Subjective   Karen Tipton is a 69 y.o. female  YOB: 1955        Chief Complaint   Patient presents with    Gynecologic Exam     Patient is here today to establish care and GYN follow up regarding PMB. Patient states this started 2 weeks ago and  bled for 2 days. Pt. States she now has a vaginal discharge. Patient had in office US today 12/27/24. Patient is concerned of cramping .  Patient states she has a drainage in peroneum area.          Gynecologic Exam  The patient's pertinent negatives include no pelvic pain. Pertinent negatives include no abdominal pain, back pain, constipation, diarrhea, dysuria, fever, frequency, hematuria, nausea, rash, sore throat, urgency or vomiting.       The following portions of the patient's history were reviewed and updated as appropriate: allergies, current medications, past family history, past medical history, past social history, past surgical history, and problem list.    No Known Allergies    Past Medical History:   Diagnosis Date    Breast cancer 2022    Colon polyp     Hypertension        Family History   Problem Relation Age of Onset    Cancer Father         protate    Stroke Father         My dad also had a stroke    Stroke Mother     Dementia Mother     Colon cancer Paternal Grandfather     Breast cancer Paternal Grandmother     Cancer Paternal Grandmother         Breast cancer    Ovarian cancer Neg Hx        Social History     Socioeconomic History    Marital status:    Tobacco Use    Smoking status: Never    Smokeless tobacco: Never   Vaping Use    Vaping status: Never Used   Substance and Sexual Activity    Alcohol use: Yes     Comment: Very rarely    Drug use: No    Sexual activity: Defer         Current Outpatient Medications:     letrozole (FEMARA) 2.5 MG tablet, Take 1 tablet by mouth Daily., Disp: , Rfl:     lisinopril-hydrochlorothiazide (PRINZIDE,ZESTORETIC) 10-12.5 MG per tablet, Take 1 tablet by mouth once daily, Disp: 90 tablet,  Rfl: 0    montelukast (SINGULAIR) 10 MG tablet, Take 1 tablet by mouth Every Night., Disp: , Rfl:   No current facility-administered medications for this visit.    No LMP recorded. Patient is postmenopausal.    Sexual History:           Could not be calculated    Past Surgical History:   Procedure Laterality Date    BREAST BIOPSY      COLONOSCOPY  06/10/2014    3 polyps destroyed    COLONOSCOPY N/A 08/05/2019    Procedure: COLONOSCOPY WITH ANESTHESIA;  Surgeon: Ismael Ramachandran MD;  Location: [facility] ENDOSCOPY;  Service: Gastroenterology    MASTECTOMY Bilateral        Review of Systems   Constitutional:  Negative for activity change, appetite change, fatigue, fever, unexpected weight gain and unexpected weight loss.   HENT:  Negative for congestion, ear pain, hearing loss, nosebleeds, rhinorrhea, sore throat, tinnitus and trouble swallowing.    Eyes:  Negative for blurred vision, pain, discharge, itching and visual disturbance.   Respiratory:  Negative for apnea, chest tightness, shortness of breath and wheezing.    Cardiovascular:  Negative for chest pain and leg swelling.   Gastrointestinal:  Negative for abdominal pain, blood in stool, constipation, diarrhea, nausea, vomiting and GERD.   Endocrine: Negative for heat intolerance, polydipsia and polyuria.   Genitourinary: Negative.  Negative for breast lump, decreased libido, difficulty urinating, dyspareunia, dysuria, frequency, genital sores, hematuria, menstrual problem, pelvic pain, urgency, urinary incontinence and vaginal pain.   Musculoskeletal:  Negative for arthralgias, back pain, joint swelling and myalgias.   Skin:  Negative for color change, rash and skin lesions.   Allergic/Immunologic: Negative for environmental allergies, food allergies and immunocompromised state.   Neurological:  Negative for dizziness, tremors, seizures, syncope, facial asymmetry, numbness and headache.   Hematological:  Negative for adenopathy. Does not bruise/bleed easily.    Psychiatric/Behavioral:  Negative for agitation, hallucinations, sleep disturbance, suicidal ideas and depressed mood. The patient is not nervous/anxious.        Objective   Physical Exam  Vitals reviewed.   Constitutional:       General: She is not in acute distress.     Appearance: She is well-developed. She is not ill-appearing.   HENT:      Head: Normocephalic.      Right Ear: External ear normal.      Left Ear: External ear normal.      Nose: Nose normal.      Mouth/Throat:      Pharynx: No oropharyngeal exudate.   Eyes:      General: No scleral icterus.        Right eye: No discharge.         Left eye: No discharge.      Conjunctiva/sclera: Conjunctivae normal.      Pupils: Pupils are equal, round, and reactive to light.   Neck:      Thyroid: No thyroid mass or thyromegaly.   Cardiovascular:      Rate and Rhythm: Normal rate and regular rhythm.      Heart sounds: Normal heart sounds. No murmur heard.  Pulmonary:      Effort: Pulmonary effort is normal. No respiratory distress.      Breath sounds: Normal breath sounds. No wheezing or rales.   Chest:      Chest wall: No tenderness.   Abdominal:      General: Bowel sounds are normal. There is no distension.      Palpations: Abdomen is soft. There is no mass.      Tenderness: There is no abdominal tenderness. There is no guarding or rebound.      Hernia: No hernia is present.   Genitourinary:     Exam position: Prone.      Labia:         Right: No rash, tenderness or lesion.         Left: No rash, tenderness, lesion or injury.       Vagina: Normal. No vaginal discharge or tenderness.      Cervix: No cervical motion tenderness, discharge or friability.      Uterus: Not enlarged and not tender.       Adnexa:         Right: No mass or tenderness.          Left: No mass or tenderness.        Comments: Urethra and urethral meatus normal.    Bladder - normal, no prolapse.  Perineum and rectum examined - intact and no lesions.    Musculoskeletal:         General: No  "tenderness or deformity. Normal range of motion.      Cervical back: Normal range of motion and neck supple.   Lymphadenopathy:      Cervical: No cervical adenopathy.   Skin:     General: Skin is warm and dry.      Coloration: Skin is not pale.      Findings: No erythema or rash.   Neurological:      Mental Status: She is alert and oriented to person, place, and time.      Motor: No abnormal muscle tone.      Coordination: Coordination normal.      Deep Tendon Reflexes: Reflexes are normal and symmetric.   Psychiatric:         Behavior: Behavior normal. Behavior is cooperative.         Thought Content: Thought content normal.         Judgment: Judgment normal.           Vitals:    12/27/24 1058   BP: 128/74   Weight: 78 kg (172 lb)   Height: 172.7 cm (68\")       Diagnoses and all orders for this visit:    1. Well woman exam with routine gynecological exam (Primary)  Comments:  New patient normal well woman exam.  H/L breast cancer with bilateral mastectomy.    2. PMB (postmenopausal bleeding)  Comments:  Patient had 2 days of spotting ~2 weeks ago.  Now resolved.  Ultrasound done today shows a 3.7 mm endothickness.  To follow-up with Dr. Marlow.    3. Vaginal mass  Comments:  An approximate 1 cm x 4 cm vaginal mass noted on speculum exam.  The source of origin could not be identified.  Is not bleeding.  To follow-up with Dr. Marlow.        Normal GYN exam. Will have lab work here. Encouraged SBE.  Pt is aware how to do self breast exam and the importance of same. Discussed weight management and importance of maintaining a healthy weight. Discussed Vitamin D intake and the importance of adequate vitamin D for both bone health and a healthy immune system.  Discussed daily exercise and the importance of same in regards to a healthy heart as well as helping to maintain her weight and improving her mental health.  Body mass index is 26.15 kg/m². Colonoscopy is up to date.  Mammogram will be scheduled at outside facility. " Pap smear is done per ASCCP guidelines.                  Non-Smoker    MyChart Instructions Given

## 2024-12-31 ENCOUNTER — OFFICE VISIT (OUTPATIENT)
Dept: OBSTETRICS AND GYNECOLOGY | Age: 69
End: 2024-12-31
Payer: MEDICARE

## 2024-12-31 VITALS
DIASTOLIC BLOOD PRESSURE: 68 MMHG | HEIGHT: 68 IN | BODY MASS INDEX: 25.85 KG/M2 | WEIGHT: 170.6 LBS | SYSTOLIC BLOOD PRESSURE: 126 MMHG

## 2024-12-31 DIAGNOSIS — Z12.4 SCREENING FOR MALIGNANT NEOPLASM OF CERVIX: ICD-10-CM

## 2024-12-31 DIAGNOSIS — N89.8 VAGINAL DISCHARGE: ICD-10-CM

## 2024-12-31 DIAGNOSIS — N95.0 PMB (POSTMENOPAUSAL BLEEDING): Primary | ICD-10-CM

## 2024-12-31 DIAGNOSIS — N84.1 CERVICAL POLYP: ICD-10-CM

## 2024-12-31 PROCEDURE — 87624 HPV HI-RISK TYP POOLED RSLT: CPT | Performed by: OBSTETRICS & GYNECOLOGY

## 2024-12-31 PROCEDURE — G0123 SCREEN CERV/VAG THIN LAYER: HCPCS | Performed by: OBSTETRICS & GYNECOLOGY

## 2024-12-31 PROCEDURE — 88305 TISSUE EXAM BY PATHOLOGIST: CPT | Performed by: OBSTETRICS & GYNECOLOGY

## 2024-12-31 NOTE — PROGRESS NOTES
"    Panchito Marlow MD  [facility]  [address]  [place] [address]  Office [phone]  Fax [phone]      [facility]  Karen Tipton  : 1955  MRN: [id number]    Subjective   Subjective     Chief Complaint   Patient presents with    Follow-up     Pt here for PMB and vaginal mass. Seen by Alejandra .       History of Present Illness  Karen Tipton is a 69 y.o. female , , who comes to the office today for postmenopausal bleeding and vaginal mass. Noted on annual exam recently performed in clinic. Bleeding started >2 weeks ago. More than spotting.  Bleeding currently and having discharge. No odor or pruritus.    Review of Systems   Genitourinary:  Positive for vaginal bleeding and vaginal discharge. Negative for decreased urine volume, difficulty urinating, dyspareunia, dysuria, enuresis, flank pain, frequency, genital sores, hematuria, menstrual problem, pelvic pain, urgency and vaginal pain.   All other systems reviewed and are negative.       The following portions of the patient's history were reviewed and updated as appropriate: allergies, current medications, past family history, past medical history, past social history, past surgical history, and problem list.    Objective    Objective     Vitals:   Visit Vitals  /68   Ht 172.7 cm (68\")   Wt 77.4 kg (170 lb 9.6 oz)   BMI 25.94 kg/m²        Physical Exam  Vitals reviewed. Exam conducted with a chaperone present.   Constitutional:       General: She is not in acute distress.     Appearance: Normal appearance. She is not ill-appearing.   HENT:      Head: Normocephalic and atraumatic.      Nose: No congestion or rhinorrhea.   Eyes:      General: No scleral icterus.        Right eye: No discharge.         Left eye: No discharge.      Extraocular Movements: Extraocular movements intact.      Conjunctiva/sclera: Conjunctivae normal.   Pulmonary:      Effort: Pulmonary effort is normal. No accessory muscle usage or respiratory " distress.   Abdominal:      Hernia: There is no hernia in the left inguinal area or right inguinal area.   Genitourinary:     General: Normal vulva.      Pubic Area: No rash or pubic lice.       Labia:         Right: No rash, tenderness, lesion or injury.         Left: No rash, tenderness, lesion or injury.       Urethra: No prolapse, urethral pain, urethral swelling or urethral lesion.      Vagina: No signs of injury and foreign body. Vaginal discharge (blood tinged discharge) present. No erythema, tenderness, bleeding, lesions or prolapsed vaginal walls.      Cervix: Lesion and cervical bleeding present. No cervical motion tenderness, discharge, friability, erythema or eversion.      Comments: Polyp - necrotic appearing protruding through hymen. On speculum exam, polyp continues to the internal cervical os. Polyp is 1 cm in diameter and 4 cm in length  Musculoskeletal:      Right lower leg: No edema.      Left lower leg: No edema.   Lymphadenopathy:      Lower Body: No right inguinal adenopathy. No left inguinal adenopathy.   Skin:     General: Skin is warm and dry.      Coloration: Skin is not ashen, cyanotic or jaundiced.   Neurological:      General: No focal deficit present.      Mental Status: She is alert and oriented to person, place, and time.   Psychiatric:         Mood and Affect: Mood normal.         Behavior: Behavior is cooperative.         Procedure   The following procedures were performed in the clinic today:  Biopsy Cervix    Date/Time: 12/31/2024 9:45 AM    Performed by: Panchito Marlow MD  Authorized by: Panchito Marlow MD  Consent: Verbal consent obtained.  Risks and benefits: risks, benefits and alternatives were discussed  Consent given by: patient  Patient identity confirmed: verbally with patient  Preparation: Patient was prepped and draped in the usual sterile fashion.  Local anesthesia used: no    Anesthesia:  Local anesthesia used: no    Sedation:  Patient sedated: no    Patient  tolerance: patient tolerated the procedure well with no immediate complications  Comments: Cervical polyp visualized, grasped with ring forceps and avulsed. Sent for pathology. Hemostasis noted.       Endometrial Biopsy    Date/Time: 12/31/2024 9:46 AM    Performed by: Panchito Marlow MD  Authorized by: Panchito Marlow MD    Consent:     Consent obtained: verbal    Consent given by: patient    Risks discussed: bleeding, infection, need for repeat procedure and pain    Alternatives discussed: alternative treatment    Patient agrees, verbalizes understanding, and wants to proceed: yes    Universal protocol:     Immediately prior to procedure a time out was called: yes      Patient identity confirmed: verbally with patient  Pre-procedure:     Urine pregnancy test: N/A    Procedure:     Prepped with: Betadine    Tenaculum used: no      A local block was performed: no      Cervix dilated: no      Number of passes: 2  Findings:     Cervix: normal      Uterus depth by sound (cm): 7    Specimen collected: specimen collected and sent to pathology      Patient tolerance: tolerated well, no immediate complications           Result Review    US Non-ob Transvaginal (12/27/2024 10:43)   1.  Uterus: Small/atrophic, with uterine dimensions 5.6 x 4.5 x 3.1 cm, and Anteverted     2.  Endometrium:  3.7 mm     3.  Myometrium: Normal homogenous texture     4.  Ovaries  Left:    Normal/unremarkable, measures 2.5 x 1.7 x 1.3 cm  Right:  Not visualized        Assessment & Plan   Assessment / Plan     Diagnoses and all orders for this visit:    1. PMB (postmenopausal bleeding) (Primary)  -     Liquid-based Pap Smear, Screening  -     Tissue Pathology Exam  -     Tissue Pathology Exam    2. Cervical polyp  -     Liquid-based Pap Smear, Screening  -     Tissue Pathology Exam  -     Tissue Pathology Exam    3. Screening for malignant neoplasm of cervix  -     Liquid-based Pap Smear, Screening  -     Tissue Pathology Exam  -     Tissue  Pathology Exam    4. Vaginal discharge  -     NuSwab VG+ - Swab, Vagina    Other orders  -     Biopsy Cervix  -     Endometrial Biopsy        Discussion:   Exam findings confirmed with patient. Polyp removed. EMB and pap smear recommend to rule out other sources of bleeding. I suspect that polyp was source of bleeding. Post-care discussed. Call with results. Discussed if malignancy, will refer to GYN oncology. If not malignant, discussed monitoring expectantly vs consideration of surgical intervention (ie hysterectomy) given the uterus is still responding to hormones. Questions answered. She expressed understanding.     Follow-up: Return in about 1 week (around 1/7/2025), or if symptoms worsen or fail to improve, for GYN Follow-up.    Panchito Marlow MD

## 2025-01-02 LAB
A VAGINAE DNA VAG QL NAA+PROBE: NORMAL SCORE
BVAB2 DNA VAG QL NAA+PROBE: NORMAL SCORE
C ALBICANS DNA VAG QL NAA+PROBE: NEGATIVE
C GLABRATA DNA VAG QL NAA+PROBE: NEGATIVE
C TRACH DNA SPEC QL NAA+PROBE: NEGATIVE
CYTO UR: NORMAL
CYTO UR: NORMAL
GEN CATEG CVX/VAG CYTO-IMP: NORMAL
HPV I/H RISK 4 DNA CVX QL PROBE+SIG AMP: NOT DETECTED
LAB AP CASE REPORT: NORMAL
LAB AP CLINICAL INFORMATION: NORMAL
LAB AP CLINICAL INFORMATION: NORMAL
LAB AP GYN ADDITIONAL INFORMATION: NORMAL
LAB AP GYN OTHER FINDINGS: NORMAL
Lab: NORMAL
MEGA1 DNA VAG QL NAA+PROBE: NORMAL SCORE
N GONORRHOEA DNA VAG QL NAA+PROBE: NEGATIVE
PATH INTERP SPEC-IMP: NORMAL
PATH REPORT.FINAL DX SPEC: NORMAL
PATH REPORT.FINAL DX SPEC: NORMAL
PATH REPORT.GROSS SPEC: NORMAL
PATH REPORT.GROSS SPEC: NORMAL
STAT OF ADQ CVX/VAG CYTO-IMP: NORMAL
T VAGINALIS DNA VAG QL NAA+PROBE: NEGATIVE

## 2025-01-06 PROBLEM — Z90.13 S/P BILATERAL MASTECTOMY: Status: ACTIVE | Noted: 2025-01-06

## 2025-01-06 PROBLEM — C50.911 MALIGNANT NEOPLASM OF RIGHT FEMALE BREAST: Status: ACTIVE | Noted: 2025-01-06

## 2025-01-06 NOTE — PROGRESS NOTES
[facility]  Radiation Oncology Clinic   Stephane Olguin MD, FACR  Alex LANIER  _______________________________________________  [facility]  Department of Radiation Oncology  [address] [place] [id number]  Office: [phone]  Fax: [phone]    DATE: 01/08/2025  PATIENT: Karen Tipton  1955                         MEDICAL RECORD #: [id number]    1. Malignant neoplasm of right female breast, unspecified estrogen receptor status, unspecified site of breast    2. S/P bilateral mastectomy    3. S/P lymph node biopsy    4. Prophylactic use of letrozole    5. History of radiation therapy    6. Non-smoker                                             REASON FOR VISIT:    Chief Complaint   Patient presents with    Breast Cancer     Karen Tipton is a very pleasant 69 y.o. patient that has completed radiation therapy for carcinoma of the breast and returns to the clinic today for routine follow up exam.  Denies appetite change, unexpected weight change, nasuea/vomiting, diarrhea, light-headedness, weakness, and headaches. She continues to follow  and Gricel Blood, APRN - oncology.    HISTORY OF PRESENT ILLNESS  Diagnosed with Stage IIIA (T3, N1, cM0) ER+ IDC of right breast. She completed 6040 cGy in 33 fractions to the right chest wall 1/15/2023.    01/14/2019 - Right breast, 11:00, core biopsies:   Benign fibroadenomatoid changes with associated microcalcifications.     12/08/2022 - Bilateral Diagnostic Mammogram/Right Breast Ultrasound due to palpable right breast mass:  Right breast, BI-RADS 5, palpable mass is highly suggestive of a breast malignancy. Recommendation is for percutaneous core needle biopsy.   Immediately adjacent second satellite mass is also highly suggestive of a breast malignancy. Recommendation is for appropriate action.   Additional third mass in a separate quadrant at 4:00 is highly suggestive of a breast  malignancy. Recommendation is for percutaneous core needle biopsy.   At least 2 abnormal axillary lymph nodes are suspicious for metastatic involvement. Recommendation is for fine needle aspiration versus nonvacuum percutaneous biopsy per performing physician preference.   Given the constellation of findings, there is high suggestion of multicentric malignancy. Given the multiple findings recommendation would be for a biopsy of the main palpable mass as well as an additional biopsy of the mass at 4:00 in a separate quadrant to prove multicentric malignancy.   Additional recommendation as described above would be for testing of the abnormal axillary lymph nodes. This was all discussed in detail with the patient. She will be seen by Annamaria immediately following this appointment and Annamaria is aware.   Overall assessment, BI-RADS 5, highly suggestive of malignancy with recommendation for biopsies.    12/19/2022 - Right breast biopsy:  Right breast, 1:00, core biopsies:   Invasive carcinoma of no special type (ductal), grade 3.   Largest contiguous area of tumor measures 16 mm.   ER +, NH -, Her/2 2+ equivocal, FISH negative  Luminal B/high risk  Right breast, 4:00, core biopsies:   Invasive carcinoma of no special type (ductal), grade 2.   Largest contiguous area of tumor measures 10 mm.   ER +, NH +, Her 2/ 2+ equivocal, FISH negative  Luminal B/high risk    01/11/2023 - MRI Bilateral Breasts with and without contrast:  RIGHT breast upper inner quadrant dominant mass, consistent with biopsy proven malignancy. There is nonmass enhancement extending to the medial skin, which appears to have subtle thickening, concerning for invasion.   RIGHT breast lower inner quadrant 4:00 position mass, consistent with biopsy proven malignancy. Note that the posterior aspect of the mass is 0.4 cm from the chest wall.   RIGHT breast upper outer quadrant with a 0.8 cm circumscribed mass, concerning for satellite disease.   Overall  appearance of the RIGHT breast concerning for multicentric disease.   Suspicious low-lying RIGHT axillary lymph node as above. Although this was negative by fine-needle aspiration, the imaging appearance remains suspicious.   No internal mammary or LEFT axillary adenopathy.   LEFT 3 cm thyroid nodule, not optimally evaluated by MRI. If not previously performed, consider nonemergent thyroid ultrasound.   BI-RADS Final Assessment Category 6: Known Biopsy-Proven Malignancy   Management Recommendation: Surgical excision when clinically appropriate.     01/18/2023 - Genetic Testing:  Negative for 81 out of 81 genes.   A variant of uncertain significance (VUS) was detected in the BRIP1 gene(s). A VUS means that a change in the DNA was detected, but there is not enough information to determine whether or not the change increases the risk of cancer. The American College [employer] (ACMG) states that VUS should NOT be used in clinical decision making.   Please see below for additional findings.   VUS:   Gene: BRIP1 , Variant: c.205G>A (p.G69R) Note: A heterozygous variant of uncertain significance (VUS) was detected in the BRIP1 gene as tabulated above.     01/20/2023 - CT Abdomen/Pelvis with contrast:  Small sliding hiatal hernia is seen. Mild thickening versus an element of underdistension is seen in the cecum and ascending colon. This could reflect an element of colitis due to infectious / inflammatory process.   No metastatic lesions are seen. No ascites.     01/20/2023 - CT Chest with contrast:  Multiple small subpleural nodules in the right upper lung field of indeterminate significance. Scattered borderline nonspecific intrathoracic adenopathy, as above. Suggest a follow-up chest CT in 3 months, and/or comparison with priors, if clinically appropriate.   Lobulated mass lesions with punctate calcifications in the right breast. These are consistent with known malignant neoplasm of the right breast.  Consider correlation with dedicated ultrasound and mammography, if clinically appropriate.   A heterogenous hypodense nodule in the left lobe of the thyroid gland, and cannot exclude a small right thyroid nodule, as above, for which correlation with ultrasound is recommended.     01/20/2023 - Bone Scan:  There is no evidence of osseous metastatic disease.     01/25/2023 - Consult with :  Essentially, clinical T3 N1 M0 disease. I recommend neoadjuvant chemotherapy.   Treatment consent signed for dose dense Adriamycin Cyclophosphamide + GCSF every 2 weeks x 4 cycles followed by weekly Taxol x 12 weeks     02/15/2023 - 06/09/2023 - Chemotherapy course:  Adriamycin/Cyclophosphamide every 2 weeks x 4, followed by weekly Taxol x 9  Chemo stopped due to development of neuropathy. 3 remaining cycles of Taxol omitted.    05/09/2023 - Right Breast Ultrasound:  Right breast, BI-RADS 5, highly suggestive of malignancy involving the non biopsied mass at 1:00 4 cm from the nipple. This shows interim decrease in size most consistent with response to neoadjuvant chemotherapy. As this has not been biopsied, the finding remains highly suggestive of a malignancy. Recommend biopsy, clip placement, or management based on surgical plan.   Biopsy-proven malignant mass at 1:00 5 cm from the nipple has decreased in size, estimated to have decreased by maybe 50%.   Biopsy-proven malignant mass at 4:00 11 cm from the nipple has only slightly decreased in size from approximately 13 mm to 10 mm.   The right axilla was not imaged with today's ultrasound, please refer to prior prebiopsy ultrasound and MRI and fine-needle aspiration performed on December 19, 2022. Appropriate management of the axilla should be based on current surgical management standards for staging of the axilla with an ipsilateral breast malignancy.   Recommend appropriate action based on plans for clinical and surgical management as described above.   Overall  assessment BI-RADS 5, highly suggestive of malignancy.     06/09/2023 - Hormonal Therapy:  Letrozole    07/21/2023 - CT Chest with contrast:  Decreased size of right breast mass.   Stable pulmonary nodules.   No evidence of new or worsening disease.     07/24/2023 - MRI Bilateral Breasts with and without contrast:  Persistent area of abnormal kinetics in the inferior medial right breast. This correlates with the biopsy-proven malignancy.   The upper outer and upper inner right breast known biopsy-proven malignancies  do not demonstrate abnormal kinetics at this time.   Suspicious right axillary lymph node as above unchanged.   BI-RADS Final Assessment Category 6: Known Biopsy-Proven Malignancy   Management Recommendation: Surgical excision when clinically appropriate.     07/24/2023 - Right Diagnostic Mammogram/Ultrasound:  Known biopsy-proven breast cancer. BI-RADS 6.   RECOMMENDATION: Surgical follow-up.     08/15/2023 -  Bilateral mastectomies and right sentinel lymph node biopsy per :  Breast, right mastectomy:   Infiltrating ductal carcinoma, no special type, grade 1.   Residual invasive carcinoma measures 1.1 cm in greatest dimension.   Invasive carcinoma is located 0.6 cm from the nearest deep surgical excision margin.   Low-grade ductal carcinoma in situ is identified in conjunction with the invasive lesion.   In situ carcinoma is located greater than 1.0 cm from the nearest deep surgical excision margin.   Sections of nipple, negative for evidence of malignancy.   Sections of skin, negative for evidence of malignancy.   Changes consistent with fibrocystic mastopathy as well as a small benign intraductal papilloma are identified in the nonneoplastic breast parenchyma.   Breast, left mastectomy:   Benign breast parenchyma with changes consistent with fibrocystic mastopathy.   Sections of nipple, negative for evidence of malignancy.   Sections of skin, negative for evidence of malignancy.   Lymph  node, right sentinel lymph node biopsy:   1 lymph node, negative for evidence of malignancy.   Breast, excision of right breast advancement flap:   Benign skin and subcutaneous tissue.   Breast, excision of left breast advancement flap:   Benign skin and subcutaneous tissue.   AJCC STAGE: ypT1c, (sn)pN0, pMx     08/24/2023 - Appointment with :  Clinical T3 N0 M0->ypT1N0. Stage IIIa invasive ductal carcinoma, 1:00 right breast, Grade 3, ER 99%, NJ 0, HER-2 2+/equivocal,  FISH-negative, Ki67 55%, MammaPrint: Luminal B/high risk, Dec 2022. Invasive ductal carcinoma, 4:00 right breast, Grade 2, ER 96%, %, HER-2 2+/equivocal, FISH-negative, Ki67 15%, MammaPrint: Luminal B/high risk, Dec 2022  -Essentially, locally advanced disease.  -S/p neoadjuvant chemo: ddAC x 4 followed by Taxol x 9 weekly cycles  -8/15/23 Bilateral breast mastectomy: AJCC STAGE: ypT1c, (sn)pN0, M0  Plan:  -Continue adjuvant Letrozole x 5 years, at least consider breast cancer index at year 5  -Referral Dr Stephane Tabor opinion adjuvant RT  PLAN:  RTC with MD 4 month for surveillance follow-up  CBC today  Recommend Bone Density to monitor for endocrine therapy  Refer to  at [facility] for radiation therapy  Femara 2.5 mg PO daily to be started in 2 weeks-script sent  Continue follow-up with YANNICK Leavitt/[facility] ENT f  Continue follow-up with /[facility]  Ok to remove port  Return in about 4 months (around 12/24/2023) for CBC, Appointment with Dr. Barba.  Sched Bone Density  Refer to  at [facility] for radiation therapy    08/30/2023 - Appointment with :  PLAN: Follow-up in 2 to 3 weeks for physical exam     09/13/2023 - Consult with :  We have discussed the role of radiation therapy with this diagnosis as well as the indications and rationale of adjuvant radiation therapy according to the NCCN Guidelines. She has verbalized understanding of our conversation and agrees to the treatment  recommendations for postoperative radiation therapy to the right chest wall and involved lymph nodes. following Simple Mastectomy. I anticipate a dose of 5040 cGy with 1000 cGy boost to the tumor bed for a total of 6040 cGy/33 fractions.. We will simulate treatment fields to begin the treatment planning, final dose to be determined.  Discussed post surgery and radiation risk for lymphedema, referral will be placed to lymphedema clinic for initial evaluation/education, she will continue ongoing management per primary care physician and other specialists. Thank you for allowing me to assist in her care.     10/02/2023 - 11/15/2023 - Completed radiation course:  Received 6040 cGy in 33 fractions to the right chest wall via External Beam Radiation - EBRT.     12/18/2023 - Appointment with :  PLAN: I will see her back in 6 month for exam. Try and schedule her for port removal before the end of the year.    01/03/2024 - Appointment with :  Follow up in one year    08/22/2024 - Appointment with YANNICK Nur - oncology:  FOLLOW UP:  Follow-up appointment made for 6 months, or sooner, if needed breast cancer  Continue to follow with other medical providers as recommended  Labs at next visit: CBC and CMP    12/16/2024 - Appointment with :  Follow-up in 6 months for physical exam only    History obtained from  PATIENT and CHART    PAST MEDICAL HISTORY  Past Medical History:   Diagnosis Date    Breast cancer 2022    Colon polyp     Hypertension       PAST SURGICAL HISTORY  Past Surgical History:   Procedure Laterality Date    BREAST BIOPSY      COLONOSCOPY  06/10/2014    3 polyps destroyed    COLONOSCOPY N/A 08/05/2019    Procedure: COLONOSCOPY WITH ANESTHESIA;  Surgeon: Ismael Ramachandran MD;  Location: [facility] ENDOSCOPY;  Service: Gastroenterology    MASTECTOMY Bilateral       FAMILY HISTORY  family history includes Breast cancer in her paternal grandmother; Cancer in her father and paternal  "grandmother; Colon cancer in her paternal grandfather; Dementia in her mother; Stroke in her father and mother.    SOCIAL HISTORY  Social History     Tobacco Use    Smoking status: Never    Smokeless tobacco: Never   Vaping Use    Vaping status: Never Used   Substance Use Topics    Alcohol use: Yes     Comment: Very rarely    Drug use: No      ALLERGIES  Patient has no known allergies.     MEDICATIONS  Current Outpatient Medications   Medication Sig Dispense Refill    letrozole (FEMARA) 2.5 MG tablet Take 1 tablet by mouth Daily.      lisinopril-hydrochlorothiazide (PRINZIDE,ZESTORETIC) 10-12.5 MG per tablet Take 1 tablet by mouth once daily 90 tablet 0    montelukast (SINGULAIR) 10 MG tablet Take 1 tablet by mouth Every Night.       No current facility-administered medications for this visit.      The following portions of the patient's history were reviewed and updated as appropriate: allergies, current medications, past family history, past medical history, past social history, past surgical history and problem list.    REVIEW OF SYSTEMS  Review of Systems   Constitutional: Negative.  Negative for fatigue.   HENT: Negative.     Respiratory: Negative.     Cardiovascular: Negative.         No pacemaker or cgm   Gastrointestinal: Negative.    Endocrine:        No cgm   Genitourinary: Negative.    Musculoskeletal: Negative.    Skin: Negative.    Neurological: Negative.    Hematological: Negative.  Negative for adenopathy.   Psychiatric/Behavioral: Negative.     All other systems reviewed and are negative.    I have reviewed and confirmed the accuracy of the ROS as documented by the MA/LPN/RN YANNICK Woody    PHYSICAL EXAM  VITAL SIGNS:   Vitals:    01/08/25 1356   BP: 166/72   Pulse: 90   Resp: 18   SpO2: 99%   Weight: 78.9 kg (174 lb)   Height: 172.7 cm (68\")   PainSc: 0-No pain      Physical Exam  Vitals reviewed.   Constitutional:       Appearance: Normal appearance.   HENT:      Head: Normocephalic.      " Nose: Nose normal.   Eyes:      Pupils: Pupils are equal, round, and reactive to light.   Cardiovascular:      Rate and Rhythm: Normal rate and regular rhythm.      Pulses: Normal pulses.      Heart sounds: Normal heart sounds.   Pulmonary:      Effort: Pulmonary effort is normal. No respiratory distress.      Breath sounds: Normal breath sounds. No wheezing.   Chest:      Comments: S/ bilateral mastectomies. Right chest wall s/p radiation therapy; slight hyperpigmentation noted. No palpable masses noted.   Abdominal:      General: Bowel sounds are normal.      Palpations: There is no mass.   Musculoskeletal:         General: Normal range of motion.      Cervical back: Normal range of motion and neck supple. No tenderness.   Lymphadenopathy:      Cervical: No cervical adenopathy.      Upper Body:      Right upper body: No supraclavicular, axillary or pectoral adenopathy.      Left upper body: No supraclavicular, axillary or pectoral adenopathy.   Skin:     General: Skin is warm and dry.      Capillary Refill: Capillary refill takes less than 2 seconds.   Neurological:      General: No focal deficit present.      Mental Status: She is alert and oriented to person, place, and time.      Motor: No weakness.   Psychiatric:         Mood and Affect: Mood normal.         Behavior: Behavior normal.         Performance Status: ECOG (0) Fully active, able to carry on all predisease performance without restriction    Clinical Quality Measures  - Pain Documented by Standardized Tool, FPS Karen J Lamontleatha reports a pain score of 0. Given her pain assessment as noted, treatment options were discussed and the following options were decided upon as a follow-up plan to address the patient's pain:  No pain, no plan given .    - Body Mass Index Screening and Follow-Up Plan  BMI is >= 25 and <30. (Overweight) The following options were offered after discussion;: none (medical contraindication)    - Tobacco Use: Screening and Cessation  Intervention  Social History    Tobacco Use      Smoking status: Never      Smokeless tobacco: Never    - Advanced Care Planning Advance Care Planning   ACP discussion was held with the patient during this visit. Patient has an advance directive in EMR which is still valid.     - PHQ-2 Depression Screening:  Little interest or pleasure in doing things? Not at all   Feeling down, depressed, or hopeless? Not at all   PHQ-2 Total Score 0     ASSESSMENT AND PLAN  1. Malignant neoplasm of right female breast, unspecified estrogen receptor status, unspecified site of breast    2. S/P bilateral mastectomy    3. S/P lymph node biopsy    4. Prophylactic use of letrozole    5. History of radiation therapy    6. Non-smoker      No orders of the defined types were placed in this encounter.    Recommendations:  Karen Tipton is status post completion of adjuvant radiation therapy to the right chest wall and presents to our clinic today for follow up exam. Diagnosed with Stage IIIA (T3, N1, cM0) ER+ IDC of right breast. She completed 6040 cGy in 33 fractions to the right chest wall 1/15/2023.    We discussed expected post radiation long and short term effects, monthly self exams and NCCN surveillance recommendations. She is doing well and has no evidence of recurrent or metastatic disease today. She continues on letrozole per medical oncology. Continue ongoing management with other physicians, will follow up with us in one year or sooner if needed.    Patient Instructions   1) return in year    Return in about 1 year (around 1/8/2026).     Time Spent: I spent 42 minutes caring for Karen on this date of service. This time includes time spent by me in the following activities: preparing for the visit, reviewing tests, obtaining and/or reviewing a separately obtained history, performing a medically appropriate examination and/or evaluation, counseling and educating the patient/family/caregiver, ordering medications, tests, or  procedures, and documenting information in the medical record.   Alex Yadav, APRN  01/08/2025

## 2025-01-07 ENCOUNTER — OFFICE VISIT (OUTPATIENT)
Dept: OBSTETRICS AND GYNECOLOGY | Age: 70
End: 2025-01-07
Payer: MEDICARE

## 2025-01-07 ENCOUNTER — HOSPITAL ENCOUNTER (OUTPATIENT)
Dept: RADIATION ONCOLOGY | Facility: HOSPITAL | Age: 70
Setting detail: RADIATION/ONCOLOGY SERIES
End: 2025-01-07
Payer: MEDICARE

## 2025-01-07 VITALS
SYSTOLIC BLOOD PRESSURE: 116 MMHG | DIASTOLIC BLOOD PRESSURE: 84 MMHG | HEIGHT: 68 IN | WEIGHT: 175 LBS | BODY MASS INDEX: 26.52 KG/M2

## 2025-01-07 DIAGNOSIS — Z79.811 USE OF LETROZOLE (FEMARA): ICD-10-CM

## 2025-01-07 DIAGNOSIS — N95.0 PMB (POSTMENOPAUSAL BLEEDING): Primary | ICD-10-CM

## 2025-01-07 DIAGNOSIS — Z85.3 HISTORY OF BREAST CANCER: ICD-10-CM

## 2025-01-07 DIAGNOSIS — N84.1 CERVICAL POLYP: ICD-10-CM

## 2025-01-07 NOTE — PROGRESS NOTES
"    Panchito Marlow MD  [facility]  [address]  [place] [address]  Office [phone]  Fax [phone]      [facility]  Karen Tipton  : 1955  MRN: [id number]    Subjective   Subjective     Chief Complaint   Patient presents with    Follow-up     Pt here for f/u on PMB, EMB and cervical polyp. Biopsy benign. Has not had anymore bleeding since polyp removal.       History of Present Illness  Karen Tipton is a 69 y.o. female , , who comes to the office today for follow-up. Had EMB and cervical polyp removed at last visit. No further bleeding since. Had a few days of spotting after biopsies but none since. No pelvic pain.      Review of Systems   Genitourinary:  Negative for decreased urine volume, difficulty urinating, dyspareunia, dysuria, enuresis, flank pain, frequency, genital sores, hematuria, menstrual problem, pelvic pain, urgency, vaginal bleeding, vaginal discharge and vaginal pain.   All other systems reviewed and are negative.       The following portions of the patient's history were reviewed and updated as appropriate: allergies, current medications, past family history, past medical history, past social history, past surgical history, and problem list.    Objective    Objective     Vitals:   Visit Vitals  /84   Ht 172.7 cm (68\")   Wt 79.4 kg (175 lb)   BMI 26.61 kg/m²        Physical Exam  Vitals reviewed.   Constitutional:       General: She is not in acute distress.     Appearance: Normal appearance. She is not ill-appearing.   HENT:      Head: Normocephalic and atraumatic.      Nose: No congestion or rhinorrhea.   Eyes:      General: No scleral icterus.        Right eye: No discharge.         Left eye: No discharge.      Extraocular Movements: Extraocular movements intact.      Conjunctiva/sclera: Conjunctivae normal.   Pulmonary:      Effort: Pulmonary effort is normal. No accessory muscle usage or respiratory distress.   Musculoskeletal:      Right lower " leg: No edema.      Left lower leg: No edema.   Skin:     General: Skin is warm and dry.      Coloration: Skin is not ashen, cyanotic or jaundiced.   Neurological:      General: No focal deficit present.      Mental Status: She is alert and oriented to person, place, and time.   Psychiatric:         Mood and Affect: Mood normal.         Behavior: Behavior is cooperative.       Result Review    HPV DNA Probe, Direct - ThinPrep Vial, Cervix, Endocervix (12/31/2024 09:56)  Liquid-based Pap Smear, Screening (12/31/2024 09:56)  Lab Results   Component Value Date    INTERPGYN Negative for intraepithelial lesion or malignancy 12/31/2024    HPVAPTIMA Not Detected 12/31/2024           Tissue Pathology Exam (12/31/2024 09:56)  Tissue Pathology Exam (12/31/2024 09:56)  1.  Endometrium, curettage:  Atrophic endometrium.  Benign endometrial polyp.  Complex hyperplasia or atypia are not present.     2.  Cervical polyp, biopsy:  Benign endometrial polyp.  Complex hyperplasia or atypia are not present.    NuSwab VG+ - Swab, Vagina (12/31/2024 09:46) negative    US Non-ob Transvaginal (12/27/2024 10:43)   1.  Uterus: Small/atrophic, with uterine dimensions 5.6 x 4.5 x 3.1 cm, and Anteverted     2.  Endometrium:  3.7 mm     3.  Myometrium: Normal homogenous texture     4.  Ovaries  Left:    Normal/unremarkable, measures 2.5 x 1.7 x 1.3 cm  Right:  Not visualized        Assessment & Plan   Assessment / Plan     Diagnoses and all orders for this visit:    1. PMB (postmenopausal bleeding) (Primary)    2. Cervical polyp    3. History of breast cancer    4. Use of letrozole (Femara)        Discussion:   Biopsy results reviewed with patient and spouse. Results reassuring. Recommended expectant management with repeat US for monitoring in 6 months. Advised if with bleeding/pelvic discomfort/etc. To call us for an appointment for evaluation. Questions answered. She expressed understanding and is agreeable to this.     20 minutes was spent  with this patient, at least 70% of that time in direct face-to-face counseling about her condition/treatment options.      Follow-up: Return in about 6 months (around 7/7/2025) for GYN US, GYN Follow-up.    Panchito Marlow MD

## 2025-01-08 ENCOUNTER — OFFICE VISIT (OUTPATIENT)
Age: 70
End: 2025-01-08
Payer: MEDICARE

## 2025-01-08 VITALS
WEIGHT: 174 LBS | HEIGHT: 68 IN | OXYGEN SATURATION: 99 % | BODY MASS INDEX: 26.37 KG/M2 | RESPIRATION RATE: 18 BRPM | DIASTOLIC BLOOD PRESSURE: 72 MMHG | HEART RATE: 90 BPM | SYSTOLIC BLOOD PRESSURE: 166 MMHG

## 2025-01-08 DIAGNOSIS — Z92.3 HISTORY OF RADIATION THERAPY: ICD-10-CM

## 2025-01-08 DIAGNOSIS — C50.911 MALIGNANT NEOPLASM OF RIGHT FEMALE BREAST, UNSPECIFIED ESTROGEN RECEPTOR STATUS, UNSPECIFIED SITE OF BREAST: Primary | ICD-10-CM

## 2025-01-08 DIAGNOSIS — Z79.811 PROPHYLACTIC USE OF LETROZOLE: ICD-10-CM

## 2025-01-08 DIAGNOSIS — Z98.890 S/P LYMPH NODE BIOPSY: ICD-10-CM

## 2025-01-08 DIAGNOSIS — Z90.13 S/P BILATERAL MASTECTOMY: ICD-10-CM

## 2025-01-08 DIAGNOSIS — Z78.9 NON-SMOKER: ICD-10-CM

## 2025-01-08 PROCEDURE — G0463 HOSPITAL OUTPT CLINIC VISIT: HCPCS | Performed by: RADIOLOGY

## 2025-02-05 ENCOUNTER — OFFICE VISIT (OUTPATIENT)
Dept: OTOLARYNGOLOGY | Facility: CLINIC | Age: 70
End: 2025-02-05
Payer: MEDICARE

## 2025-02-05 ENCOUNTER — LAB (OUTPATIENT)
Dept: LAB | Facility: HOSPITAL | Age: 70
End: 2025-02-05
Payer: MEDICARE

## 2025-02-05 VITALS
BODY MASS INDEX: 26.37 KG/M2 | HEIGHT: 68 IN | HEART RATE: 79 BPM | TEMPERATURE: 98.2 F | DIASTOLIC BLOOD PRESSURE: 72 MMHG | SYSTOLIC BLOOD PRESSURE: 150 MMHG | WEIGHT: 174 LBS

## 2025-02-05 DIAGNOSIS — E04.1 THYROID NODULE: ICD-10-CM

## 2025-02-05 DIAGNOSIS — C50.211 MALIGNANT NEOPLASM OF UPPER-INNER QUADRANT OF RIGHT BREAST IN FEMALE, ESTROGEN RECEPTOR POSITIVE (HCC): ICD-10-CM

## 2025-02-05 DIAGNOSIS — Z17.0 MALIGNANT NEOPLASM OF UPPER-INNER QUADRANT OF RIGHT BREAST IN FEMALE, ESTROGEN RECEPTOR POSITIVE (HCC): ICD-10-CM

## 2025-02-05 DIAGNOSIS — E04.1 THYROID NODULE: Primary | ICD-10-CM

## 2025-02-05 LAB
T4 FREE SERPL-MCNC: 0.99 NG/DL (ref 0.93–1.7)
TSH SERPL DL<=0.05 MIU/L-ACNC: 1.42 UIU/ML (ref 0.27–4.2)

## 2025-02-05 PROCEDURE — 36415 COLL VENOUS BLD VENIPUNCTURE: CPT

## 2025-02-05 PROCEDURE — 84443 ASSAY THYROID STIM HORMONE: CPT

## 2025-02-05 PROCEDURE — 84480 ASSAY TRIIODOTHYRONINE (T3): CPT

## 2025-02-05 PROCEDURE — 84439 ASSAY OF FREE THYROXINE: CPT

## 2025-02-05 RX ORDER — LETROZOLE 2.5 MG/1
2.5 TABLET, FILM COATED ORAL DAILY
Qty: 30 TABLET | Refills: 3 | Status: SHIPPED | OUTPATIENT
Start: 2025-02-05

## 2025-02-05 NOTE — PROGRESS NOTES
YOB: 1955  Location: [facility]  Location Address: [address] [place] [address], [facility], [address] [place] [address]  Location Phone: [phone]    Chief Complaint   Patient presents with    Thyroid Problem       History of Present Illness  Karen Tipton is a 70 y.o. female.  Karen Tipton is here for follow up of ENT complaints. The patient has had problems with thyroid nodules.  Nodules were found incidentally on CT chest during workup for breast cancer patient denies sore throat hoarseness or dysphagia dominant nodule underwent fine-needle aspiration in 2023 with benign pathology     TSH (2025 15:20)   US Thyroid (2025 13:56)      Past Medical History:   Diagnosis Date    Breast cancer     Colon polyp     Hypertension        Past Surgical History:   Procedure Laterality Date    BREAST BIOPSY      COLONOSCOPY  06/10/2014    3 polyps destroyed    COLONOSCOPY N/A 2019    Procedure: COLONOSCOPY WITH ANESTHESIA;  Surgeon: Ismael Ramachandran MD;  Location: [facility] ENDOSCOPY;  Service: Gastroenterology    MASTECTOMY Bilateral        Outpatient Medications Marked as Taking for the 25 encounter (Office Visit) with Raegan Cheema APRN   Medication Sig Dispense Refill    letrozole (FEMARA) 2.5 MG tablet Take 1 tablet by mouth Daily.      lisinopril-hydrochlorothiazide (PRINZIDE,ZESTORETIC) 10-12.5 MG per tablet Take 1 tablet by mouth once daily 90 tablet 0    montelukast (SINGULAIR) 10 MG tablet Take 1 tablet by mouth Every Night.         Patient has no known allergies.    Family History   Problem Relation Age of Onset    Cancer Father         protate    Stroke Father         My dad also had a stroke    Stroke Mother     Dementia Mother     Colon cancer Paternal Grandfather     Breast cancer Paternal Grandmother     Cancer Paternal Grandmother         Breast cancer    Ovarian cancer Neg Hx        Social History     Socioeconomic History    Marital status:    Tobacco Use     Smoking status: Never    Smokeless tobacco: Never   Vaping Use    Vaping status: Never Used   Substance and Sexual Activity    Alcohol use: Yes     Comment: Very rarely    Drug use: Never    Sexual activity: Defer       Review of Systems   Constitutional: Negative.    HENT: Negative.         Vitals:    02/05/25 1421   BP: 150/72   Pulse: 79   Temp: 98.2 °F (36.8 °C)       Body mass index is 26.46 kg/m².    Objective     Physical Exam  Vitals reviewed.   Constitutional:       Appearance: Normal appearance. She is normal weight.   HENT:      Head: Normocephalic.      Right Ear: Tympanic membrane, ear canal and external ear normal.      Left Ear: Tympanic membrane, ear canal and external ear normal.      Nose: Nose normal.      Mouth/Throat:      Lips: Pink.      Mouth: Mucous membranes are moist.      Pharynx: Uvula midline.   Neck:      Comments: Bilateral palpable thyroid nodules     Musculoskeletal:      Cervical back: Full passive range of motion without pain.   Neurological:      Mental Status: She is alert.         Assessment & Plan   Diagnoses and all orders for this visit:    1. Thyroid nodule (Primary)  -     US Thyroid; Future  -     TSH; Future  -     T4, Free; Future  -     T3; Future      * Surgery not found *  Orders Placed This Encounter   Procedures    US Thyroid     Standing Status:   Future     Standing Expiration Date:   2/5/2026     Order Specific Question:   Reason for Exam:     Answer:   nodules     Order Specific Question:   Release to patient     Answer:   Routine Release [[id number]]    TSH     Standing Status:   Future     Number of Occurrences:   1     Standing Expiration Date:   2/5/2026     Order Specific Question:   Release to patient     Answer:   Routine Release [[id number]]    T4, Free     Standing Status:   Future     Number of Occurrences:   1     Standing Expiration Date:   2/5/2026     Order Specific Question:   Release to patient     Answer:   Routine Release [[id number]]    T3      Standing Status:   Future     Number of Occurrences:   1     Standing Expiration Date:   2/5/2026     Order Specific Question:   Release to patient     Answer:   Routine Release [[id number]]     Return in about 6 months (around 8/5/2025).       Patient Instructions   The patient has a thyroid nodule, which is relatively small, and studies do not suggest a malignancy. I have recommended observation with follow-up with me for repeat ultrasound. I explained the pathology of thyroid nodules including the risks of cancer. The options of surgery were discussed, but the patient wants to pursue an observational course for now, which is reasonable. The patient wishes to continue to defer surgery at this time.

## 2025-02-06 LAB — T3 SERPL-MCNC: 122 NG/DL (ref 80–200)

## 2025-02-17 ENCOUNTER — TELEPHONE (OUTPATIENT)
Dept: HEMATOLOGY | Age: 70
End: 2025-02-17

## 2025-02-17 NOTE — TELEPHONE ENCOUNTER
Called pt. to remind them of appointment on 02/21/2025 and had to leave a detailed voicemail with appointment date and time. Reminded patient to just come at appointment time, and to not come at the lab appointment time. Reminded patient that we will not check them in any more than 30 minutes before appointment time. We have now moved to the [facility] that is located between our old office and the ER at the [facility]. Letting the Pt know that our front entrance faces the  [employer] fields and leaving our address. Reminded pt to eat well and be well hydrated for their labs. . Let patient know that if we are closed due to inclement weather we will call them the night before or the day of. We will be calling from a blocked number so if they do not answer we will leave a voicemail if that is an option.

## 2025-02-20 DIAGNOSIS — C50.211 MALIGNANT NEOPLASM OF UPPER-INNER QUADRANT OF RIGHT BREAST IN FEMALE, ESTROGEN RECEPTOR POSITIVE (HCC): Primary | ICD-10-CM

## 2025-02-20 DIAGNOSIS — Z17.0 MALIGNANT NEOPLASM OF UPPER-INNER QUADRANT OF RIGHT BREAST IN FEMALE, ESTROGEN RECEPTOR POSITIVE (HCC): Primary | ICD-10-CM

## 2025-02-20 NOTE — PROGRESS NOTES
Progress Note      Pt Name: Katie Cardenas  YOB: 1955  MRN: [id number]    Date of evaluation: 02/21/2025  History Obtained From:  patient, electronic medical record    CHIEF COMPLAINT:    No chief complaint on file.    HISTORY OF PRESENT ILLNESS:    Katie Cardenas is a 70 y.o.  female who is currently being followed for invasive ductal carcinoma of the right breast with 2 separate lesions, stage IIIa (1:00 lesion ER 99%, WY 0, HER2 2+/equivocal, FISH negative, Ki-67 55% grade 3 and 4:00 lesion 96% ER, %, HER2 2+/equivocal, FISH negative, Ki-67 15%, grade 2).  She is status post neoadjuvant chemotherapy, bilateral mastectomy, adjuvant radiation therapy and current recommendation is adjuvant aromatase inhibitor therapy with letrozole 2.5 mg p.o. daily for 5 years, through June 2028.  She has had no evidence to suggest recurrence of disease.  Katie returns today in scheduled follow-up for evaluation, lab monitoring and further treatment recommendations.    Today's clinic visit to include physical assessment, review of systems, any lab or radiographic findings that were available and plan of care are documented below.    ONCOLOGIC HISTORY:     Diagnosis  Invasive ductal carcinoma, 1:00 right breast, Dec 2022  Grade 3  ER 99%, WY 0, HER-2 2+/equivocal, FISH-negative, Ki67 55%  MammaPrint: Luminal B/high risk.  Invasive ductal carcinoma, 4:00 right breast, Dec 2022  ER 96%, %, HER-2 2+/equivocal, FISH-negative, Ki67 15%  MammaPrint: Luminal B/high risk.  Grade 2  Clinical T3 N1 M0, grade 3, stage IIIa->ypT1N0(sn)  Germline Mina 81 gene genetic panel: Negative for pathogenic mutations; VUS BRIP1     Treatment Summary  2/15/23-6/9/23 Initiated dose dense Adriamycin Cyclophosphamide + GCSF every 2 weeks x4 cycles followed by weekly Taxol x 9 weekly cycles. Chemotherapy stopped due to development of neuropathy. We will omit the 3 remaining weekly cycles of Taxol.  8/15/23 Bilateral    Psychiatric/Behavioral: Negative.  The patient is not nervous/anxious.        Objective   PHYSICAL EXAM:  Physical Exam  Vitals reviewed.   Constitutional:       General: She is not in acute distress.     Appearance: She is well-developed.   HENT:      Head: Normocephalic and atraumatic.      Mouth/Throat:      Pharynx: Uvula midline.      Tonsils: No tonsillar exudate.   Eyes:      General: Lids are normal.      Conjunctiva/sclera: Conjunctivae normal.      Pupils: Pupils are equal, round, and reactive to light.   Neck:      Thyroid: No thyroid mass or thyromegaly.      Vascular: No JVD.      Trachea: Trachea normal. No tracheal deviation.   Cardiovascular:      Rate and Rhythm: Normal rate and regular rhythm.      Pulses: Normal pulses.      Heart sounds: Normal heart sounds.   Pulmonary:      Effort: Pulmonary effort is normal. No respiratory distress.      Breath sounds: Normal breath sounds. No wheezing or rales.   Chest:      Chest wall: No tenderness.       Abdominal:      General: Bowel sounds are normal. There is no distension.      Palpations: Abdomen is soft. There is no mass.      Tenderness: There is no abdominal tenderness. There is no guarding.   Musculoskeletal:         General: No tenderness or deformity.      Cervical back: Normal range of motion and neck supple.      Comments: Range of motion within normal limits x4 extremities   Skin:     General: Skin is warm.      Findings: No bruising, erythema or rash.   Neurological:      Mental Status: She is alert and oriented to person, place, and time.      Cranial Nerves: No cranial nerve deficit.      Coordination: Coordination normal.   Psychiatric:         Behavior: Behavior normal.         Thought Content: Thought content normal.         Labs reviewed today:  Lab Results   Component Value Date    WBC 5.32 02/21/2025    HGB 12.9 02/21/2025    HCT 40.9 02/21/2025    MCV 76.4 (L) 02/21/2025     02/21/2025     Lab Results   Component Value Date

## 2025-02-21 ENCOUNTER — OFFICE VISIT (OUTPATIENT)
Dept: HEMATOLOGY | Age: 70
End: 2025-02-21
Payer: MEDICARE

## 2025-02-21 ENCOUNTER — HOSPITAL ENCOUNTER (OUTPATIENT)
Dept: INFUSION THERAPY | Age: 70
Discharge: HOME | End: 2025-02-21
Payer: MEDICARE

## 2025-02-21 VITALS
HEART RATE: 78 BPM | OXYGEN SATURATION: 98 % | BODY MASS INDEX: 27.07 KG/M2 | SYSTOLIC BLOOD PRESSURE: 122 MMHG | WEIGHT: 178.6 LBS | HEIGHT: 68 IN | TEMPERATURE: 97.7 F | DIASTOLIC BLOOD PRESSURE: 76 MMHG

## 2025-02-21 DIAGNOSIS — C50.211 MALIGNANT NEOPLASM OF UPPER-INNER QUADRANT OF RIGHT BREAST IN FEMALE, ESTROGEN RECEPTOR POSITIVE (HCC): ICD-10-CM

## 2025-02-21 DIAGNOSIS — Z51.81 ENCOUNTER FOR MONITORING AROMATASE INHIBITOR THERAPY: Primary | ICD-10-CM

## 2025-02-21 DIAGNOSIS — Z17.0 MALIGNANT NEOPLASM OF UPPER-INNER QUADRANT OF RIGHT BREAST IN FEMALE, ESTROGEN RECEPTOR POSITIVE (HCC): ICD-10-CM

## 2025-02-21 DIAGNOSIS — Z79.811 ENCOUNTER FOR MONITORING AROMATASE INHIBITOR THERAPY: Primary | ICD-10-CM

## 2025-02-21 DIAGNOSIS — Z91.89 AT RISK FOR BONE DENSITY LOSS: ICD-10-CM

## 2025-02-21 LAB
ALBUMIN SERPL-MCNC: 4.1 G/DL (ref 3.5–5.2)
ALP SERPL-CCNC: 106 U/L (ref 35–104)
ALT SERPL-CCNC: 17 U/L (ref 5–33)
ANION GAP SERPL CALCULATED.3IONS-SCNC: 10 MMOL/L (ref 7–19)
AST SERPL-CCNC: 20 U/L (ref 5–32)
BASOPHILS # BLD: 0.05 K/UL (ref 0–0.2)
BASOPHILS NFR BLD: 0.9 % (ref 0–1)
BILIRUB SERPL-MCNC: 0.4 MG/DL (ref 0–1.2)
BUN SERPL-MCNC: 12 MG/DL (ref 8–23)
CALCIUM SERPL-MCNC: 9.6 MG/DL (ref 8.8–10.2)
CHLORIDE SERPL-SCNC: 103 MMOL/L (ref 98–107)
CO2 SERPL-SCNC: 29 MMOL/L (ref 22–29)
CREAT SERPL-MCNC: 0.6 MG/DL (ref 0.5–0.9)
EOSINOPHIL # BLD: 0.3 K/UL (ref 0–0.6)
EOSINOPHIL NFR BLD: 5.6 % (ref 0–5)
ERYTHROCYTE [DISTWIDTH] IN BLOOD BY AUTOMATED COUNT: 14.3 % (ref 11.5–14.5)
GLUCOSE SERPL-MCNC: 121 MG/DL (ref 70–99)
HCT VFR BLD AUTO: 40.9 % (ref 37–47)
HGB BLD-MCNC: 12.9 G/DL (ref 12–16)
LYMPHOCYTES # BLD: 1.4 K/UL (ref 1.1–4.5)
LYMPHOCYTES NFR BLD: 26.3 % (ref 20–40)
MCH RBC QN AUTO: 24.1 PG (ref 27–31)
MCHC RBC AUTO-ENTMCNC: 31.5 G/DL (ref 33–37)
MCV RBC AUTO: 76.4 FL (ref 81–99)
MONOCYTES # BLD: 0.34 K/UL (ref 0–0.9)
MONOCYTES NFR BLD: 6.4 % (ref 1–10)
NEUTROPHILS # BLD: 3.21 K/UL (ref 1.5–7.5)
NEUTS SEG NFR BLD: 60.4 % (ref 50–65)
PLATELET # BLD AUTO: 226 K/UL (ref 130–400)
PMV BLD AUTO: 9 FL (ref 9.4–12.3)
POTASSIUM SERPL-SCNC: 4.3 MMOL/L (ref 3.5–5.1)
PROT SERPL-MCNC: 6.8 G/DL (ref 6.4–8.3)
RBC # BLD AUTO: 5.35 M/UL (ref 4.2–5.4)
SODIUM SERPL-SCNC: 142 MMOL/L (ref 136–145)
WBC # BLD AUTO: 5.32 K/UL (ref 4.8–10.8)

## 2025-02-21 PROCEDURE — 85025 COMPLETE CBC W/AUTO DIFF WBC: CPT

## 2025-02-21 PROCEDURE — 99214 OFFICE O/P EST MOD 30 MIN: CPT | Performed by: NURSE PRACTITIONER

## 2025-02-21 PROCEDURE — 1123F ACP DISCUSS/DSCN MKR DOCD: CPT | Performed by: NURSE PRACTITIONER

## 2025-02-21 PROCEDURE — 1159F MED LIST DOCD IN RCRD: CPT | Performed by: NURSE PRACTITIONER

## 2025-02-21 PROCEDURE — 36415 COLL VENOUS BLD VENIPUNCTURE: CPT

## 2025-02-21 PROCEDURE — 1126F AMNT PAIN NOTED NONE PRSNT: CPT | Performed by: NURSE PRACTITIONER

## 2025-02-21 PROCEDURE — 99212 OFFICE O/P EST SF 10 MIN: CPT

## 2025-02-21 PROCEDURE — 80053 COMPREHEN METABOLIC PANEL: CPT

## 2025-02-21 RX ORDER — LETROZOLE 2.5 MG/1
2.5 TABLET, FILM COATED ORAL DAILY
Qty: 90 TABLET | Refills: 2 | Status: SHIPPED | OUTPATIENT
Start: 2025-02-21 | End: 2025-11-18

## 2025-02-24 ASSESSMENT — ENCOUNTER SYMPTOMS
WHEEZING: 0
EYE REDNESS: 0
EYE DISCHARGE: 0
BLOOD IN STOOL: 0
COUGH: 0
EYES NEGATIVE: 1
SORE THROAT: 0
GASTROINTESTINAL NEGATIVE: 1
NAUSEA: 0
VOMITING: 0
SHORTNESS OF BREATH: 0
CONSTIPATION: 0
RESPIRATORY NEGATIVE: 1
DIARRHEA: 0
ABDOMINAL PAIN: 0
EYE PAIN: 0
BACK PAIN: 0

## 2025-06-17 ENCOUNTER — OFFICE VISIT (OUTPATIENT)
Dept: SURGERY | Age: 70
End: 2025-06-17
Payer: MEDICARE

## 2025-06-17 VITALS — HEART RATE: 80 BPM | WEIGHT: 173 LBS | BODY MASS INDEX: 26.22 KG/M2 | HEIGHT: 68 IN | OXYGEN SATURATION: 98 %

## 2025-06-17 DIAGNOSIS — Z90.13 STATUS POST BILATERAL MASTECTOMY: Primary | ICD-10-CM

## 2025-06-17 DIAGNOSIS — Z85.3 HISTORY OF BREAST CANCER: ICD-10-CM

## 2025-06-17 PROCEDURE — 99213 OFFICE O/P EST LOW 20 MIN: CPT | Performed by: PHYSICIAN ASSISTANT

## 2025-06-17 PROCEDURE — 1123F ACP DISCUSS/DSCN MKR DOCD: CPT | Performed by: PHYSICIAN ASSISTANT

## 2025-06-17 PROCEDURE — 1159F MED LIST DOCD IN RCRD: CPT | Performed by: PHYSICIAN ASSISTANT

## 2025-06-17 RX ORDER — LISINOPRIL 10 MG/1
10 TABLET ORAL DAILY
COMMUNITY
Start: 2025-04-01

## 2025-06-17 NOTE — PROGRESS NOTES
HISTORY OF PRESENT ILLNESS:  Ms. Cardenas presents today for exam following bilateral simple mastectomy with right sentinel lymph node biopsy on 8/15/2023.    She has finished radiation therapy     She is originally status post ultrasound guided breast biopsy on the right x's 2 areas. At 1:00 revealed a 1.6  cm high grade invasive ductal carcinoma. At 4:00 revealed a 1 cm intermediate grade invasive ductal mammary carcinoma. (1:00) ER positive at 99%. MA negative. Her2 positive. Ki67 55%.  (4:00) ER positive at 96%. MA positive at 100%. Her2 positive. Ki67 14%.  FISH Negative for both.     Her repeat markers showed ER positive at 80%.  MA positive at 3%.  HER2 is equivocal 2+ and Ki-67 is 2%.    Ultrasound-guided FNA of right axillary node was negative for malignancy    Mammaprint is High Risk Luminal B-Type     MRI-7/25/2023  FINDINGS:  Amount of Fibroglandular Tissue: Heterogeneous fibroglandular tissue.  Background Parenchymal Enhancement:  Mild.  Right breast: On prior exam there are 3 areas of abnormal kinetics  described in the right breast. The lesion in the upper inner quadrant  of the right breast does not demonstrate abnormal kinetics.  The lesion in the upper outer right breast does not demonstrate  abnormal kinetics.  The lesion in the medial proximal third of the right breast  demonstrates persistent abnormal kinetics. This is in the right inner  quadrant of the left breast 5:00 position 1.7 x 0.9 x 1.6 cm. Curve  peak is 80%. This is a biopsy-proven malignancy.  Left breast: Heterogeneous fibroglandular tissue is present in the  left breast with no focal regions of architectural distortion or  abnormal kinetics.  Nodes: A suspicious node in the right axilla is again noted.  Appearance is concerning for willy disease and similar to prior exam  of January 12, 2023  Partially visualized chest/abdomen: Grossly unremarkable  Bones: No suspicious bony findings  IMPRESSION:  1. Persistent area of abnormal

## 2025-07-08 ENCOUNTER — OFFICE VISIT (OUTPATIENT)
Dept: OBSTETRICS AND GYNECOLOGY | Age: 70
End: 2025-07-08
Payer: MEDICARE

## 2025-07-08 VITALS
HEIGHT: 68 IN | BODY MASS INDEX: 26.19 KG/M2 | WEIGHT: 172.8 LBS | SYSTOLIC BLOOD PRESSURE: 132 MMHG | DIASTOLIC BLOOD PRESSURE: 70 MMHG

## 2025-07-08 DIAGNOSIS — N95.0 PMB (POSTMENOPAUSAL BLEEDING): Primary | ICD-10-CM

## 2025-07-08 DIAGNOSIS — Z85.3 HISTORY OF BREAST CANCER: ICD-10-CM

## 2025-07-08 DIAGNOSIS — Z79.811 USE OF LETROZOLE (FEMARA): ICD-10-CM

## 2025-07-08 DIAGNOSIS — N84.1 CERVICAL POLYP: ICD-10-CM

## 2025-07-08 PROCEDURE — 3075F SYST BP GE 130 - 139MM HG: CPT | Performed by: OBSTETRICS & GYNECOLOGY

## 2025-07-08 PROCEDURE — 3078F DIAST BP <80 MM HG: CPT | Performed by: OBSTETRICS & GYNECOLOGY

## 2025-07-08 PROCEDURE — 99213 OFFICE O/P EST LOW 20 MIN: CPT | Performed by: OBSTETRICS & GYNECOLOGY

## 2025-07-08 RX ORDER — MULTIVIT AND MINERALS-FERROUS GLUCONATE 9 MG IRON/15 ML ORAL LIQUID 9 MG/15 ML
LIQUID (ML) ORAL
COMMUNITY

## 2025-07-08 NOTE — PROGRESS NOTES
"    Panchito Marlow MD  [facility]  [address]  [place] [address]  Office [phone]  Fax [phone]      [facility]  Karen Tipton  : 1955  MRN: [id number]    Subjective   Subjective     Chief Complaint   Patient presents with    postmenopausal bleeding     Pt is here to follow up on PMB, us done today, pt hasn't bled since the polyp was removed, no concerns today        History of Present Illness  Karen Tipton is a 70 y.o. female , , who comes to the office today for follow-up. Polyp and endometrial biopsy in 2024 for postmenopausal bleeding. No further bleeding since. No GYN complaints or concerns.     Objective    Objective     Vitals:   Visit Vitals  /70 (BP Location: Right arm, Patient Position: Sitting, Cuff Size: Adult)   Ht 172.7 cm (67.99\")   Wt 78.4 kg (172 lb 12.8 oz)   Breastfeeding No   BMI 26.28 kg/m²        Physical Exam  Vitals reviewed.   Constitutional:       General: She is not in acute distress.     Appearance: Normal appearance. She is not ill-appearing.   HENT:      Head: Normocephalic and atraumatic.      Nose: No congestion or rhinorrhea.   Eyes:      General: No scleral icterus.        Right eye: No discharge.         Left eye: No discharge.      Extraocular Movements: Extraocular movements intact.      Conjunctiva/sclera: Conjunctivae normal.   Pulmonary:      Effort: Pulmonary effort is normal. No accessory muscle usage or respiratory distress.   Musculoskeletal:      Right lower leg: No edema.      Left lower leg: No edema.   Skin:     General: Skin is warm and dry.      Coloration: Skin is not ashen, cyanotic or jaundiced.   Neurological:      General: No focal deficit present.      Mental Status: She is alert and oriented to person, place, and time.   Psychiatric:         Mood and Affect: Mood normal.         Behavior: Behavior is cooperative.       Result Review    Tissue Pathology Exam (2024 09:56)   1.  Endometrium, " curettage:  Atrophic endometrium.  Benign endometrial polyp.  Complex hyperplasia or atypia are not present.     2.  Cervical polyp, biopsy:  Benign endometrial polyp.  Complex hyperplasia or atypia are not present.    US Non-ob Transvaginal (07/08/2025 09:02)   1.  Uterus: Small/atrophic, with uterine dimensions 5.3 x 2.5 x 4.4 cm, and Anteverted     2.  Endometrium: 4.4 mm     3.  Myometrium: Normal homogenous texture     4.  Ovaries  Left:    Normal/unremarkable ovary measures 2.3 x 1.9 x 1.3 cm  Right:  Normal/unremarkable ovary measures 2.1 x 1.6 x 1.5 cm        Assessment & Plan   Assessment / Plan     Diagnoses and all orders for this visit:    1. PMB (postmenopausal bleeding) (Primary)    2. Cervical polyp    3. History of breast cancer    4. Use of letrozole (Femara)        Discussion: Prior benign EMB/cervical polyp. No further bleeding. Ultrasound reassuring. Recommend rechecking yearly for stability or sooner if with symptoms. Symptoms to report/contact us reviewed. Questions answered. She expressed understanding and is in agreement to plan of care.     Follow-up: Return in about 1 year (around 7/8/2026), or if symptoms worsen or fail to improve, for GYN US, annual.    Panchito Marlow MD

## 2025-08-18 ENCOUNTER — TELEPHONE (OUTPATIENT)
Dept: HEMATOLOGY | Age: 70
End: 2025-08-18

## 2025-08-20 DIAGNOSIS — Z17.0 MALIGNANT NEOPLASM OF UPPER-INNER QUADRANT OF RIGHT BREAST IN FEMALE, ESTROGEN RECEPTOR POSITIVE (HCC): Primary | ICD-10-CM

## 2025-08-20 DIAGNOSIS — C50.211 MALIGNANT NEOPLASM OF UPPER-INNER QUADRANT OF RIGHT BREAST IN FEMALE, ESTROGEN RECEPTOR POSITIVE (HCC): Primary | ICD-10-CM

## 2025-08-21 ENCOUNTER — HOSPITAL ENCOUNTER (OUTPATIENT)
Dept: INFUSION THERAPY | Age: 70
Discharge: HOME | End: 2025-08-21
Payer: MEDICARE

## 2025-08-21 ENCOUNTER — OFFICE VISIT (OUTPATIENT)
Dept: HEMATOLOGY | Age: 70
End: 2025-08-21
Payer: MEDICARE

## 2025-08-21 VITALS
OXYGEN SATURATION: 97 % | HEART RATE: 67 BPM | DIASTOLIC BLOOD PRESSURE: 80 MMHG | WEIGHT: 173.5 LBS | BODY MASS INDEX: 26.3 KG/M2 | TEMPERATURE: 98.5 F | HEIGHT: 68 IN | SYSTOLIC BLOOD PRESSURE: 150 MMHG

## 2025-08-21 DIAGNOSIS — Z78.0 POST-MENOPAUSAL: ICD-10-CM

## 2025-08-21 DIAGNOSIS — Z17.0 MALIGNANT NEOPLASM OF UPPER-INNER QUADRANT OF RIGHT BREAST IN FEMALE, ESTROGEN RECEPTOR POSITIVE (HCC): Primary | ICD-10-CM

## 2025-08-21 DIAGNOSIS — Z79.811 ENCOUNTER FOR MONITORING AROMATASE INHIBITOR THERAPY: ICD-10-CM

## 2025-08-21 DIAGNOSIS — Z71.89 CARE PLAN DISCUSSED WITH PATIENT: ICD-10-CM

## 2025-08-21 DIAGNOSIS — Z91.89 AT RISK FOR BONE DENSITY LOSS: ICD-10-CM

## 2025-08-21 DIAGNOSIS — C50.211 MALIGNANT NEOPLASM OF UPPER-INNER QUADRANT OF RIGHT BREAST IN FEMALE, ESTROGEN RECEPTOR POSITIVE (HCC): Primary | ICD-10-CM

## 2025-08-21 DIAGNOSIS — Z17.0 MALIGNANT NEOPLASM OF UPPER-INNER QUADRANT OF RIGHT BREAST IN FEMALE, ESTROGEN RECEPTOR POSITIVE (HCC): ICD-10-CM

## 2025-08-21 DIAGNOSIS — C50.211 MALIGNANT NEOPLASM OF UPPER-INNER QUADRANT OF RIGHT BREAST IN FEMALE, ESTROGEN RECEPTOR POSITIVE (HCC): ICD-10-CM

## 2025-08-21 DIAGNOSIS — Z51.81 ENCOUNTER FOR MONITORING AROMATASE INHIBITOR THERAPY: ICD-10-CM

## 2025-08-21 DIAGNOSIS — Z79.811 LONG TERM (CURRENT) USE OF AROMATASE INHIBITORS: ICD-10-CM

## 2025-08-21 LAB
ALBUMIN SERPL-MCNC: 3.9 G/DL (ref 3.5–5.2)
ALP SERPL-CCNC: 91 U/L (ref 35–104)
ALT SERPL-CCNC: 21 U/L (ref 5–33)
ANION GAP SERPL CALCULATED.3IONS-SCNC: 12 MMOL/L (ref 7–19)
AST SERPL-CCNC: 26 U/L (ref 5–32)
BASOPHILS # BLD: 0.06 K/UL (ref 0–0.2)
BASOPHILS NFR BLD: 0.9 % (ref 0–1)
BILIRUB SERPL-MCNC: 0.5 MG/DL (ref 0–1.2)
BUN SERPL-MCNC: 15 MG/DL (ref 8–23)
CALCIUM SERPL-MCNC: 9.9 MG/DL (ref 8.8–10.2)
CHLORIDE SERPL-SCNC: 104 MMOL/L (ref 98–107)
CO2 SERPL-SCNC: 26 MMOL/L (ref 22–29)
CREAT SERPL-MCNC: 0.6 MG/DL (ref 0.5–0.9)
EOSINOPHIL # BLD: 0.58 K/UL (ref 0–0.6)
EOSINOPHIL NFR BLD: 8.8 % (ref 0–5)
ERYTHROCYTE [DISTWIDTH] IN BLOOD BY AUTOMATED COUNT: 14.6 % (ref 11.5–14.5)
GLUCOSE SERPL-MCNC: 97 MG/DL (ref 70–99)
HCT VFR BLD AUTO: 38.6 % (ref 37–47)
HGB BLD-MCNC: 12.4 G/DL (ref 12–16)
LYMPHOCYTES # BLD: 1.73 K/UL (ref 1.1–4.5)
LYMPHOCYTES NFR BLD: 26.3 % (ref 20–40)
MCH RBC QN AUTO: 24.9 PG (ref 27–31)
MCHC RBC AUTO-ENTMCNC: 32.1 G/DL (ref 33–37)
MCV RBC AUTO: 77.5 FL (ref 81–99)
MONOCYTES # BLD: 0.45 K/UL (ref 0–0.9)
MONOCYTES NFR BLD: 6.8 % (ref 1–10)
NEUTROPHILS # BLD: 3.74 K/UL (ref 1.5–7.5)
NEUTS SEG NFR BLD: 56.7 % (ref 50–65)
PLATELET # BLD AUTO: 226 K/UL (ref 130–400)
PMV BLD AUTO: 9.8 FL (ref 9.4–12.3)
POTASSIUM SERPL-SCNC: 4.9 MMOL/L (ref 3.5–5.1)
PROT SERPL-MCNC: 6.5 G/DL (ref 6.4–8.3)
RBC # BLD AUTO: 4.98 M/UL (ref 4.2–5.4)
SODIUM SERPL-SCNC: 142 MMOL/L (ref 136–145)
WBC # BLD AUTO: 6.59 K/UL (ref 4.8–10.8)

## 2025-08-21 PROCEDURE — 36415 COLL VENOUS BLD VENIPUNCTURE: CPT

## 2025-08-21 PROCEDURE — 99214 OFFICE O/P EST MOD 30 MIN: CPT | Performed by: NURSE PRACTITIONER

## 2025-08-21 PROCEDURE — G2211 COMPLEX E/M VISIT ADD ON: HCPCS | Performed by: NURSE PRACTITIONER

## 2025-08-21 PROCEDURE — 1123F ACP DISCUSS/DSCN MKR DOCD: CPT | Performed by: NURSE PRACTITIONER

## 2025-08-21 PROCEDURE — 80053 COMPREHEN METABOLIC PANEL: CPT

## 2025-08-21 PROCEDURE — 85025 COMPLETE CBC W/AUTO DIFF WBC: CPT

## 2025-08-21 PROCEDURE — 1126F AMNT PAIN NOTED NONE PRSNT: CPT | Performed by: NURSE PRACTITIONER

## 2025-08-21 PROCEDURE — 99212 OFFICE O/P EST SF 10 MIN: CPT

## 2025-08-21 PROCEDURE — 1159F MED LIST DOCD IN RCRD: CPT | Performed by: NURSE PRACTITIONER

## (undated) DEVICE — MINOR CDS: Brand: MEDLINE INDUSTRIES, INC.

## (undated) DEVICE — GOWN,PREVENTION PLUS,2XL,ST,22/CS: Brand: MEDLINE

## (undated) DEVICE — SUTURE VCRL SZ 3-0 L27IN ABSRB UD L26MM SH 1/2 CIR J416H

## (undated) DEVICE — SNAR POLYP SENSATION MICRO OVL 13 240X40

## (undated) DEVICE — PLASMABLADE X PS210-030S-LIGHT 3.0SL: Brand: PLASMABLADE™ X

## (undated) DEVICE — GOWN,PREVENTION PLUS,XL,ST,24/CS: Brand: MEDLINE

## (undated) DEVICE — KIT STD TEARWY INTRO PTFE 10 FR X 14 CM L PEELPRO

## (undated) DEVICE — GLOVE SURG SZ 75 CRM LTX FREE POLYISOPRENE POLYMER BEAD ANTI

## (undated) DEVICE — ENDOGATOR AUXILIARY WATER JET CONNECTOR: Brand: ENDOGATOR

## (undated) DEVICE — PACK,UNIVERSAL,NO GOWNS: Brand: MEDLINE

## (undated) DEVICE — ADHESIVE SKIN CLOSURE WND 8.661X1.5 IN 22 CM LIQUIBAND SECUR

## (undated) DEVICE — ADHESIVE SKIN CLSR 0.7ML TOP DERMBND ADV

## (undated) DEVICE — Device: Brand: DEFENDO AIR/WATER/SUCTION AND BIOPSY VALVE

## (undated) DEVICE — SEALER TISS L20CM DIA13MM ADV BPLR L CRV JAW OPN APPRCH

## (undated) DEVICE — MASK,OXYGEN,MED CONC,ADLT,7' TUB, UC: Brand: PENDING

## (undated) DEVICE — YANKAUER,BULB TIP WITH VENT: Brand: ARGYLE

## (undated) DEVICE — THE CHANNEL CLEANING BRUSH IS A NYLON FLEXI BRUSH ATTACHED TO A FLEXIBLE PLASTIC SHEATH DESIGNED TO SAFELY REMOVE DEBRIS FROM FLEXIBLE ENDOSCOPES.

## (undated) DEVICE — SUTURE PERMAHAND SZ 2-0 L18IN NONABSORBABLE BLK L26MM FS 685G

## (undated) DEVICE — JACKSON-PRATT 100CC BULB RESERVOIR: Brand: CARDINAL HEALTH

## (undated) DEVICE — SUTURE STRATAFIX SPRL MCRYL + SZ 4-0 L12IN ABSRB UD PS-2 SXMP1B117

## (undated) DEVICE — LIQUIBAND RAPID ADHESIVE 36/CS 0.8ML: Brand: MEDLINE

## (undated) DEVICE — MAJOR CDS

## (undated) DEVICE — DRAIN JACKSON PRATT ROUND 15FR: Brand: CARDINAL HEALTH

## (undated) DEVICE — 6 ML SYRINGE LUER-LOCK TIP: Brand: MONOJECT

## (undated) DEVICE — C-ARM: Brand: UNBRANDED

## (undated) DEVICE — SUTURE PDS II SZ 2-0 L18IN ABSRB VLT SH L26MM 1/2 CIR TAPR Z775D

## (undated) DEVICE — BANDAGE,GAUZE,4.5"X4.1YD,STERILE,LF: Brand: MEDLINE

## (undated) DEVICE — DECANTER FLD 9IN ST BG FOR ASEP TRNSF OF FLD

## (undated) DEVICE — SUTURE MNCRYL STRATAFIX PS 4-0 30CM

## (undated) DEVICE — SUTURE PERMAHAND SZ 2-0 L18IN NONABSORBABLE BLK L26MM SH C012D

## (undated) DEVICE — SUTURE VCRL SZ 3-0 L36IN ABSRB UD L36MM CT-1 1/2 CIR J944H

## (undated) DEVICE — GOWN, ORBIS, XXLARGE, STERILE: Brand: MEDLINE

## (undated) DEVICE — BLADE SURG NO11 C STL RETRCT DISPOSABLE

## (undated) DEVICE — 3M™ IOBAN™ 2 ANTIMICROBIAL INCISE DRAPE 6650EZ: Brand: IOBAN™ 2

## (undated) DEVICE — SUTURE ETHLN SZ 2-0 L30IN NONABSORBABLE BLK L36MM FSLX 3/8 1674H

## (undated) DEVICE — SUTURE VCRL SZ 2-0 L36IN ABSRB UD L36MM CT-1 1/2 CIR J945H

## (undated) DEVICE — CUFF,BP,DISP,1 TUBE,ADULT,HP: Brand: MEDLINE

## (undated) DEVICE — SOLUTION IV IRRIG POUR BRL 0.9% SODIUM CHL 2F7124

## (undated) DEVICE — SYRINGE MED 10ML TRNSLUC BRL PLUNG BLK MRK POLYPR CTRL

## (undated) DEVICE — SENSR O2 OXIMAX FNGR A/ 18IN NONSTR

## (undated) DEVICE — SYRINGE, LUER LOCK, 10ML: Brand: MEDLINE

## (undated) DEVICE — SYRINGE MED 10ML POLYPR LUERSLIP TIP FLAT TOP W/O SFTY DISP

## (undated) DEVICE — TBG SMPL FLTR LINE NASL 02/C02 A/ BX/100